# Patient Record
Sex: MALE | Race: WHITE | NOT HISPANIC OR LATINO | Employment: OTHER | URBAN - METROPOLITAN AREA
[De-identification: names, ages, dates, MRNs, and addresses within clinical notes are randomized per-mention and may not be internally consistent; named-entity substitution may affect disease eponyms.]

---

## 2020-07-10 DIAGNOSIS — R06.00 DYSPNEA, UNSPECIFIED TYPE: Primary | ICD-10-CM

## 2020-07-20 ENCOUNTER — HOSPITAL ENCOUNTER (OUTPATIENT)
Dept: CARDIOLOGY | Facility: HOSPITAL | Age: 75
Discharge: HOME | End: 2020-07-20
Attending: INTERNAL MEDICINE
Payer: OTHER GOVERNMENT

## 2020-07-20 ENCOUNTER — OFFICE VISIT (OUTPATIENT)
Dept: CARDIOLOGY | Facility: CLINIC | Age: 75
End: 2020-07-20
Payer: OTHER GOVERNMENT

## 2020-07-20 VITALS
SYSTOLIC BLOOD PRESSURE: 110 MMHG | DIASTOLIC BLOOD PRESSURE: 54 MMHG | HEART RATE: 97 BPM | OXYGEN SATURATION: 95 % | WEIGHT: 211 LBS

## 2020-07-20 DIAGNOSIS — J44.9 CHRONIC OBSTRUCTIVE PULMONARY DISEASE, UNSPECIFIED COPD TYPE: ICD-10-CM

## 2020-07-20 DIAGNOSIS — R06.09 OTHER FORM OF DYSPNEA: Primary | ICD-10-CM

## 2020-07-20 DIAGNOSIS — R07.9 CHEST PAIN, UNSPECIFIED TYPE: ICD-10-CM

## 2020-07-20 DIAGNOSIS — M06.9 RHEUMATOID ARTHRITIS, INVOLVING UNSPECIFIED SITE, UNSPECIFIED RHEUMATOID FACTOR PRESENCE: ICD-10-CM

## 2020-07-20 DIAGNOSIS — E11.69 TYPE 2 DIABETES MELLITUS WITH OTHER SPECIFIED COMPLICATION, UNSPECIFIED WHETHER LONG TERM INSULIN USE: ICD-10-CM

## 2020-07-20 DIAGNOSIS — I10 ESSENTIAL HYPERTENSION: ICD-10-CM

## 2020-07-20 DIAGNOSIS — E78.49 OTHER HYPERLIPIDEMIA: ICD-10-CM

## 2020-07-20 DIAGNOSIS — R06.00 DYSPNEA, UNSPECIFIED TYPE: ICD-10-CM

## 2020-07-20 PROBLEM — E11.9 TYPE 2 DIABETES MELLITUS: Status: ACTIVE | Noted: 2020-07-20

## 2020-07-20 PROCEDURE — 99999 PR PBB SHADOW E&M-EST. PATIENT-LVL IV: CPT | Mod: PBBFAC,,, | Performed by: INTERNAL MEDICINE

## 2020-07-20 PROCEDURE — 99205 PR OFFICE/OUTPT VISIT, NEW, LEVL V, 60-74 MIN: ICD-10-PCS | Mod: S$PBB,,, | Performed by: INTERNAL MEDICINE

## 2020-07-20 PROCEDURE — 93010 ELECTROCARDIOGRAM REPORT: CPT | Mod: ,,, | Performed by: INTERNAL MEDICINE

## 2020-07-20 PROCEDURE — 99214 OFFICE O/P EST MOD 30 MIN: CPT | Mod: PBBFAC,25 | Performed by: INTERNAL MEDICINE

## 2020-07-20 PROCEDURE — 99999 PR PBB SHADOW E&M-EST. PATIENT-LVL IV: ICD-10-PCS | Mod: PBBFAC,,, | Performed by: INTERNAL MEDICINE

## 2020-07-20 PROCEDURE — 99205 OFFICE O/P NEW HI 60 MIN: CPT | Mod: S$PBB,,, | Performed by: INTERNAL MEDICINE

## 2020-07-20 PROCEDURE — 93005 ELECTROCARDIOGRAM TRACING: CPT

## 2020-07-20 PROCEDURE — 93010 EKG 12-LEAD: ICD-10-PCS | Mod: ,,, | Performed by: INTERNAL MEDICINE

## 2020-07-20 RX ORDER — CETIRIZINE HYDROCHLORIDE 10 MG/1
10 TABLET ORAL DAILY
COMMUNITY

## 2020-07-20 RX ORDER — ALBUTEROL SULFATE 1.25 MG/3ML
1.25 SOLUTION RESPIRATORY (INHALATION) EVERY 6 HOURS PRN
COMMUNITY

## 2020-07-20 RX ORDER — AMLODIPINE BESYLATE 10 MG/1
10 TABLET ORAL DAILY
Status: ON HOLD | COMMUNITY
End: 2023-10-29 | Stop reason: HOSPADM

## 2020-07-20 RX ORDER — MELATONIN 10 MG
1 TABLET, SUBLINGUAL SUBLINGUAL DAILY
COMMUNITY
End: 2023-10-25

## 2020-07-20 RX ORDER — POLYVINYL ALCOHOL 14 MG/ML
1 SOLUTION/ DROPS OPHTHALMIC
COMMUNITY
End: 2023-10-25

## 2020-07-20 RX ORDER — HYDROXYCHLOROQUINE SULFATE 200 MG/1
TABLET, FILM COATED ORAL DAILY
COMMUNITY

## 2020-07-20 RX ORDER — GUAIFENESIN 600 MG/1
800 TABLET, EXTENDED RELEASE ORAL 2 TIMES DAILY
COMMUNITY
End: 2023-10-25

## 2020-07-20 RX ORDER — METHOTREXATE 2.5 MG/1
TABLET ORAL
COMMUNITY
End: 2023-10-25

## 2020-07-20 RX ORDER — CALCIUM CARBONATE 1250 MG/5ML
SUSPENSION ORAL ONCE
COMMUNITY
End: 2023-10-25 | Stop reason: ALTCHOICE

## 2020-07-20 RX ORDER — ALENDRONATE SODIUM 35 MG/1
35 TABLET ORAL
COMMUNITY
End: 2023-10-25

## 2020-07-20 RX ORDER — TAMSULOSIN HYDROCHLORIDE 0.4 MG/1
0.4 CAPSULE ORAL DAILY
COMMUNITY

## 2020-07-20 RX ORDER — LOSARTAN POTASSIUM 100 MG/1
100 TABLET ORAL DAILY
Status: ON HOLD | COMMUNITY
End: 2023-10-29 | Stop reason: HOSPADM

## 2020-07-20 RX ORDER — OMEPRAZOLE 20 MG/1
20 CAPSULE, DELAYED RELEASE ORAL DAILY
COMMUNITY

## 2020-07-20 RX ORDER — CARBOXYMETHYLCELLULOSE SODIUM 5 MG/ML
1 SOLUTION/ DROPS OPHTHALMIC 3 TIMES DAILY PRN
COMMUNITY
End: 2023-10-25

## 2020-07-20 RX ORDER — MONTELUKAST SODIUM 10 MG/1
10 TABLET ORAL NIGHTLY
COMMUNITY

## 2020-07-20 RX ORDER — BUDESONIDE AND FORMOTEROL FUMARATE DIHYDRATE 80; 4.5 UG/1; UG/1
2 AEROSOL RESPIRATORY (INHALATION)
COMMUNITY
End: 2023-10-25

## 2020-07-20 RX ORDER — ATORVASTATIN CALCIUM 80 MG/1
80 TABLET, FILM COATED ORAL DAILY
COMMUNITY
End: 2023-10-25

## 2020-07-20 RX ORDER — PREDNISONE 5 MG/1
5 TABLET ORAL DAILY
COMMUNITY

## 2020-07-20 RX ORDER — ALOGLIPTIN 25 MG/1
25 TABLET, FILM COATED ORAL DAILY
COMMUNITY
End: 2023-10-25

## 2020-07-20 RX ORDER — FOLIC ACID 1 MG/1
1 TABLET ORAL DAILY
COMMUNITY

## 2020-07-20 NOTE — LETTER
July 20, 2020      [facility]  [address] [facility] [place] [address]           [facility]  [address]  [place] [address]  Phone: [phone]  Fax: [phone]          Patient: Navdeep Sesay Jr.   MR Number: [id number]   YOB: 1945   Date of Visit: 7/20/2020       Dear [facility]:    Thank you for referring Navdeep Sesay to me for evaluation. Attached you will find relevant portions of my assessment and plan of care.    If you have questions, please do not hesitate to call me. I look forward to following Navdeep Sesay along with you.    Sincerely,    Dominic Carroll MD    Enclosure  CC:  No Recipients    If you would like to receive this communication electronically, please contact [email or web address] or [phone] to request more information on [employer] access.    For providers and/or their staff who would like to refer a patient to [facility], please contact us through our one-stop-shop provider referral line, [facility] , at [phone].    If you feel you have received this communication in error or would no longer like to receive these types of communications, please e-mail [email or web address]

## 2020-07-20 NOTE — PROGRESS NOTES
Subjective:   Patient ID:  Navdeep Sesay Jr. is a 74 y.o. male who presents for cardiac consult of Follow-up      HPI  The patient came in today for cardiac consult of Follow-up    20  Navdeep Sesay Jr. is a 74 y.o. male pt with HTN, HLD, COPD, anemia, GERD, PMR, RA, DM2 presents for CV evaluation of dyspnea.    [facility]m eval as well. He had recent labwork, A1c was normal. He has been chronically SOB but worse lately.   He had 6 min walk was neg. 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 He is weaning off prednisone.     ECG - NSR with PVCs, inc RBBB, inferior TWI    Patient feels no leg swelling, no PND,  no dizziness, no syncope, no CNS symptoms.    Patient has dec exercise tolerance.    Patient is compliant with medications.    FH - mother with CHF,  in 80s    History reviewed. No pertinent past medical history.    History reviewed. No pertinent surgical history.    Social History     Tobacco Use    Smoking status: Never Smoker    Smokeless tobacco: Never Used   Substance Use Topics    Alcohol use: Never     Frequency: Never    Drug use: Never       History reviewed. No pertinent family history.    Patient's Medications   New Prescriptions    No medications on file   Previous Medications    ADALIMUMAB (HUMIRA) 10 MG/0.2 ML SYKT    Inject 10 mg into the skin every 14 (fourteen) days.    ALBUTEROL (ACCUNEB) 1.25 MG/3 ML NEBU    Take 1.25 mg by nebulization every 6 (six) hours as needed. Rescue    ALENDRONATE (FOSAMAX) 35 MG TABLET    Take 35 mg by mouth every 7 days.    ALOGLIPTIN (NESINA) 25 MG TAB    Take by mouth.    AMLODIPINE (NORVASC) 10 MG TABLET    Take 10 mg by mouth once daily.    ATORVASTATIN (LIPITOR) 80 MG TABLET    Take 80 mg by mouth once daily.    BUDESONIDE-FORMOTEROL 80-4.5 MCG (SYMBICORT) 80-4.5 MCG/ACTUATION HFAA    Inhale 2 puffs into the lungs. Controller    CALCIUM CARBONATE 500 MG/5 ML (1,250 MG/5 ML)    Take  by mouth once.    CALCIUM CARBONATE-VITAMIN D3 500MG (1,250MG) -600 UNIT TAB    Take by mouth once.    CARBOXYMETHYLCELLULOSE (REFRESH PLUS) 0.5 % DPET    1 drop 3 (three) times daily as needed.    CETIRIZINE (ZYRTEC) 10 MG TABLET    Take 10 mg by mouth once daily.    FOLIC ACID (FOLVITE) 1 MG TABLET    Take 1 mg by mouth once daily.    GUAIFENESIN (MUCINEX) 600 MG 12 HR TABLET    Take 800 mg by mouth 2 (two) times daily.    HYDROXYCHLOROQUINE (PLAQUENIL) 200 MG TABLET    Take by mouth once daily.    LOSARTAN (COZAAR) 100 MG TABLET    Take 100 mg by mouth once daily.    METHOTREXATE 2.5 MG TAB    Take by mouth every 7 days.    MONTELUKAST (SINGULAIR) 10 MG TABLET    Take 10 mg by mouth every evening.    OMEPRAZOLE (PRILOSEC) 20 MG CAPSULE    Take 20 mg by mouth once daily.    POLYVINYL ALCOHOL, ARTIFICIAL TEARS, (LIQUIFILM TEARS) 1.4 % OPHTHALMIC SOLUTION    1 drop as needed.    PREDNISONE (DELTASONE) 5 MG TABLET    Take 7 mg by mouth once daily.    TAMSULOSIN (FLOMAX) 0.4 MG CAP    Take 0.4 mg by mouth once daily.   Modified Medications    No medications on file   Discontinued Medications    No medications on file       Review of Systems   Constitutional: Positive for malaise/fatigue.   HENT: Negative.    Eyes: Negative.    Respiratory: Positive for shortness of breath.    Cardiovascular: Positive for chest pain.   Gastrointestinal: Negative.    Genitourinary: Negative.    Musculoskeletal: Negative.    Skin: Negative.    Neurological: Negative.    Endo/Heme/Allergies: Negative.    Psychiatric/Behavioral: Negative.    All 12 systems otherwise negative.      Wt Readings from Last 3 Encounters:   07/20/20 95.7 kg (211 lb)     Temp Readings from Last 3 Encounters:   No data found for Temp     BP Readings from Last 3 Encounters:   07/20/20 (!) 110/54     Pulse Readings from Last 3 Encounters:   07/20/20 97       BP (!) 110/54 (BP Location: Left arm, Patient Position: Sitting, BP Method: Medium (Manual))   Pulse 97    Wt 95.7 kg (211 lb)   SpO2 95%     Objective:   Physical Exam   Constitutional: He is oriented to person, place, and time. He appears well-developed and well-nourished. No distress.   HENT:   Head: Normocephalic and atraumatic.   Nose: Nose normal.   Mouth/Throat: Oropharynx is clear and moist.   Eyes: Conjunctivae and EOM are normal. No scleral icterus.   Neck: Normal range of motion. Neck supple. No JVD present. No thyromegaly present.   Cardiovascular: Normal rate, regular rhythm, S1 normal and S2 normal. Exam reveals no gallop, no S3, no S4 and no friction rub.   Murmur heard.  Pulmonary/Chest: Effort normal and breath sounds normal. No stridor. No respiratory distress. He has no wheezes. He has no rales. He exhibits no tenderness.   Abdominal: Soft. Bowel sounds are normal. He exhibits no distension and no mass. There is no abdominal tenderness. There is no rebound.   Genitourinary:    Genitourinary Comments: Deferred     Musculoskeletal: Normal range of motion.         General: No tenderness, deformity or edema.   Lymphadenopathy:     He has no cervical adenopathy.   Neurological: He is alert and oriented to person, place, and time. He exhibits normal muscle tone. Coordination normal.   Skin: Skin is warm and dry. No rash noted. He is not diaphoretic. No erythema. No pallor.   Psychiatric: He has a normal mood and affect. His behavior is normal. Judgment and thought content normal.   Nursing note and vitals reviewed.      No results found for: NA, K, CL, CO2, BUN, CREATININE, GLU, HGBA1C, MG, AST, ALT, ALBUMIN, PROT, BILITOT, WBC, HGB, HCT, MCV, PLT, INR, TSH, CHOL, HDL, LDLCALC, TRIG, BNP  Assessment:      1. Other form of dyspnea    2. Essential hypertension    3. Other hyperlipidemia    4. Rheumatoid arthritis, involving unspecified site, unspecified rheumatoid factor presence    5. Type 2 diabetes mellitus with other specified complication, unspecified whether long term insulin use    6. Chronic  obstructive pulmonary disease, unspecified COPD type    7. Chest pain, unspecified type        Plan:   1. Dyspnea with CP  - order pharm nuclear stress test, pt cannot walk on treadmill due to dyspnea  - 2D ECHO ordered  - labs today  - f/u with pulm eval    2. HTN  - cont meds and titrate    3. HLD  - cont statin    4. RA/DM2  - cont meds per PCP    5. COPD  - cont tx per PCP/pulm    Thank you for allowing me to participate in this patient's care. Please do not hesitate to contact me with any questions or concerns. Consult note has been forwarded to the referral physician.

## 2020-07-21 ENCOUNTER — TELEPHONE (OUTPATIENT)
Dept: CARDIOLOGY | Facility: CLINIC | Age: 75
End: 2020-07-21

## 2020-07-21 NOTE — TELEPHONE ENCOUNTER
Spoke with patient gave normal results for BNP and scheduled patients nuclear stress test. Patient states understanding. ----- Message from Dominic Carroll MD sent at 7/21/2020  9:37 AM CDT -----  Please call the patient regarding his result. Normal BNP and labwork, no extra fluid noted, continue current medications and follow up with pulmonary and testing ordered by me.

## 2020-08-20 ENCOUNTER — HOSPITAL ENCOUNTER (OUTPATIENT)
Dept: CARDIOLOGY | Facility: HOSPITAL | Age: 75
Discharge: HOME | End: 2020-08-20
Attending: INTERNAL MEDICINE
Payer: OTHER GOVERNMENT

## 2020-08-20 ENCOUNTER — HOSPITAL ENCOUNTER (OUTPATIENT)
Dept: RADIOLOGY | Facility: HOSPITAL | Age: 75
Discharge: HOME | End: 2020-08-20
Attending: INTERNAL MEDICINE
Payer: OTHER GOVERNMENT

## 2020-08-20 VITALS — WEIGHT: 211 LBS | SYSTOLIC BLOOD PRESSURE: 110 MMHG | DIASTOLIC BLOOD PRESSURE: 54 MMHG

## 2020-08-20 DIAGNOSIS — R07.9 CHEST PAIN, UNSPECIFIED TYPE: ICD-10-CM

## 2020-08-20 DIAGNOSIS — I10 ESSENTIAL HYPERTENSION: ICD-10-CM

## 2020-08-20 DIAGNOSIS — J44.9 CHRONIC OBSTRUCTIVE PULMONARY DISEASE, UNSPECIFIED COPD TYPE: ICD-10-CM

## 2020-08-20 DIAGNOSIS — R06.09 OTHER FORM OF DYSPNEA: ICD-10-CM

## 2020-08-20 DIAGNOSIS — M06.9 RHEUMATOID ARTHRITIS, INVOLVING UNSPECIFIED SITE, UNSPECIFIED RHEUMATOID FACTOR PRESENCE: ICD-10-CM

## 2020-08-20 DIAGNOSIS — E78.49 OTHER HYPERLIPIDEMIA: ICD-10-CM

## 2020-08-20 DIAGNOSIS — E11.69 TYPE 2 DIABETES MELLITUS WITH OTHER SPECIFIED COMPLICATION, UNSPECIFIED WHETHER LONG TERM INSULIN USE: ICD-10-CM

## 2020-08-20 LAB
CV STRESS BASE HR: 82 BPM
DIASTOLIC BLOOD PRESSURE: 74 MMHG
NUC REST EJECTION FRACTION: 80
NUC STRESS EJECTION FRACTION: 73 %
OHS CV CPX 85 PERCENT MAX PREDICTED HEART RATE MALE: 122
OHS CV CPX ESTIMATED METS: 1
OHS CV CPX MAX PREDICTED HEART RATE: 144
OHS CV CPX PATIENT IS FEMALE: 0
OHS CV CPX PATIENT IS MALE: 1
OHS CV CPX PEAK DIASTOLIC BLOOD PRESSURE: 68 MMHG
OHS CV CPX PEAK HEAR RATE: 98 BPM
OHS CV CPX PEAK RATE PRESSURE PRODUCT: NORMAL
OHS CV CPX PEAK SYSTOLIC BLOOD PRESSURE: 190 MMHG
OHS CV CPX PERCENT MAX PREDICTED HEART RATE ACHIEVED: 68
OHS CV CPX RATE PRESSURE PRODUCT PRESENTING: NORMAL
STRESS ECHO POST EXERCISE DUR MIN: 1 MINUTES
STRESS ECHO POST EXERCISE DUR SEC: 17 SECONDS
SYSTOLIC BLOOD PRESSURE: 167 MMHG

## 2020-08-20 PROCEDURE — 93016 CV STRESS TEST SUPVJ ONLY: CPT | Mod: ,,, | Performed by: INTERNAL MEDICINE

## 2020-08-20 PROCEDURE — A9502 TC99M TETROFOSMIN: HCPCS

## 2020-08-20 PROCEDURE — 78452 STRESS TEST WITH MYOCARDIAL PERFUSION (CUPID ONLY): ICD-10-PCS | Mod: 26,,, | Performed by: INTERNAL MEDICINE

## 2020-08-20 PROCEDURE — 93018 STRESS TEST WITH MYOCARDIAL PERFUSION (CUPID ONLY): ICD-10-PCS | Mod: ,,, | Performed by: INTERNAL MEDICINE

## 2020-08-20 PROCEDURE — 78452 HT MUSCLE IMAGE SPECT MULT: CPT | Mod: 26,,, | Performed by: INTERNAL MEDICINE

## 2020-08-20 PROCEDURE — 93018 CV STRESS TEST I&R ONLY: CPT | Mod: ,,, | Performed by: INTERNAL MEDICINE

## 2020-08-20 PROCEDURE — 93306 TTE W/DOPPLER COMPLETE: CPT

## 2020-08-20 PROCEDURE — 93306 ECHO (CUPID ONLY): ICD-10-PCS | Mod: 26,,, | Performed by: INTERNAL MEDICINE

## 2020-08-20 PROCEDURE — 93306 TTE W/DOPPLER COMPLETE: CPT | Mod: 26,,, | Performed by: INTERNAL MEDICINE

## 2020-08-20 PROCEDURE — 93016 STRESS TEST WITH MYOCARDIAL PERFUSION (CUPID ONLY): ICD-10-PCS | Mod: ,,, | Performed by: INTERNAL MEDICINE

## 2020-08-20 PROCEDURE — 93017 CV STRESS TEST TRACING ONLY: CPT

## 2020-08-20 RX ORDER — REGADENOSON 0.08 MG/ML
0.4 INJECTION, SOLUTION INTRAVENOUS ONCE
Status: COMPLETED | OUTPATIENT
Start: 2020-08-20 | End: 2020-08-20

## 2020-08-20 RX ADMIN — REGADENOSON 0.4 MG: 0.08 INJECTION, SOLUTION INTRAVENOUS at 01:08

## 2020-08-21 LAB
AORTIC ROOT ANNULUS: 3.49 CM
AV INDEX (PROSTH): 0.6
AV MEAN GRADIENT: 12 MMHG
AV PEAK GRADIENT: 22 MMHG
AV VALVE AREA: 1.91 CM2
AV VELOCITY RATIO: 0.55
CV ECHO LV RWT: 0.65 CM
DOP CALC AO PEAK VEL: 2.34 M/S
DOP CALC AO VTI: 51.33 CM
DOP CALC LVOT AREA: 3.2 CM2
DOP CALC LVOT DIAMETER: 2.01 CM
DOP CALC LVOT PEAK VEL: 1.29 M/S
DOP CALC LVOT STROKE VOLUME: 98.06 CM3
DOP CALC RVOT PEAK VEL: 0.91 M/S
DOP CALC RVOT VTI: 16 CM
DOP CALCLVOT PEAK VEL VTI: 30.92 CM
E WAVE DECELERATION TIME: 366 MSEC
E/A RATIO: 0.95
E/E' RATIO: 16.57 M/S
ECHO LV POSTERIOR WALL: 1.14 CM (ref 0.6–1.1)
FRACTIONAL SHORTENING: 29 % (ref 28–44)
INTERVENTRICULAR SEPTUM: 1.54 CM (ref 0.6–1.1)
IVRT: 85.63 MSEC
LA MAJOR: 4.89 CM
LA WIDTH: 3.23 CM
LEFT ATRIUM SIZE: 3.33 CM
LEFT INTERNAL DIMENSION IN SYSTOLE: 2.5 CM (ref 2.1–4)
LEFT VENTRICLE DIASTOLIC VOLUME: 50.98 ML
LEFT VENTRICLE SYSTOLIC VOLUME: 22.38 ML
LEFT VENTRICULAR INTERNAL DIMENSION IN DIASTOLE: 3.5 CM (ref 3.5–6)
LEFT VENTRICULAR MASS: 161.3 G
LV LATERAL E/E' RATIO: 14.5 M/S
LV SEPTAL E/E' RATIO: 19.33 M/S
MV PEAK A VEL: 1.22 M/S
MV PEAK E VEL: 1.16 M/S
PISA TR MAX VEL: 3.14 M/S
PV MEAN GRADIENT: 1.83 MMHG
PV PEAK VELOCITY: 1.74 CM/S
RA MAJOR: 4.71 CM
RA PRESSURE: 3 MMHG
RA WIDTH: 3.54 CM
RIGHT VENTRICLE FREE WALL THICKNESS: 0.6
RIGHT VENTRICULAR END-DIASTOLIC DIMENSION: 2.42 CM
SINUS: 3.04 CM
STJ: 2.79 CM
TDI LATERAL: 0.08 M/S
TDI SEPTAL: 0.06 M/S
TDI: 0.07 M/S
TR MAX PG: 39 MMHG
TV REST PULMONARY ARTERY PRESSURE: 42 MMHG

## 2020-09-10 ENCOUNTER — TELEPHONE (OUTPATIENT)
Dept: PULMONOLOGY | Facility: CLINIC | Age: 75
End: 2020-09-10

## 2021-06-29 DIAGNOSIS — E78.49 OTHER HYPERLIPIDEMIA: ICD-10-CM

## 2021-06-29 DIAGNOSIS — I10 ESSENTIAL HYPERTENSION: ICD-10-CM

## 2021-06-29 DIAGNOSIS — J44.9 CHRONIC OBSTRUCTIVE PULMONARY DISEASE, UNSPECIFIED COPD TYPE: Primary | ICD-10-CM

## 2021-06-30 ENCOUNTER — TELEPHONE (OUTPATIENT)
Dept: CARDIOLOGY | Facility: CLINIC | Age: 76
End: 2021-06-30

## 2021-06-30 ENCOUNTER — OFFICE VISIT (OUTPATIENT)
Dept: CARDIOLOGY | Facility: CLINIC | Age: 76
End: 2021-06-30
Payer: OTHER GOVERNMENT

## 2021-06-30 ENCOUNTER — HOSPITAL ENCOUNTER (OUTPATIENT)
Dept: CARDIOLOGY | Facility: HOSPITAL | Age: 76
Discharge: HOME | End: 2021-06-30
Attending: INTERNAL MEDICINE
Payer: OTHER GOVERNMENT

## 2021-06-30 VITALS
DIASTOLIC BLOOD PRESSURE: 52 MMHG | WEIGHT: 204.13 LBS | HEIGHT: 71 IN | HEART RATE: 78 BPM | BODY MASS INDEX: 28.58 KG/M2 | SYSTOLIC BLOOD PRESSURE: 110 MMHG | RESPIRATION RATE: 16 BRPM | OXYGEN SATURATION: 93 %

## 2021-06-30 DIAGNOSIS — R06.09 OTHER FORM OF DYSPNEA: ICD-10-CM

## 2021-06-30 DIAGNOSIS — R94.39 ABNORMAL NUCLEAR STRESS TEST: Primary | ICD-10-CM

## 2021-06-30 DIAGNOSIS — I27.20 PULMONARY HYPERTENSION: ICD-10-CM

## 2021-06-30 DIAGNOSIS — I10 ESSENTIAL HYPERTENSION: ICD-10-CM

## 2021-06-30 DIAGNOSIS — E78.49 OTHER HYPERLIPIDEMIA: ICD-10-CM

## 2021-06-30 DIAGNOSIS — J44.9 CHRONIC OBSTRUCTIVE PULMONARY DISEASE, UNSPECIFIED COPD TYPE: ICD-10-CM

## 2021-06-30 DIAGNOSIS — I50.32 CHRONIC HEART FAILURE WITH PRESERVED EJECTION FRACTION: ICD-10-CM

## 2021-06-30 DIAGNOSIS — E11.69 TYPE 2 DIABETES MELLITUS WITH OTHER SPECIFIED COMPLICATION, UNSPECIFIED WHETHER LONG TERM INSULIN USE: ICD-10-CM

## 2021-06-30 DIAGNOSIS — R07.9 CHEST PAIN, UNSPECIFIED TYPE: ICD-10-CM

## 2021-06-30 PROCEDURE — 99215 OFFICE O/P EST HI 40 MIN: CPT | Mod: PBBFAC,25 | Performed by: INTERNAL MEDICINE

## 2021-06-30 PROCEDURE — 93010 EKG 12-LEAD: ICD-10-PCS | Mod: ,,, | Performed by: INTERNAL MEDICINE

## 2021-06-30 PROCEDURE — 99999 PR PBB SHADOW E&M-EST. PATIENT-LVL V: CPT | Mod: PBBFAC,,, | Performed by: INTERNAL MEDICINE

## 2021-06-30 PROCEDURE — 99215 OFFICE O/P EST HI 40 MIN: CPT | Mod: S$PBB,,, | Performed by: INTERNAL MEDICINE

## 2021-06-30 PROCEDURE — 93005 ELECTROCARDIOGRAM TRACING: CPT

## 2021-06-30 PROCEDURE — 99999 PR PBB SHADOW E&M-EST. PATIENT-LVL V: ICD-10-PCS | Mod: PBBFAC,,, | Performed by: INTERNAL MEDICINE

## 2021-06-30 PROCEDURE — 93010 ELECTROCARDIOGRAM REPORT: CPT | Mod: ,,, | Performed by: INTERNAL MEDICINE

## 2021-06-30 PROCEDURE — 99215 PR OFFICE/OUTPT VISIT, EST, LEVL V, 40-54 MIN: ICD-10-PCS | Mod: S$PBB,,, | Performed by: INTERNAL MEDICINE

## 2021-06-30 RX ORDER — ASPIRIN 81 MG/1
81 TABLET ORAL DAILY
Qty: 30 TABLET | Refills: 0
Start: 2021-06-30 | End: 2023-10-25

## 2021-07-07 ENCOUNTER — TELEPHONE (OUTPATIENT)
Dept: CARDIOLOGY | Facility: CLINIC | Age: 76
End: 2021-07-07

## 2021-07-12 ENCOUNTER — TELEPHONE (OUTPATIENT)
Dept: CARDIOLOGY | Facility: CLINIC | Age: 76
End: 2021-07-12

## 2021-07-16 ENCOUNTER — TELEPHONE (OUTPATIENT)
Dept: RADIOLOGY | Facility: HOSPITAL | Age: 76
End: 2021-07-16

## 2021-12-15 ENCOUNTER — TELEPHONE (OUTPATIENT)
Dept: GASTROENTEROLOGY | Facility: CLINIC | Age: 76
End: 2021-12-15

## 2021-12-15 ENCOUNTER — LAB VISIT (OUTPATIENT)
Dept: LAB | Facility: HOSPITAL | Age: 76
End: 2021-12-15
Attending: PHYSICIAN ASSISTANT
Payer: OTHER GOVERNMENT

## 2021-12-15 ENCOUNTER — DOCUMENTATION ONLY (OUTPATIENT)
Dept: GASTROENTEROLOGY | Facility: CLINIC | Age: 76
End: 2021-12-15

## 2021-12-15 ENCOUNTER — OFFICE VISIT (OUTPATIENT)
Dept: GASTROENTEROLOGY | Facility: CLINIC | Age: 76
End: 2021-12-15
Payer: OTHER GOVERNMENT

## 2021-12-15 VITALS
WEIGHT: 188.06 LBS | HEIGHT: 71 IN | SYSTOLIC BLOOD PRESSURE: 124 MMHG | DIASTOLIC BLOOD PRESSURE: 66 MMHG | BODY MASS INDEX: 26.33 KG/M2 | HEART RATE: 92 BPM

## 2021-12-15 DIAGNOSIS — D64.9 ANEMIA, UNSPECIFIED TYPE: Primary | ICD-10-CM

## 2021-12-15 DIAGNOSIS — D64.9 ANEMIA, UNSPECIFIED TYPE: ICD-10-CM

## 2021-12-15 LAB
BASOPHILS # BLD AUTO: 0 K/UL (ref 0–0.2)
BASOPHILS NFR BLD: 0 % (ref 0–1.9)
DIFFERENTIAL METHOD: ABNORMAL
EOSINOPHIL # BLD AUTO: 0 K/UL (ref 0–0.5)
EOSINOPHIL NFR BLD: 0 % (ref 0–8)
ERYTHROCYTE [DISTWIDTH] IN BLOOD BY AUTOMATED COUNT: 18.5 % (ref 11.5–14.5)
HCT VFR BLD AUTO: 27.3 % (ref 40–54)
HGB BLD-MCNC: 7.5 G/DL (ref 14–18)
IMM GRANULOCYTES # BLD AUTO: 0.04 K/UL (ref 0–0.04)
IMM GRANULOCYTES NFR BLD AUTO: 0.4 % (ref 0–0.5)
IRON SERPL-MCNC: 178 UG/DL (ref 45–160)
LYMPHOCYTES # BLD AUTO: 0.2 K/UL (ref 1–4.8)
LYMPHOCYTES NFR BLD: 2.4 % (ref 18–48)
MCH RBC QN AUTO: 27.7 PG (ref 27–31)
MCHC RBC AUTO-ENTMCNC: 27.5 G/DL (ref 32–36)
MCV RBC AUTO: 101 FL (ref 82–98)
MONOCYTES # BLD AUTO: 0.3 K/UL (ref 0.3–1)
MONOCYTES NFR BLD: 3.7 % (ref 4–15)
NEUTROPHILS # BLD AUTO: 8.5 K/UL (ref 1.8–7.7)
NEUTROPHILS NFR BLD: 93.5 % (ref 38–73)
NRBC BLD-RTO: 0 /100 WBC
PLATELET # BLD AUTO: 252 K/UL (ref 150–450)
PMV BLD AUTO: 9.5 FL (ref 9.2–12.9)
RBC # BLD AUTO: 2.71 M/UL (ref 4.6–6.2)
SATURATED IRON: 53 % (ref 20–50)
TOTAL IRON BINDING CAPACITY: 333 UG/DL (ref 250–450)
TRANSFERRIN SERPL-MCNC: 225 MG/DL (ref 200–375)
WBC # BLD AUTO: 9.14 K/UL (ref 3.9–12.7)

## 2021-12-15 PROCEDURE — 99999 PR PBB SHADOW E&M-EST. PATIENT-LVL IV: CPT | Mod: PBBFAC,,, | Performed by: PHYSICIAN ASSISTANT

## 2021-12-15 PROCEDURE — 99214 OFFICE O/P EST MOD 30 MIN: CPT | Mod: PBBFAC | Performed by: PHYSICIAN ASSISTANT

## 2021-12-15 PROCEDURE — 84466 ASSAY OF TRANSFERRIN: CPT | Performed by: PHYSICIAN ASSISTANT

## 2021-12-15 PROCEDURE — 85025 COMPLETE CBC W/AUTO DIFF WBC: CPT | Performed by: PHYSICIAN ASSISTANT

## 2021-12-15 PROCEDURE — 99999 PR PBB SHADOW E&M-EST. PATIENT-LVL IV: ICD-10-PCS | Mod: PBBFAC,,, | Performed by: PHYSICIAN ASSISTANT

## 2021-12-15 PROCEDURE — 99204 OFFICE O/P NEW MOD 45 MIN: CPT | Mod: S$PBB,,, | Performed by: PHYSICIAN ASSISTANT

## 2021-12-15 PROCEDURE — 36415 COLL VENOUS BLD VENIPUNCTURE: CPT | Performed by: PHYSICIAN ASSISTANT

## 2021-12-15 PROCEDURE — 99204 PR OFFICE/OUTPT VISIT, NEW, LEVL IV, 45-59 MIN: ICD-10-PCS | Mod: S$PBB,,, | Performed by: PHYSICIAN ASSISTANT

## 2021-12-21 ENCOUNTER — TELEPHONE (OUTPATIENT)
Dept: CARDIOLOGY | Facility: CLINIC | Age: 76
End: 2021-12-21
Payer: OTHER GOVERNMENT

## 2021-12-21 ENCOUNTER — TELEPHONE (OUTPATIENT)
Dept: GASTROENTEROLOGY | Facility: CLINIC | Age: 76
End: 2021-12-21
Payer: OTHER GOVERNMENT

## 2021-12-23 ENCOUNTER — TELEPHONE (OUTPATIENT)
Dept: GASTROENTEROLOGY | Facility: CLINIC | Age: 76
End: 2021-12-23
Payer: OTHER GOVERNMENT

## 2021-12-29 DIAGNOSIS — I50.32 CHRONIC HEART FAILURE WITH PRESERVED EJECTION FRACTION: Primary | ICD-10-CM

## 2021-12-29 DIAGNOSIS — I10 ESSENTIAL HYPERTENSION: ICD-10-CM

## 2021-12-29 DIAGNOSIS — I27.20 PULMONARY HYPERTENSION: ICD-10-CM

## 2022-01-04 ENCOUNTER — HOSPITAL ENCOUNTER (OUTPATIENT)
Dept: CARDIOLOGY | Facility: HOSPITAL | Age: 77
Discharge: HOME | End: 2022-01-04
Attending: INTERNAL MEDICINE
Payer: OTHER GOVERNMENT

## 2022-01-04 ENCOUNTER — OFFICE VISIT (OUTPATIENT)
Dept: CARDIOLOGY | Facility: CLINIC | Age: 77
End: 2022-01-04
Payer: OTHER GOVERNMENT

## 2022-01-04 VITALS
OXYGEN SATURATION: 97 % | BODY MASS INDEX: 27.32 KG/M2 | HEART RATE: 96 BPM | DIASTOLIC BLOOD PRESSURE: 74 MMHG | HEIGHT: 71 IN | SYSTOLIC BLOOD PRESSURE: 126 MMHG | RESPIRATION RATE: 16 BRPM | WEIGHT: 195.13 LBS

## 2022-01-04 DIAGNOSIS — E11.69 TYPE 2 DIABETES MELLITUS WITH OTHER SPECIFIED COMPLICATION, UNSPECIFIED WHETHER LONG TERM INSULIN USE: ICD-10-CM

## 2022-01-04 DIAGNOSIS — I10 ESSENTIAL HYPERTENSION: ICD-10-CM

## 2022-01-04 DIAGNOSIS — I27.20 PULMONARY HYPERTENSION: ICD-10-CM

## 2022-01-04 DIAGNOSIS — R94.39 ABNORMAL NUCLEAR STRESS TEST: ICD-10-CM

## 2022-01-04 DIAGNOSIS — R94.39 ABNORMAL STRESS TEST: ICD-10-CM

## 2022-01-04 DIAGNOSIS — J44.9 CHRONIC OBSTRUCTIVE PULMONARY DISEASE, UNSPECIFIED COPD TYPE: ICD-10-CM

## 2022-01-04 DIAGNOSIS — R07.9 CHEST PAIN, UNSPECIFIED TYPE: ICD-10-CM

## 2022-01-04 DIAGNOSIS — R06.00 DYSPNEA, UNSPECIFIED TYPE: ICD-10-CM

## 2022-01-04 DIAGNOSIS — I50.32 CHRONIC HEART FAILURE WITH PRESERVED EJECTION FRACTION: ICD-10-CM

## 2022-01-04 DIAGNOSIS — R06.09 OTHER FORM OF DYSPNEA: ICD-10-CM

## 2022-01-04 DIAGNOSIS — I50.32 CHRONIC HEART FAILURE WITH PRESERVED EJECTION FRACTION: Primary | ICD-10-CM

## 2022-01-04 DIAGNOSIS — E78.49 OTHER HYPERLIPIDEMIA: ICD-10-CM

## 2022-01-04 PROCEDURE — 93005 ELECTROCARDIOGRAM TRACING: CPT

## 2022-01-04 PROCEDURE — 99214 PR OFFICE/OUTPT VISIT, EST, LEVL IV, 30-39 MIN: ICD-10-PCS | Mod: S$PBB,,, | Performed by: INTERNAL MEDICINE

## 2022-01-04 PROCEDURE — 99214 OFFICE O/P EST MOD 30 MIN: CPT | Mod: S$PBB,,, | Performed by: INTERNAL MEDICINE

## 2022-01-04 PROCEDURE — 93010 EKG 12-LEAD: ICD-10-PCS | Mod: ,,, | Performed by: INTERNAL MEDICINE

## 2022-01-04 PROCEDURE — 99215 OFFICE O/P EST HI 40 MIN: CPT | Mod: PBBFAC | Performed by: INTERNAL MEDICINE

## 2022-01-04 PROCEDURE — 99999 PR PBB SHADOW E&M-EST. PATIENT-LVL V: CPT | Mod: PBBFAC,,, | Performed by: INTERNAL MEDICINE

## 2022-01-04 PROCEDURE — 93010 ELECTROCARDIOGRAM REPORT: CPT | Mod: ,,, | Performed by: INTERNAL MEDICINE

## 2022-01-04 PROCEDURE — 99999 PR PBB SHADOW E&M-EST. PATIENT-LVL V: ICD-10-PCS | Mod: PBBFAC,,, | Performed by: INTERNAL MEDICINE

## 2022-01-04 NOTE — PROGRESS NOTES
Subjective:   Patient ID:  Navdeep Sesay Jr. is a 76 y.o. male who presents for cardiac consult of No chief complaint on file.      HPI  The patient came in today for cardiac consult of No chief complaint on file.      Navdeep Sesay Jr. is a 76 y.o. male pt with HFpEF, pulm HTN, HTN, HLD, COPD, anemia, GERD, PMR, RA, DM2 presents for follow up CV eval.     20  [facility] will [facility]m eval as well. He had recent labwork, A1c was normal. He has been chronically SOB but worse lately.   He had 6 min walk was neg. 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 He is weaning off prednisone.   ECG - NSR with PVCs, inc RBBB, inferior TWI    21  He has not followed up since prior visit due to being told stress test normal. His nuclear stress with abnormal inferior ischemia. ECHO with grade 2 DD, pulm HTN.   ECG - NSR, inc RBBB, nonspec T wave ab    21  BP and HR stable today. Has not gotten R/LHC yet. 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 He is also anemic had received PRBCs, he is also getting B12 shots.   ECG - NSR, PACs, Inc RBB, interior TWI     Patient feels no leg swelling, no PND,  no dizziness, no syncope, no CNS symptoms.    Patient has dec exercise tolerance.    Patient is compliant with medications.    FH - mother with CHF,  in 80s    Results for orders placed during the hospital encounter of 20    Echo Color Flow Doppler? Yes    Interpretation Summary  · Concentric left ventricular hypertrophy.  · Normal left ventricular systolic function. The estimated ejection fraction is 55%.  · Grade II (moderate) left ventricular diastolic dysfunction consistent with pseudonormalization.  · Normal right ventricular systolic function.  · There is right ventricular hypertrophy.  · Normal central venous pressure (3 mmHg).  · The estimated PA systolic pressure is 42 mmHg.  · Pulmonary  hypertension present.    Results for orders placed during the hospital encounter of 08/20/20    Nuclear Stress - Cardiology Interpreted    Interpretation Summary    The study shows abnormal myocardial perfusion.    Abnormal myocardial perfusion study.    Perfusion Defect There is a small sized, reversible defect that is consistent with ischemia in the basal to mid inferior wall(s).    Gated perfusion images showed an ejection fraction of 80% at rest and 73% post stress.    The EKG portion of this study is negative for ischemia.    Arrhythmias during stress: occasional PVCs.      History reviewed. No pertinent past medical history.    History reviewed. No pertinent surgical history.    Social History     Tobacco Use    Smoking status: Never Smoker    Smokeless tobacco: Never Used   Substance Use Topics    Alcohol use: Never    Drug use: Never       History reviewed. No pertinent family history.    Patient's Medications   New Prescriptions    No medications on file   Previous Medications    ADALIMUMAB (HUMIRA) 10 MG/0.2 ML SYKT    Inject 10 mg into the skin every 14 (fourteen) days.    ALBUTEROL (ACCUNEB) 1.25 MG/3 ML NEBU    Take 1.25 mg by nebulization every 6 (six) hours as needed. Rescue    ALENDRONATE (FOSAMAX) 35 MG TABLET    Take 35 mg by mouth every 7 days.    ALOGLIPTIN (NESINA) 25 MG TAB    Take by mouth.    AMLODIPINE (NORVASC) 10 MG TABLET    Take 10 mg by mouth once daily.    ASPIRIN (ECOTRIN) 81 MG EC TABLET    Take 1 tablet (81 mg total) by mouth once daily.    ATORVASTATIN (LIPITOR) 80 MG TABLET    Take 80 mg by mouth once daily.    BUDESONIDE-FORMOTEROL 80-4.5 MCG (SYMBICORT) 80-4.5 MCG/ACTUATION HFAA    Inhale 2 puffs into the lungs. Controller    CALCIUM CARBONATE 500 MG/5 ML (1,250 MG/5 ML)    Take by mouth once.    CALCIUM CARBONATE-VITAMIN D3 500MG (1,250MG) -600 UNIT TAB    Take by mouth once.    CARBOXYMETHYLCELLULOSE (REFRESH PLUS) 0.5 % DPET    1 drop 3 (three) times daily as  "needed.    CETIRIZINE (ZYRTEC) 10 MG TABLET    Take 10 mg by mouth once daily.    FOLIC ACID (FOLVITE) 1 MG TABLET    Take 1 mg by mouth once daily.    GUAIFENESIN (MUCINEX) 600 MG 12 HR TABLET    Take 800 mg by mouth 2 (two) times daily.    HYDROXYCHLOROQUINE (PLAQUENIL) 200 MG TABLET    Take by mouth once daily.    LOSARTAN (COZAAR) 100 MG TABLET    Take 100 mg by mouth once daily.    METHOTREXATE 2.5 MG TAB    Take by mouth every 7 days.    MONTELUKAST (SINGULAIR) 10 MG TABLET    Take 10 mg by mouth every evening.    OMEPRAZOLE (PRILOSEC) 20 MG CAPSULE    Take 20 mg by mouth once daily.    POLYVINYL ALCOHOL, ARTIFICIAL TEARS, (LIQUIFILM TEARS) 1.4 % OPHTHALMIC SOLUTION    1 drop as needed.    PREDNISONE (DELTASONE) 5 MG TABLET    Take 10 mg by mouth once daily.     TAMSULOSIN (FLOMAX) 0.4 MG CAP    Take 0.4 mg by mouth once daily.   Modified Medications    No medications on file   Discontinued Medications    No medications on file       Review of Systems   Constitutional: Positive for malaise/fatigue.   HENT: Negative.    Eyes: Negative.    Respiratory: Positive for shortness of breath.    Cardiovascular: Positive for chest pain.   Gastrointestinal: Negative.    Genitourinary: Negative.    Musculoskeletal: Negative.    Skin: Negative.    Neurological: Negative.    Endo/Heme/Allergies: Negative.    Psychiatric/Behavioral: Negative.    All 12 systems otherwise negative.      Wt Readings from Last 3 Encounters:   01/04/22 88.5 kg (195 lb 1.7 oz)   12/15/21 85.3 kg (188 lb 0.8 oz)   06/30/21 92.6 kg (204 lb 2.3 oz)     Temp Readings from Last 3 Encounters:   No data found for Temp     BP Readings from Last 3 Encounters:   01/04/22 126/74   12/15/21 124/66   06/30/21 (!) 110/52     Pulse Readings from Last 3 Encounters:   01/04/22 96   12/15/21 92   06/30/21 78       /74 (BP Location: Left arm, Patient Position: Sitting, BP Method: Medium (Manual))   Pulse 96   Resp 16   Ht 5' 11" (1.803 m)   Wt 88.5 kg (195 " lb 1.7 oz)   SpO2 97%   BMI 27.21 kg/m²     Objective:   Physical Exam  Vitals and nursing note reviewed.   Constitutional:       General: He is not in acute distress.     Appearance: He is well-developed. He is not diaphoretic.   HENT:      Head: Normocephalic and atraumatic.      Nose: Nose normal.   Eyes:      General: No scleral icterus.     Conjunctiva/sclera: Conjunctivae normal.   Neck:      Thyroid: No thyromegaly.      Vascular: No JVD.   Cardiovascular:      Rate and Rhythm: Normal rate and regular rhythm.      Heart sounds: S1 normal and S2 normal. Murmur heard.   No friction rub. No gallop. No S3 or S4 sounds.    Pulmonary:      Effort: Pulmonary effort is normal. No respiratory distress.      Breath sounds: Normal breath sounds. No stridor. No wheezing or rales.   Chest:      Chest wall: No tenderness.   Abdominal:      General: Bowel sounds are normal. There is no distension.      Palpations: Abdomen is soft. There is no mass.      Tenderness: There is no abdominal tenderness. There is no rebound.   Genitourinary:     Comments: Deferred  Musculoskeletal:         General: No tenderness or deformity. Normal range of motion.      Cervical back: Normal range of motion and neck supple.   Lymphadenopathy:      Cervical: No cervical adenopathy.   Skin:     General: Skin is warm and dry.      Coloration: Skin is not pale.      Findings: No erythema or rash.   Neurological:      Mental Status: He is alert and oriented to person, place, and time.      Motor: No abnormal muscle tone.      Coordination: Coordination normal.   Psychiatric:         Behavior: Behavior normal.         Thought Content: Thought content normal.         Judgment: Judgment normal.         Lab Results   Component Value Date     07/20/2020    K 4.3 07/20/2020     07/20/2020    CO2 27 07/20/2020    BUN 11 07/20/2020    CREATININE 1.1 07/20/2020     (H) 07/20/2020    HGBA1C 7.5 (H) 09/02/2021    MG 1.8 07/20/2020    WBC  9.14 12/15/2021    HGB 7.5 (L) 12/15/2021    HCT 27.3 (L) 12/15/2021     (H) 12/15/2021     12/15/2021    BNP 91 07/20/2020     Assessment:      1. Chronic heart failure with preserved ejection fraction    2. Pulmonary hypertension    3. Essential hypertension    4. Abnormal stress test    5. Chronic obstructive pulmonary disease, unspecified COPD type    6. Other hyperlipidemia    7. Type 2 diabetes mellitus with other specified complication, unspecified whether long term insulin use    8. Chest pain, unspecified type    9. Other form of dyspnea    10. Abnormal nuclear stress test    11. Dyspnea, unspecified type        Plan:   1. Dyspnea with CP  - pharm nuclear stress test - abnormal - refer for R/LHC, deferred   - 2D ECHO with grade 2 DD, pulm HTN  - f/u with pulm eval    2. HTN with HFpEF  - cont meds and titrate    3. HLD  - cont statin    4. RA/DM2  - cont meds per PCP    5. COPD with pulm HTN  - cont tx per PCP/pulm  - order ECHO - eval PASP    6. Pre-OP CV evaluation prior to EGD/Cscope for anemia workup  Moderate periop risk of CV events for moderate risk procedure.  No chest pain, active arrhythmia and CHF symptoms.  Ok to proceed to the scheduled surgery without further cardiac study.  OK to hold Aspirin 7 days before the procedure and resume ASAP postop.  Continue Statin periop.    Thank you for allowing me to participate in this patient's care. Please do not hesitate to contact me with any questions or concerns. Consult note has been forwarded to the referral physician.

## 2022-01-11 ENCOUNTER — HOSPITAL ENCOUNTER (OUTPATIENT)
Dept: CARDIOLOGY | Facility: HOSPITAL | Age: 77
Discharge: HOME | End: 2022-01-11
Attending: INTERNAL MEDICINE
Payer: OTHER GOVERNMENT

## 2022-01-11 VITALS
DIASTOLIC BLOOD PRESSURE: 74 MMHG | BODY MASS INDEX: 27.3 KG/M2 | HEART RATE: 80 BPM | SYSTOLIC BLOOD PRESSURE: 126 MMHG | HEIGHT: 71 IN | WEIGHT: 195 LBS

## 2022-01-11 DIAGNOSIS — R94.39 ABNORMAL NUCLEAR STRESS TEST: ICD-10-CM

## 2022-01-11 DIAGNOSIS — R94.39 ABNORMAL STRESS TEST: ICD-10-CM

## 2022-01-11 DIAGNOSIS — I10 ESSENTIAL HYPERTENSION: ICD-10-CM

## 2022-01-11 DIAGNOSIS — E11.69 TYPE 2 DIABETES MELLITUS WITH OTHER SPECIFIED COMPLICATION, UNSPECIFIED WHETHER LONG TERM INSULIN USE: ICD-10-CM

## 2022-01-11 DIAGNOSIS — E78.49 OTHER HYPERLIPIDEMIA: ICD-10-CM

## 2022-01-11 DIAGNOSIS — R07.9 CHEST PAIN, UNSPECIFIED TYPE: ICD-10-CM

## 2022-01-11 DIAGNOSIS — I50.32 CHRONIC HEART FAILURE WITH PRESERVED EJECTION FRACTION: ICD-10-CM

## 2022-01-11 DIAGNOSIS — R06.00 DYSPNEA, UNSPECIFIED TYPE: ICD-10-CM

## 2022-01-11 DIAGNOSIS — J44.9 CHRONIC OBSTRUCTIVE PULMONARY DISEASE, UNSPECIFIED COPD TYPE: ICD-10-CM

## 2022-01-11 DIAGNOSIS — I27.20 PULMONARY HYPERTENSION: ICD-10-CM

## 2022-01-11 LAB
AORTIC ROOT ANNULUS: 2.83 CM
ASCENDING AORTA: 2.89 CM
AV INDEX (PROSTH): 0.32
AV MEAN GRADIENT: 25 MMHG
AV PEAK GRADIENT: 40 MMHG
AV VALVE AREA: 1.14 CM2
AV VELOCITY RATIO: 0.27
BSA FOR ECHO PROCEDURE: 2.1 M2
CV ECHO LV RWT: 0.51 CM
DOP CALC AO PEAK VEL: 3.18 M/S
DOP CALC AO VTI: 67.5 CM
DOP CALC LVOT AREA: 3.6 CM2
DOP CALC LVOT DIAMETER: 2.13 CM
DOP CALC LVOT PEAK VEL: 0.86 M/S
DOP CALC LVOT STROKE VOLUME: 77.28 CM3
DOP CALC RVOT PEAK VEL: 0.85 M/S
DOP CALC RVOT VTI: 14.3 CM
DOP CALCLVOT PEAK VEL VTI: 21.7 CM
E WAVE DECELERATION TIME: 204.35 MSEC
E/A RATIO: 0.77
E/E' RATIO: 15 M/S
ECHO EF ESTIMATED: 60 %
ECHO LV POSTERIOR WALL: 1.05 CM (ref 0.6–1.1)
EJECTION FRACTION: 55 %
FRACTIONAL SHORTENING: 32 % (ref 28–44)
INTERVENTRICULAR SEPTUM: 1.3 CM (ref 0.6–1.1)
IVRT: 82.78 MSEC
LA MAJOR: 4.95 CM
LA MINOR: 3.77 CM
LA WIDTH: 3.32 CM
LEFT ATRIUM SIZE: 3.41 CM
LEFT ATRIUM VOLUME INDEX MOD: 14.2 ML/M2
LEFT ATRIUM VOLUME INDEX: 19.7 ML/M2
LEFT ATRIUM VOLUME MOD: 29.67 CM3
LEFT ATRIUM VOLUME: 41.19 CM3
LEFT INTERNAL DIMENSION IN SYSTOLE: 2.79 CM (ref 2.1–4)
LEFT VENTRICLE DIASTOLIC VOLUME INDEX: 35.12 ML/M2
LEFT VENTRICLE DIASTOLIC VOLUME: 73.4 ML
LEFT VENTRICLE MASS INDEX: 79 G/M2
LEFT VENTRICLE SYSTOLIC VOLUME INDEX: 14.1 ML/M2
LEFT VENTRICLE SYSTOLIC VOLUME: 29.37 ML
LEFT VENTRICULAR INTERNAL DIMENSION IN DIASTOLE: 4.08 CM (ref 3.5–6)
LEFT VENTRICULAR MASS: 165.28 G
LV LATERAL E/E' RATIO: 13.13 M/S
LV SEPTAL E/E' RATIO: 17.5 M/S
LVOT MG: 1.88 MMHG
LVOT MV: 0.65 CM/S
MV PEAK A VEL: 1.37 M/S
MV PEAK E VEL: 1.05 M/S
MV STENOSIS PRESSURE HALF TIME: 59.26 MS
MV VALVE AREA P 1/2 METHOD: 3.71 CM2
PISA TR MAX VEL: 3.04 M/S
PULM VEIN S/D RATIO: 1.07
PV MEAN GRADIENT: 1.61 MMHG
PV PEAK D VEL: 0.48 M/S
PV PEAK S VEL: 0.51 M/S
PV PEAK VELOCITY: 1.2 CM/S
RA MAJOR: 4.11 CM
RA PRESSURE: 3 MMHG
RA WIDTH: 2.7 CM
RIGHT VENTRICULAR END-DIASTOLIC DIMENSION: 3.12 CM
SINUS: 3.36 CM
STJ: 2.81 CM
TDI LATERAL: 0.08 M/S
TDI SEPTAL: 0.06 M/S
TDI: 0.07 M/S
TR MAX PG: 37 MMHG
TV REST PULMONARY ARTERY PRESSURE: 40 MMHG

## 2022-01-11 PROCEDURE — 93306 TTE W/DOPPLER COMPLETE: CPT

## 2022-01-11 PROCEDURE — 93306 TTE W/DOPPLER COMPLETE: CPT | Mod: 26,,, | Performed by: INTERNAL MEDICINE

## 2022-01-11 PROCEDURE — 93306 ECHO (CUPID ONLY): ICD-10-PCS | Mod: 26,,, | Performed by: INTERNAL MEDICINE

## 2022-01-12 ENCOUNTER — TELEPHONE (OUTPATIENT)
Dept: CARDIOLOGY | Facility: CLINIC | Age: 77
End: 2022-01-12
Payer: OTHER GOVERNMENT

## 2022-01-12 NOTE — TELEPHONE ENCOUNTER
Pt was contacted about results:    Please contact the patient and let them know that their echo reveals Overall normal heart function with mild stiffness of heart (diastolic dysfunction). Continue low salt diet and good blood pressure control. Mild aortic valve narrowing and elevated pressure in right side of heart will continue to monitor it.     Pt verbalized understanding with no questions or concerns.        ----- Message from Dominic Carroll MD sent at 1/12/2022 10:33 AM CST -----  Please contact the patient and let them know that their echo reveals Overall normal heart function with mild stiffness of heart (diastolic dysfunction). Continue low salt diet and good blood pressure control. Mild aortic valve narrowing and elevated pressure in right side of heart will continue to monitor it.

## 2022-02-04 ENCOUNTER — CLINICAL SUPPORT (OUTPATIENT)
Dept: PULMONOLOGY | Facility: CLINIC | Age: 77
End: 2022-02-04
Payer: OTHER GOVERNMENT

## 2022-02-04 ENCOUNTER — OFFICE VISIT (OUTPATIENT)
Dept: PULMONOLOGY | Facility: CLINIC | Age: 77
End: 2022-02-04
Payer: OTHER GOVERNMENT

## 2022-02-04 VITALS
DIASTOLIC BLOOD PRESSURE: 86 MMHG | SYSTOLIC BLOOD PRESSURE: 126 MMHG | BODY MASS INDEX: 26.42 KG/M2 | OXYGEN SATURATION: 97 % | WEIGHT: 188.69 LBS | RESPIRATION RATE: 16 BRPM | HEART RATE: 92 BPM | HEIGHT: 71 IN

## 2022-02-04 VITALS — WEIGHT: 188.69 LBS | HEIGHT: 71 IN | BODY MASS INDEX: 26.42 KG/M2

## 2022-02-04 DIAGNOSIS — Z01.811 PRE-PROCEDURAL RESPIRATORY EXAMINATION: Primary | ICD-10-CM

## 2022-02-04 DIAGNOSIS — J42 CHRONIC BRONCHITIS, UNSPECIFIED CHRONIC BRONCHITIS TYPE: ICD-10-CM

## 2022-02-04 DIAGNOSIS — I10 ESSENTIAL HYPERTENSION: ICD-10-CM

## 2022-02-04 DIAGNOSIS — I27.20 PULMONARY HYPERTENSION: ICD-10-CM

## 2022-02-04 DIAGNOSIS — J42 CHRONIC BRONCHITIS, UNSPECIFIED CHRONIC BRONCHITIS TYPE: Primary | ICD-10-CM

## 2022-02-04 DIAGNOSIS — I50.32 CHRONIC HEART FAILURE WITH PRESERVED EJECTION FRACTION: ICD-10-CM

## 2022-02-04 LAB
ALLENS TEST: ABNORMAL
CTP QC/QA: YES
DELSYS: ABNORMAL
FIO2: 21
HCO3 UR-SCNC: 25.8 MMOL/L (ref 24–28)
MODE: ABNORMAL
PCO2 BLDA: 41.3 MMHG (ref 35–45)
PH SMN: 7.4 [PH] (ref 7.35–7.45)
PO2 BLDA: 76 MMHG (ref 80–100)
POC BE: 1 MMOL/L
POC SATURATED O2: 95 % (ref 95–100)
SAMPLE: ABNORMAL
SARS-COV-2 AG RESP QL IA.RAPID: NEGATIVE
SITE: ABNORMAL

## 2022-02-04 PROCEDURE — 94726 PLETHYSMOGRAPHY LUNG VOLUMES: CPT | Mod: PBBFAC

## 2022-02-04 PROCEDURE — 99211 OFF/OP EST MAY X REQ PHY/QHP: CPT | Mod: PBBFAC,25

## 2022-02-04 PROCEDURE — 99999 PR PBB SHADOW E&M-EST. PATIENT-LVL I: CPT | Mod: PBBFAC,,,

## 2022-02-04 PROCEDURE — 94010 BREATHING CAPACITY TEST: ICD-10-PCS | Mod: 26,59,S$PBB, | Performed by: INTERNAL MEDICINE

## 2022-02-04 PROCEDURE — 94762 N-INVAS EAR/PLS OXIMTRY CONT: CPT | Mod: PBBFAC

## 2022-02-04 PROCEDURE — 99215 OFFICE O/P EST HI 40 MIN: CPT | Mod: PBBFAC,27 | Performed by: PHYSICIAN ASSISTANT

## 2022-02-04 PROCEDURE — 99999 PR PBB SHADOW E&M-EST. PATIENT-LVL I: ICD-10-PCS | Mod: PBBFAC,,,

## 2022-02-04 PROCEDURE — 94729 PR C02/MEMBANE DIFFUSE CAPACITY: ICD-10-PCS | Mod: 26,S$PBB,, | Performed by: INTERNAL MEDICINE

## 2022-02-04 PROCEDURE — 94726 PLETHYSMOGRAPHY LUNG VOLUMES: CPT | Mod: 26,S$PBB,, | Performed by: INTERNAL MEDICINE

## 2022-02-04 PROCEDURE — 94729 DIFFUSING CAPACITY: CPT | Mod: 26,S$PBB,, | Performed by: INTERNAL MEDICINE

## 2022-02-04 PROCEDURE — 36600 WITHDRAWAL OF ARTERIAL BLOOD: CPT | Mod: 59,S$PBB,, | Performed by: INTERNAL MEDICINE

## 2022-02-04 PROCEDURE — 36600 WITHDRAWAL OF ARTERIAL BLOOD: CPT | Mod: PBBFAC

## 2022-02-04 PROCEDURE — 94618 PULMONARY STRESS TESTING: CPT | Mod: 26,S$PBB,, | Performed by: INTERNAL MEDICINE

## 2022-02-04 PROCEDURE — 94010 BREATHING CAPACITY TEST: CPT | Mod: PBBFAC

## 2022-02-04 PROCEDURE — 99211 OFF/OP EST MAY X REQ PHY/QHP: CPT | Mod: PBBFAC,27

## 2022-02-04 PROCEDURE — 36600 PR WITHDRAWAL OF ARTERIAL BLOOD: ICD-10-PCS | Mod: 59,S$PBB,, | Performed by: INTERNAL MEDICINE

## 2022-02-04 PROCEDURE — 99999 PR PBB SHADOW E&M-EST. PATIENT-LVL V: ICD-10-PCS | Mod: PBBFAC,,, | Performed by: PHYSICIAN ASSISTANT

## 2022-02-04 PROCEDURE — 99204 PR OFFICE/OUTPT VISIT, NEW, LEVL IV, 45-59 MIN: ICD-10-PCS | Mod: S$PBB,,, | Performed by: PHYSICIAN ASSISTANT

## 2022-02-04 PROCEDURE — 99999 PR PBB SHADOW E&M-EST. PATIENT-LVL V: CPT | Mod: PBBFAC,,, | Performed by: PHYSICIAN ASSISTANT

## 2022-02-04 PROCEDURE — 94010 BREATHING CAPACITY TEST: CPT | Mod: 26,59,S$PBB, | Performed by: INTERNAL MEDICINE

## 2022-02-04 PROCEDURE — 94618 PULMONARY STRESS TESTING: ICD-10-PCS | Mod: 26,S$PBB,, | Performed by: INTERNAL MEDICINE

## 2022-02-04 PROCEDURE — 82803 BLOOD GASES ANY COMBINATION: CPT | Mod: PBBFAC

## 2022-02-04 PROCEDURE — 94618 PULMONARY STRESS TESTING: CPT | Mod: PBBFAC

## 2022-02-04 PROCEDURE — 94726 PULM FUNCT TST PLETHYSMOGRAP: ICD-10-PCS | Mod: 26,S$PBB,, | Performed by: INTERNAL MEDICINE

## 2022-02-04 PROCEDURE — 99204 OFFICE O/P NEW MOD 45 MIN: CPT | Mod: S$PBB,,, | Performed by: PHYSICIAN ASSISTANT

## 2022-02-04 PROCEDURE — 94729 DIFFUSING CAPACITY: CPT | Mod: PBBFAC

## 2022-02-04 NOTE — PROGRESS NOTES
Subjective:       Patient ID: Navdeep Sesay Jr. is a 76 y.o. male.    Chief Complaint: COPD      75yo male here for COPD, pulm HTN  Hx of HFpEF, pulm HTN, HTN, HLD, COPD, anemia, GERD, PMR, RA, DM2   [facility], has not been seen here in pulmonary clinic prior  Requesting clearance for colonoscopy  His current COPD regimen includes Trelegy daily, and albuterol prn; he feels he benefits from this regimen and would like to continue  He is currently on 2LPM NC, he says he does not use oxygen everyday, only when he feels like he needs it  He reports he is able to complete ADLs without issues but mentions he sometimes gets out of breath when doing too much around the house, when he gets SOB he is able to stop and take deep breaths for relief  Of note 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   Tobacco Use: Low Risk     Smoking Tobacco Use: Never Smoker    Smokeless Tobacco Use: Never Used      History reviewed. No pertinent past medical history.   Current Outpatient Medications on File Prior to Visit   Medication Sig Dispense Refill    adalimumab (HUMIRA) 10 mg/0.2 mL SyKt Inject 10 mg into the skin every 14 (fourteen) days.      albuterol (ACCUNEB) 1.25 mg/3 mL Nebu Take 1.25 mg by nebulization every 6 (six) hours as needed. Rescue      alendronate (FOSAMAX) 35 MG tablet Take 35 mg by mouth every 7 days.      alogliptin (NESINA) 25 mg Tab Take by mouth.      amLODIPine (NORVASC) 10 MG tablet Take 10 mg by mouth once daily.      aspirin (ECOTRIN) 81 MG EC tablet Take 1 tablet (81 mg total) by mouth once daily. 30 tablet 0    atorvastatin (LIPITOR) 80 MG tablet Take 80 mg by mouth once daily.      budesonide-formoterol 80-4.5 mcg (SYMBICORT) 80-4.5 mcg/actuation HFAA  Inhale 2 puffs into the lungs. Controller      calcium carbonate 500 mg/5 mL (1,250 mg/5 mL) Take by mouth once.      calcium carbonate-vitamin D3 500mg (1,250mg) -600 unit Tab Take by mouth once.      carboxymethylcellulose (REFRESH PLUS) 0.5 % Dpet 1 drop 3 (three) times daily as needed.      cetirizine (ZYRTEC) 10 MG tablet Take 10 mg by mouth once daily.      folic acid (FOLVITE) 1 MG tablet Take 1 mg by mouth once daily.      guaiFENesin (MUCINEX) 600 mg 12 hr tablet Take 800 mg by mouth 2 (two) times daily.      hydroxychloroquine (PLAQUENIL) 200 mg tablet Take by mouth once daily.      losartan (COZAAR) 100 MG tablet Take 100 mg by mouth once daily.      methotrexate 2.5 MG Tab Take by mouth every 7 days.      montelukast (SINGULAIR) 10 mg tablet Take 10 mg by mouth every evening.      omeprazole (PRILOSEC) 20 MG capsule Take 20 mg by mouth once daily.      polyvinyl alcohol, artificial tears, (LIQUIFILM TEARS) 1.4 % ophthalmic solution 1 drop as needed.      predniSONE (DELTASONE) 5 MG tablet Take 10 mg by mouth once daily.       tamsulosin (FLOMAX) 0.4 mg Cap Take 0.4 mg by mouth once daily.       No current facility-administered medications on file prior to visit.        Review of Systems   Constitutional: Negative for fever, weight loss, appetite change, fatigue and weakness.   HENT: Negative for postnasal drip, rhinorrhea, sinus pressure, trouble swallowing and congestion.    Respiratory: Positive for cough and dyspnea on extertion. Negative for sputum production, choking, chest tightness, shortness of breath and wheezing.    Cardiovascular: Negative for chest pain and leg swelling.   Musculoskeletal: Positive for arthralgias and gait problem. Negative for joint swelling.   Gastrointestinal: Negative for nausea, vomiting and abdominal pain.   Neurological: Negative for dizziness, weakness and headaches.   All other systems reviewed and are negative.      Objective:       Vitals:    02/04/22  "0836   BP: 126/86   Pulse: 92   Resp: 16   SpO2: 97%   Weight: 85.6 kg (188 lb 11.4 oz)   Height: 5' 11" (1.803 m)       Physical Exam   Constitutional: He is oriented to person, place, and time. He appears well-developed and well-nourished. No distress.   HENT:   Head: Normocephalic.   Mouth/Throat: Oropharynx is clear and moist.   Cardiovascular: Normal rate and regular rhythm.   Murmur heard.  Pulmonary/Chest: Effort normal and breath sounds normal. No respiratory distress. He has no wheezes. He has no rhonchi. He has no rales.   Musculoskeletal:         General: No edema.      Cervical back: Normal range of motion and neck supple.   Lymphadenopathy: No supraclavicular adenopathy is present.     He has no cervical adenopathy.   Neurological: He is alert and oriented to person, place, and time. Gait normal.   Skin: Skin is warm and dry.   Psychiatric: He has a normal mood and affect.   Vitals reviewed.    Personal Diagnostic Review    1/11/22 Reading physician: Dominic Carroll MD  Echo  · The left ventricle is normal in size with concentric remodeling and   normal systolic function.  · The estimated ejection fraction is 55%.  · Grade I left ventricular diastolic dysfunction.  · Normal right ventricular size with normal right ventricular systolic   function.  · There is moderate aortic valve stenosis.  · Aortic valve area is 1.14 cm2; peak velocity is 3.18 m/s; mean gradient   is 25 mmHg.  · Mild tricuspid regurgitation.  · Mild mitral regurgitation.  · Normal central venous pressure (3 mmHg).  · The estimated PA systolic pressure is 40 mmHg.       PRE OPERATIVE PULMONARY RISK ASSESSMENT:      SURGEON: Dr. Gerardo MD (GI)  PROCEDURE: colonoscopy  DURATION: <1hour      Patient is a Moderate risk for perioperative pulmonary complications due to:               [x]            COPD (FEV1 49.5% of predicted)                [x]            Chronic respiratory failure with hypoxia requiring supplemental oxygen with " activity  [x]            Restrictive lung disease   []            Pleural disease   []            Pulmonary vasculitis  []            Hypoventilation ( chest wall deformity, neuromuscular disease, obesity etc)  [x]            Anemia  []            Thyroid disease.  []            Cardiac illess  []            General Anesthsia   []            long-acting neuromuscular blockade.     Risks and possible pulmonary complications of surgery include risk of respiratory failure requiring mechanical ventilation, post operative pneumonia, post operative atelectasis, deep venous thrombosis, pulmonary embolism and death.    Based on my assessment , it is okay to proceed with procedure PROVIDED RISK IS ACCEPTABLE TO BOTH THE PATIENT AND THE SURGEON PERFORMING THE SURGERY.     ARISCAT Score 14        0 to 25 points: Low risk: 1.6% pulmonary complication rate   26 to 44 points: Intermediate risk: 13.3% pulmonary complication rate   45 to 123 points: High risk: 42.1% pulmonary complication rate            RECOMMENDATIONS FOR RISK MITIGATION:    1. Preoperative pulmonary workup:  Chest X Ray, ABG, PFT, 6mwd (completed 2/10/2022)  2. Change in medication regimen before surgery: none, continue medication regimen including morning of surgery, with sip of water.  3. Prophylaxis for cardiac events with perioperative beta-blockers: should be considered, specific regimen per anesthesia.  4. Invasive hemodynamic monitoring perioperatively: should be considered.  5. Deep vein thrombosis prophylaxis postoperatively:regimen to be chosen by surgical team.  6. Surveillance for postoperative MI with ECG immediately postoperatively and on postoperative days 1 and 2 AND troponin levels 24 hours postoperatively and on day 4 or hospital discharge (whichever comes first): should be considered.  7. Begin patient education regarding lung-expansion maneuvers like deep breathing and incentive spirometry.   8. Limit duration of surgery to less than 3 hr if  possible.  9. Use spinal or epidural anesthesia if possible.  10. Avoid use of pancuronium or long acting neuromuscular blockers.  11. Use deep-breathing exercises or incentive spirometry.  12. Assure perioperative thromboprophylaxis and adequate post operative analgesia.        Assessment/Plan:       Problem List Items Addressed This Visit        Pulmonary    COPD (chronic obstructive pulmonary disease) - Primary     Stable, no distress  Trelegy daily, albuterol prn  cxr and labs today, PFT, ABG, 6mwd, overnight O2         Relevant Orders    Complete PFT without bronchodilator - Clinic    Stress test, pulmonary    TRACIE Screen w/Reflex    ALPHA 1 ANTITRYPSIN PHENOTYPE    Alpha-1-Antitrypsin    Arterial Blood Gas    PULSE OXIMETRY OVERNIGHT    X-Ray Chest PA And Lateral    Pulmonary hypertension     Estimated PA pressure 40mmHg on ECHO 1/2022  cxr and labs today, PFT, ABG, 6mwd, overnight O2         Relevant Orders    Complete PFT without bronchodilator - Clinic    Stress test, pulmonary    TRACIE Screen w/Reflex    ALPHA 1 ANTITRYPSIN PHENOTYPE    Alpha-1-Antitrypsin    Arterial Blood Gas    PULSE OXIMETRY OVERNIGHT    X-Ray Chest PA And Lateral       Cardiac/Vascular    Essential hypertension     Stable, F/u regularly with PCP              Other    Chronic heart failure with preserved ejection fraction     F/u regularly with cardiology  No sob or leg swelling today               Follow up after pulmonary testing complete next available.     Discussed diagnosis, its evaluation, treatment and usual course. All questions answered.    Patient verbalized understanding of plan and left in no acute distress    Thank you for the courtesy of participating in the care of this patient    Kate Madrigal PA-C

## 2022-02-04 NOTE — ASSESSMENT & PLAN NOTE
Stable, no distress  Trelegy daily, albuterol prn  cxr and labs today, PFT, ABG, 6mwd, overnight O2

## 2022-02-04 NOTE — PROCEDURES
See ABG Results  Results for JAIME CANTU JR. (MRN [id number]) as of 2/6/2022 12:51   Ref. Range 2/4/2022 14:20   POC PH Latest Ref Range: 7.35 - 7.45  7.404   POC PCO2 Latest Ref Range: 35 - 45 mmHg 41.3   POC PO2 Latest Ref Range: 80 - 100 mmHg 76 (L)   POC BE Latest Ref Range: -2 to 2 mmol/L 1   POC HCO3 Latest Ref Range: 24 - 28 mmol/L 25.8   POC SATURATED O2 Latest Ref Range: 95 - 100 % 95   FiO2 Unknown 21   Sample Unknown ARTERIAL   DelSys Unknown Room Air   Allens Test Unknown Pass   Site Unknown RR   Mode Unknown SPONT       Interpretation:  Arterial blood gases on room air are abnormal demonstrating hypoxemia.  Som Bassett MD  Pulmonary / Critical Care Medicine      The table below summarizes the main interpretations of the relationship between the arterial blood gases, pH, pCO2 and HCO-3

## 2022-02-04 NOTE — PROCEDURES
"[facility] - Pulmonary Function  Six Minute Walk     SUMMARY     Ordering Provider: Rohan TIRADO   Interpreting Provider: Dr Bassett  Performing nurse/tech/RT: RADHA RRT  Diagnosis:  (chronic bronchitis, pulmonary hypertension)  Height: 5' 11" (180.3 cm)  Weight: 85.6 kg (188 lb 11.4 oz)  BMI (Calculated): 26.3   Patient Race:             Phase Oxygen Assessment Supplemental O2 Heart   Rate Blood Pressure Janeen Dyspnea Scale Rating   Resting 95 % Room Air 94 bpm 123/59 0   Exercise        Minute        1 95 % Room Air 104 bpm     2 90 % Room Air 105 bpm     3 88 % (pt. placed on NC 2lpm) Room Air 107 bpm     4 93 % 2 L/M 101 bpm     5 97 % 2 L/M 98 bpm     6  97 % 2 L/M 104 bpm 129/60 3   Recovery        Minute        1 96 % 2 L/M 97 bpm     2 99 % 2 L/M 94 bpm     3 100 % 2 L/M 93 bpm     4 100 % 2 L/M 89 bpm 125/58 2     Six Minute Walk Summary  6MWT Status: completed with stops  Patient Reported: Dyspnea,Lightheadedness,Dizziness     Interpretation:  Did the patient stop or pause?: Yes  How many times did the patient stop or pause?: 2  Stop Time 1: 105  Restart Time 1: 144  Pause Time 1: 39 seconds  Stop Time 2: 200  Restart Time 2: 300  Pause Time 2: 100 seconds                    Total Time Walked (Calculated): 221 seconds  Final Partial Lap Distance (feet): 0 feet  Total Distance Meters (Calculated): 121.92 meters  Predicted Distance Meters (Calculated): 513.66 meters  Percentage of Predicted (Calculated): 23.74  Peak VO2 (Calculated): 7.64  Mets: 2.18  Has The Patient Had a Previous Six Minute Walk Test?: No       Previous 6MWT Results  Has The Patient Had a Previous Six Minute Walk Test?: No    Interpretation:  Total distance walked in six minutes is severely reduced indicating an severe reduction in functional capacity.  There was significant severe oxygen desaturation to 88 % with exercise on room air (oxygen saturation less than 89%). Patient was exercised with supplemental oxygen as noted above. Clinical " correlation suggested.    Patient met criteria for oxygen prescription.  [] Mild exercise-induced hypoxemia described as an arterial oxygen saturation of 93-95% (or 3-4% less than at rest),  [] Moderate exercise-induced hypoxemia as 89-93%  [x] Severe exercise induced hypoxemia as < 89% O2 saturation.  Medicare Criteria for oxygen prescription comments:   When arterial oxygen saturation is at or below 88% during exercise on room air (severe exercise induced hypoxemia) then the patient falls under Medicare Group 1 criteria for supplemental oxygen prescription.  Details about Medicare Group Criteria coverage can be found at http://www.cms.[facility].gov/manuals/downloads/     Som Bassett MD

## 2022-02-07 ENCOUNTER — TELEPHONE (OUTPATIENT)
Dept: GASTROENTEROLOGY | Facility: CLINIC | Age: 77
End: 2022-02-07
Payer: OTHER GOVERNMENT

## 2022-02-07 DIAGNOSIS — R06.02 SOB (SHORTNESS OF BREATH): Primary | ICD-10-CM

## 2022-02-07 LAB
BRPFT: NORMAL
BRPFT: NORMAL
DLCO ADJ PRE: 11.68 ML/(MIN*MMHG)
DLCO SINGLE BREATH LLN: 19.41
DLCO SINGLE BREATH PRE REF: 31.9 %
DLCO SINGLE BREATH REF: 26.34
DLCOC SBVA LLN: 2.5
DLCOC SBVA PRE REF: 91.1 %
DLCOC SBVA REF: 3.61
DLCOC SINGLE BREATH LLN: 19.41
DLCOC SINGLE BREATH PRE REF: 44.4 %
DLCOC SINGLE BREATH REF: 26.34
DLCOVA LLN: 2.5
DLCOVA PRE REF: 65.6 %
DLCOVA PRE: 2.36 ML/(MIN*MMHG*L)
DLCOVA REF: 3.61
DLVAADJ PRE: 3.29 ML/(MIN*MMHG*L)
ERV LLN: -16449.02
ERV PRE REF: 44.9 %
ERV REF: 0.98
FEF 25 75 LLN: 0.83
FEF 25 75 PRE REF: 19.1 %
FEF 25 75 REF: 2.13
FEV1 FVC LLN: 60
FEV1 FVC PRE REF: 64 %
FEV1 FVC REF: 75
FEV1 LLN: 2.1
FEV1 PRE REF: 49.5 %
FEV1 REF: 3.01
FRCPLETH LLN: 2.84
FRCPLETH PREREF: 57.9 %
FRCPLETH REF: 3.82
FVC LLN: 2.94
FVC PRE REF: 76.7 %
FVC REF: 4.06
IVC PRE: 2.24 L
IVC SINGLE BREATH LLN: 2.94
IVC SINGLE BREATH PRE REF: 55.2 %
IVC SINGLE BREATH REF: 4.06
MVV LLN: 102
MVV PRE REF: 44.3 %
MVV REF: 120
PEF LLN: 5.3
PEF PRE REF: 56.5 %
PEF REF: 7.68
PRE DLCO: 8.41 ML/(MIN*MMHG)
PRE ERV: 0.44 L
PRE FEF 25 75: 0.41 L/S
PRE FET 100: 17.58 SEC
PRE FEV1 FVC: 47.8 %
PRE FEV1: 1.49 L
PRE FRC PL: 2.22 L
PRE FVC: 3.12 L
PRE MVV: 53 L/MIN
PRE PEF: 4.34 L/S
PRE RV: 1.28 L
PRE TLC: 4.4 L
RAW LLN: 3.06
RAW PRE REF: 209.8 %
RAW PRE: 6.42 CMH2O*S/L
RAW REF: 3.06
RV LLN: 2.17
RV PRE REF: 44.9 %
RV REF: 2.84
RVTLC LLN: 35
RVTLC PRE REF: 65.4 %
RVTLC PRE: 29.03 %
RVTLC REF: 44
TLC LLN: 6.15
TLC PRE REF: 60.2 %
TLC REF: 7.3
VA PRE: 3.57 L
VA SINGLE BREATH LLN: 7.15
VA SINGLE BREATH PRE REF: 49.9 %
VA SINGLE BREATH REF: 7.15
VC LLN: 2.94
VC PRE REF: 76.8 %
VC PRE: 3.12 L
VC REF: 4.06
VTGRAWPRE: 2.79 L

## 2022-02-07 NOTE — PROCEDURES
[address] * [place] [address]  Telephone: [phone]  Test date: 22 Start: 22 21:46:13 Navdeep Sesay  Doctor: MYCHAL Madrigal End: 22 07:06:21 [id number]  Oximetry: Summary Report  Comments: Room Air  Recording time: 09:20:08 Highest pulse: 101 Highest SpO2: 99%  Excluded samplin:05:32 Lowest pulse: 70 Lowest SpO2: 85%  Total valid samplin:14:36 Mean pulse: 79 Mean SpO2: 97.5%  1 S.D.: 3.4 1 S.D.: 1.1  Time with SpO2<90: 0:02:08, 0.4%  Time with SpO2<80: 0:00:00, 0.0%  Time with SpO2<70: 0:00:00, 0.0%  Time with SpO2<60: 0:00:00, 0.0%  Time with SpO2<89: 0:01:36, 0.3%  Time with SpO2 =>90: 9:12:28, 99.6%  Time with SpO2=>80 & <90: 0:02:08, 0.4%  Time with SpO2=>70 & <80: 0:00:00, 0.0%  Time with SpO2=>60 & <70: 0:00:00, 0.0%  The longest continuous time with saturation <=88 was 00:00:48, which started at  22 02:14:01.  A desaturation event was defined as a decrease of saturation by 4 or more.  No events were excluded due to artifact.  There were no desaturation events over 3 minutes duration.  There were 9 desaturation events of less than 3 minutes duration during which:  The mean high was 98.2%. The mean low was 93.8%.  The number of these events that were:  > 0 & <10 seconds: 1 > 0 seconds: 9  =>10 & <20 seconds: 3 =>10 seconds: 8  =>20 & <30 seconds: 2 =>20 seconds: 5  =>30 & <40 seconds: 1 =>30 seconds: 3  =>40 & <50 seconds: 0 =>40 seconds: 2  =>50 & <60 seconds: 0 =>50 seconds: 2  =>60 seconds: 2 =>60 seconds: 2  The mean length of desaturation events that were >=10 sec & <=3 mins was: 41.5 sec.  Desaturation event index (events >=10 sec per sampled hour): 0.9  Desaturation event index (events >= 0 sec per sampled hour): 1.0  ©     Overnight Oxygen Saturation Study:    INTERPRETATION:  Overnight O2 saturation study reviewed.  Conditions of testing: Noted above in report.    Normal overnight oxygenation Time with SpO2<89: 0:01:36, 0.3% of the study period.  Lowest oxygen saturation recorded was 85%. Clinical correlation suggested.    Som Bassett MD  Pulmonary / Critical Care Medicine    Medicare Criteria Comments:   Oximetry test results suggest the patient does not fall under Medicare Group 1 Criteria for oxygen prescription.   (Arterial oxygen saturation at or below 88% for at least 5 minutes taken during sleep)    Details about Medicare Group Criteria coverage can be found at http://www.cms.[facility].gov/manuals/downloads/

## 2022-02-07 NOTE — TELEPHONE ENCOUNTER
Requesting clearance due to patient pulmonology history for patient to move forward with scope due to anemia. Is patient safe to undergo procedure.

## 2022-02-08 ENCOUNTER — HOSPITAL ENCOUNTER (OUTPATIENT)
Dept: RADIOLOGY | Facility: HOSPITAL | Age: 77
Discharge: HOME | End: 2022-02-08
Attending: PHYSICIAN ASSISTANT
Payer: OTHER GOVERNMENT

## 2022-02-08 DIAGNOSIS — R06.02 SOB (SHORTNESS OF BREATH): ICD-10-CM

## 2022-02-08 PROCEDURE — 71046 X-RAY EXAM CHEST 2 VIEWS: CPT | Mod: 26,,, | Performed by: RADIOLOGY

## 2022-02-08 PROCEDURE — 71046 XR CHEST PA AND LATERAL: ICD-10-PCS | Mod: 26,,, | Performed by: RADIOLOGY

## 2022-02-08 PROCEDURE — 71046 X-RAY EXAM CHEST 2 VIEWS: CPT | Mod: TC

## 2022-02-15 NOTE — TELEPHONE ENCOUNTER
Per Kate Madrigal NP:    Patient is a Moderate risk for perioperative pulmonary complications due to:   COPD (FEV 1 49.5% of predicted)  Chronic respiratory failure with hypoxia requiring supplemental oxygen with activity  Restrictive lung disease  Anemia

## 2022-02-28 ENCOUNTER — OFFICE VISIT (OUTPATIENT)
Dept: GASTROENTEROLOGY | Facility: CLINIC | Age: 77
End: 2022-02-28
Payer: OTHER GOVERNMENT

## 2022-02-28 VITALS
OXYGEN SATURATION: 91 % | WEIGHT: 189.06 LBS | HEIGHT: 71 IN | BODY MASS INDEX: 26.47 KG/M2 | DIASTOLIC BLOOD PRESSURE: 76 MMHG | SYSTOLIC BLOOD PRESSURE: 154 MMHG | HEART RATE: 102 BPM

## 2022-02-28 DIAGNOSIS — D64.9 ANEMIA, UNSPECIFIED TYPE: Primary | ICD-10-CM

## 2022-02-28 PROCEDURE — 99999 PR PBB SHADOW E&M-EST. PATIENT-LVL V: CPT | Mod: PBBFAC,,, | Performed by: PHYSICIAN ASSISTANT

## 2022-02-28 PROCEDURE — 99214 OFFICE O/P EST MOD 30 MIN: CPT | Mod: S$PBB,,, | Performed by: PHYSICIAN ASSISTANT

## 2022-02-28 PROCEDURE — 99215 OFFICE O/P EST HI 40 MIN: CPT | Mod: PBBFAC | Performed by: PHYSICIAN ASSISTANT

## 2022-02-28 PROCEDURE — 99999 PR PBB SHADOW E&M-EST. PATIENT-LVL V: ICD-10-PCS | Mod: PBBFAC,,, | Performed by: PHYSICIAN ASSISTANT

## 2022-02-28 PROCEDURE — 99214 PR OFFICE/OUTPT VISIT, EST, LEVL IV, 30-39 MIN: ICD-10-PCS | Mod: S$PBB,,, | Performed by: PHYSICIAN ASSISTANT

## 2022-02-28 NOTE — PROGRESS NOTES
Subjective:      Patient ID: Navdeep Sesay Jr. is a 76 y.o. male.    Chief Complaint: Colonoscopy (Discuss risk factors)    HPI:  Patient with history of COPD, PMR, CHF, previous Listeria infection reports to clinic today for follow up of the above. [facility] Iron studies were ordered with showed elevated iron. Patient had history of hemoglobin 11.4 in May 2021 but dropped to 8 a few months later. He has been anemic with iron deficiency near the middle/end of 2021, but most recent iron elevated. He denies taking any iron supplementation. Notes he was transfused 2 units before Christmas which brought him up to 8.9 hgb. Repeat hgb down to 8.3 per patient's daughter. She reports patient follows with Dr. uHng (heme/onc) and gets labs every 2 weeks for monitoring. Continues to deny any overt bleeding. Mentions he was told he has B12 deficiency and is getting replacement - states he feels much improved with this treatment and is hesitant for scopes unless absolutely necessary. Patient has been cleared for EGD/colonoscopy with moderate risk by pulmonology and cardiology.    Iron 12/15/21 - 178  Sat iron 12/15/21 - 53    Iron 9/2021 - 51    Iron 7/3/21 - 13  Sat iron 7/3/21 - 5      Review of Systems   Constitutional: Negative for activity change, appetite change, chills, diaphoresis and fatigue.   HENT: Negative for trouble swallowing.    Respiratory: Positive for shortness of breath (chronic - uses O2 as needed).    Cardiovascular: Negative for chest pain.   Gastrointestinal: Negative for abdominal distention, abdominal pain, anal bleeding, blood in stool, constipation, diarrhea, nausea and vomiting.   Skin: Negative for color change.   Neurological: Negative for dizziness, syncope and weakness.   Psychiatric/Behavioral: Negative for dysphoric mood. The patient is not nervous/anxious.        Medical History: Reviewed    Social History: Reviewed    Allergies: Reviewed    Objective:      Physical Exam  Constitutional:       General: He is not in acute distress.     Appearance: Normal appearance. He is obese. He is not ill-appearing, toxic-appearing or diaphoretic.   HENT:      Head: Normocephalic and atraumatic.   Cardiovascular:      Rate and Rhythm: Normal rate and regular rhythm.      Heart sounds: Murmur heard.   Pulmonary:      Effort: Pulmonary effort is normal. No respiratory distress.      Comments: Not currently using O2  Abdominal:      General: Bowel sounds are normal. There is no distension.      Palpations: Abdomen is soft.      Tenderness: There is no abdominal tenderness.   Musculoskeletal:      Cervical back: Normal range of motion.      Right lower leg: No edema.      Left lower leg: No edema.   Skin:     General: Skin is warm and dry.      Coloration: Skin is pale (mild). Skin is not jaundiced.   Neurological:      Mental Status: He is alert and oriented to person, place, and time.         Assessment:     1. Anemia, unspecified type        Plan:     -Discussed with Dr. Bravo in clinic.  -Call placed to Dr. Hung to discuss next steps.  -Due to patient COPD, pulm HTN, obesity, CHF, he is at higher risk of scopes. Patient is hesitant for scopes. Will discuss further with heme/onc to formulate plan for patient moving forward.  -No overt bleeding. Feeling much improved with B12 therapy.  -Previous FRANNY, however now has elevated iron levels.    Navdeep was seen today for colonoscopy.    Diagnoses and all orders for this visit:    Anemia, unspecified type        No follow-ups on file.    Thank you for the opportunity to participate in the care of this patient.   Naty Mora PA-C.

## 2022-03-01 ENCOUNTER — TELEPHONE (OUTPATIENT)
Dept: GASTROENTEROLOGY | Facility: CLINIC | Age: 77
End: 2022-03-01
Payer: OTHER GOVERNMENT

## 2022-03-01 NOTE — TELEPHONE ENCOUNTER
Spoke with patient via telephone. Explained that I had just spoken with heme/onc physician, Dr. Hung who believes his anemia is due to severe B12 deficiency. Patient has appointment in the next few weeks for follow up with heme/onc - Dr. Hung states he will follow up with patient's labs and continue to monitor. If symptoms worsen and he believes scopes are necessary, he will refer back to us at [facility]. Patient verbalized understanding and agrees to plan.

## 2022-03-15 ENCOUNTER — OFFICE VISIT (OUTPATIENT)
Dept: CARDIOLOGY | Facility: CLINIC | Age: 77
End: 2022-03-15
Payer: OTHER GOVERNMENT

## 2022-03-15 VITALS
DIASTOLIC BLOOD PRESSURE: 62 MMHG | WEIGHT: 181.88 LBS | OXYGEN SATURATION: 92 % | HEART RATE: 93 BPM | SYSTOLIC BLOOD PRESSURE: 110 MMHG | BODY MASS INDEX: 25.46 KG/M2 | HEIGHT: 71 IN

## 2022-03-15 DIAGNOSIS — I10 ESSENTIAL HYPERTENSION: ICD-10-CM

## 2022-03-15 DIAGNOSIS — I27.20 PULMONARY HYPERTENSION: ICD-10-CM

## 2022-03-15 DIAGNOSIS — E11.69 TYPE 2 DIABETES MELLITUS WITH OTHER SPECIFIED COMPLICATION, UNSPECIFIED WHETHER LONG TERM INSULIN USE: ICD-10-CM

## 2022-03-15 DIAGNOSIS — E78.49 OTHER HYPERLIPIDEMIA: ICD-10-CM

## 2022-03-15 DIAGNOSIS — J44.9 CHRONIC OBSTRUCTIVE PULMONARY DISEASE, UNSPECIFIED COPD TYPE: ICD-10-CM

## 2022-03-15 DIAGNOSIS — R07.9 CHEST PAIN, UNSPECIFIED TYPE: ICD-10-CM

## 2022-03-15 DIAGNOSIS — R06.09 OTHER FORM OF DYSPNEA: ICD-10-CM

## 2022-03-15 DIAGNOSIS — I35.0 NONRHEUMATIC AORTIC VALVE STENOSIS: ICD-10-CM

## 2022-03-15 DIAGNOSIS — I50.32 CHRONIC HEART FAILURE WITH PRESERVED EJECTION FRACTION: Primary | ICD-10-CM

## 2022-03-15 PROCEDURE — 99214 OFFICE O/P EST MOD 30 MIN: CPT | Mod: S$PBB,,, | Performed by: INTERNAL MEDICINE

## 2022-03-15 PROCEDURE — 99999 PR PBB SHADOW E&M-EST. PATIENT-LVL V: ICD-10-PCS | Mod: PBBFAC,,, | Performed by: INTERNAL MEDICINE

## 2022-03-15 PROCEDURE — 99999 PR PBB SHADOW E&M-EST. PATIENT-LVL V: CPT | Mod: PBBFAC,,, | Performed by: INTERNAL MEDICINE

## 2022-03-15 PROCEDURE — 99214 PR OFFICE/OUTPT VISIT, EST, LEVL IV, 30-39 MIN: ICD-10-PCS | Mod: S$PBB,,, | Performed by: INTERNAL MEDICINE

## 2022-03-15 PROCEDURE — 99215 OFFICE O/P EST HI 40 MIN: CPT | Mod: PBBFAC | Performed by: INTERNAL MEDICINE

## 2022-03-15 NOTE — PROGRESS NOTES
Subjective:   Patient ID:  Navdeep Sesay Jr. is a 76 y.o. male who presents for cardiac consult of No chief complaint on file.      HPI  The patient came in today for cardiac consult of No chief complaint on file.      Navdeep Sesay Jr. is a 76 y.o. male pt with HFpEF, moderate AS, pulm HTN, HTN, HLD, COPD, anemia, GERD, PMR, RA, DM2 presents for follow up CV eval.     20  [facility] will have pulm eval as well. He had recent labwork, A1c was normal. He has been chronically SOB but worse lately.   He had 6 min walk was neg. 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 He is weaning off prednisone.   ECG - NSR with PVCs, inc RBBB, inferior TWI    21  He has not followed up since prior visit due to being told stress test normal. His nuclear stress with abnormal inferior ischemia. ECHO with grade 2 DD, pulm HTN.   ECG - NSR, inc RBBB, nonspec T wave ab    22  BP and HR stable today. Has not gotten R/LHC yet. 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 He is also anemic had received PRBCs, he is also getting B12 shots.   ECG - NSR, PACs, Inc RBB, interior TWI     3/15/22  He had low blood counts again and has gotten more blood and B12 shots. Still waiting on EGD/Cscope. He is taking iron tabs.     Patient feels no leg swelling, no PND,  no dizziness, no syncope, no CNS symptoms.    Patient has dec exercise tolerance.    Patient is compliant with medications.    FH - mother with CHF,  in 80s     Results for orders placed during the hospital encounter of 22    Echo    Interpretation Summary  · The left ventricle is normal in size with concentric remodeling and normal systolic function.  · The estimated ejection fraction is 55%.  · Grade I left ventricular diastolic dysfunction.  · Normal right ventricular size with normal right ventricular systolic function.  · There is moderate aortic  valve stenosis.  · Aortic valve area is 1.14 cm2; peak velocity is 3.18 m/s; mean gradient is 25 mmHg.  · Mild tricuspid regurgitation.  · Mild mitral regurgitation.  · Normal central venous pressure (3 mmHg).  · The estimated PA systolic pressure is 40 mmHg.      Results for orders placed during the hospital encounter of 08/20/20    Nuclear Stress - Cardiology Interpreted    Interpretation Summary    The study shows abnormal myocardial perfusion.    Abnormal myocardial perfusion study.    Perfusion Defect There is a small sized, reversible defect that is consistent with ischemia in the basal to mid inferior wall(s).    Gated perfusion images showed an ejection fraction of 80% at rest and 73% post stress.    The EKG portion of this study is negative for ischemia.    Arrhythmias during stress: occasional PVCs.      History reviewed. No pertinent past medical history.    History reviewed. No pertinent surgical history.    Social History     Tobacco Use    Smoking status: Never Smoker    Smokeless tobacco: Never Used   Substance Use Topics    Alcohol use: Never    Drug use: Never       History reviewed. No pertinent family history.    Patient's Medications   New Prescriptions    No medications on file   Previous Medications    ADALIMUMAB (HUMIRA) 10 MG/0.2 ML SYKT    Inject 10 mg into the skin every 14 (fourteen) days.    ALBUTEROL (ACCUNEB) 1.25 MG/3 ML NEBU    Take 1.25 mg by nebulization every 6 (six) hours as needed. Rescue    ALENDRONATE (FOSAMAX) 35 MG TABLET    Take 35 mg by mouth every 7 days.    ALOGLIPTIN (NESINA) 25 MG TAB    Take by mouth.    AMLODIPINE (NORVASC) 10 MG TABLET    Take 10 mg by mouth once daily.    ASPIRIN (ECOTRIN) 81 MG EC TABLET    Take 1 tablet (81 mg total) by mouth once daily.    ATORVASTATIN (LIPITOR) 80 MG TABLET    Take 80 mg by mouth once daily.    BUDESONIDE-FORMOTEROL 80-4.5 MCG (SYMBICORT) 80-4.5 MCG/ACTUATION HFAA    Inhale 2 puffs into the lungs. Controller    CALCIUM  CARBONATE 500 MG/5 ML (1,250 MG/5 ML)    Take by mouth once.    CALCIUM CARBONATE-VITAMIN D3 500MG (1,250MG) -600 UNIT TAB    Take by mouth once.    CARBOXYMETHYLCELLULOSE (REFRESH PLUS) 0.5 % DPET    1 drop 3 (three) times daily as needed.    CETIRIZINE (ZYRTEC) 10 MG TABLET    Take 10 mg by mouth once daily.    FOLIC ACID (FOLVITE) 1 MG TABLET    Take 1 mg by mouth once daily.    GUAIFENESIN (MUCINEX) 600 MG 12 HR TABLET    Take 800 mg by mouth 2 (two) times daily.    HYDROXYCHLOROQUINE (PLAQUENIL) 200 MG TABLET    Take by mouth once daily.    LOSARTAN (COZAAR) 100 MG TABLET    Take 100 mg by mouth once daily.    METHOTREXATE 2.5 MG TAB    Take by mouth every 7 days.    MONTELUKAST (SINGULAIR) 10 MG TABLET    Take 10 mg by mouth every evening.    OMEPRAZOLE (PRILOSEC) 20 MG CAPSULE    Take 20 mg by mouth once daily.    POLYVINYL ALCOHOL, ARTIFICIAL TEARS, (LIQUIFILM TEARS) 1.4 % OPHTHALMIC SOLUTION    1 drop as needed.    PREDNISONE (DELTASONE) 5 MG TABLET    Take 10 mg by mouth once daily.     TAMSULOSIN (FLOMAX) 0.4 MG CAP    Take 0.4 mg by mouth once daily.   Modified Medications    No medications on file   Discontinued Medications    No medications on file       Review of Systems   Constitutional: Positive for malaise/fatigue.   HENT: Negative.    Eyes: Negative.    Respiratory: Positive for shortness of breath.    Cardiovascular: Positive for chest pain.   Gastrointestinal: Negative.    Genitourinary: Negative.    Musculoskeletal: Negative.    Skin: Negative.    Neurological: Negative.    Endo/Heme/Allergies: Negative.    Psychiatric/Behavioral: Negative.    All 12 systems otherwise negative.      Wt Readings from Last 3 Encounters:   03/15/22 82.5 kg (181 lb 14.1 oz)   02/28/22 85.7 kg (189 lb 0.7 oz)   02/04/22 85.6 kg (188 lb 11.4 oz)     Temp Readings from Last 3 Encounters:   No data found for Temp     BP Readings from Last 3 Encounters:   03/15/22 110/62   02/28/22 (!) 154/76   02/04/22 126/86     Pulse  "Readings from Last 3 Encounters:   03/15/22 93   02/28/22 102   02/04/22 92       /62   Pulse 93   Ht 5' 11" (1.803 m)   Wt 82.5 kg (181 lb 14.1 oz)   SpO2 (!) 92%   BMI 25.37 kg/m²     Objective:   Physical Exam  Vitals and nursing note reviewed.   Constitutional:       General: He is not in acute distress.     Appearance: He is well-developed. He is not diaphoretic.   HENT:      Head: Normocephalic and atraumatic.      Nose: Nose normal.   Eyes:      General: No scleral icterus.     Conjunctiva/sclera: Conjunctivae normal.   Neck:      Thyroid: No thyromegaly.      Vascular: No JVD.   Cardiovascular:      Rate and Rhythm: Normal rate and regular rhythm.      Heart sounds: S1 normal and S2 normal. Murmur heard.     No friction rub. No gallop. No S3 or S4 sounds.   Pulmonary:      Effort: Pulmonary effort is normal. No respiratory distress.      Breath sounds: Normal breath sounds. No stridor. No wheezing or rales.   Chest:      Chest wall: No tenderness.   Abdominal:      General: Bowel sounds are normal. There is no distension.      Palpations: Abdomen is soft. There is no mass.      Tenderness: There is no abdominal tenderness. There is no rebound.   Genitourinary:     Comments: Deferred  Musculoskeletal:         General: No tenderness or deformity. Normal range of motion.      Cervical back: Normal range of motion and neck supple.   Lymphadenopathy:      Cervical: No cervical adenopathy.   Skin:     General: Skin is warm and dry.      Coloration: Skin is not pale.      Findings: No erythema or rash.   Neurological:      Mental Status: He is alert and oriented to person, place, and time.      Motor: No abnormal muscle tone.      Coordination: Coordination normal.   Psychiatric:         Behavior: Behavior normal.         Thought Content: Thought content normal.         Judgment: Judgment normal.         Lab Results   Component Value Date     07/20/2020    K 4.3 07/20/2020     07/20/2020    " CO2 27 07/20/2020    BUN 11 07/20/2020    CREATININE 1.1 07/20/2020     (H) 07/20/2020    HGBA1C 7.5 (H) 09/02/2021    MG 1.8 07/20/2020    WBC 9.14 12/15/2021    HGB 7.5 (L) 12/15/2021    HCT 27.3 (L) 12/15/2021     (H) 12/15/2021     12/15/2021    BNP 91 07/20/2020     Assessment:      1. Chronic heart failure with preserved ejection fraction    2. Pulmonary hypertension    3. Essential hypertension    4. Chronic obstructive pulmonary disease, unspecified COPD type    5. Other hyperlipidemia    6. Type 2 diabetes mellitus with other specified complication, unspecified whether long term insulin use    7. Chest pain, unspecified type    8. Other form of dyspnea    9. Nonrheumatic aortic valve stenosis        Plan:   1. Dyspnea with CP  - pharm nuclear stress test - abnormal - refer for R/LHC, deferred   - 2D ECHO with grade 2 DD, pulm HTN  - f/u with pulm eval    2. HTN with HFpEF, occ edema; moderate AS  - repeat ECHO in 2023  - elevate legs PRN   - cont meds and titrate    3. HLD  - cont statin    4. RA/DM2  - cont meds per PCP    5. COPD with pulm HTN  - cont tx per PCP/pulm  - PASP 40 mmHg    6. Pre-OP CV evaluation prior to EGD/Cscope for anemia workup  Moderate periop risk of CV events for moderate risk procedure.  No chest pain, active arrhythmia and CHF symptoms.  Ok to proceed to the scheduled surgery without further cardiac study.  OK to hold Aspirin 7 days before the procedure and resume ASAP postop.  Continue Statin periop.    Thank you for allowing me to participate in this patient's care. Please do not hesitate to contact me with any questions or concerns. Consult note has been forwarded to the referral physician.

## 2023-07-20 ENCOUNTER — OFFICE VISIT (OUTPATIENT)
Dept: CARDIOLOGY | Facility: CLINIC | Age: 78
End: 2023-07-20
Payer: OTHER GOVERNMENT

## 2023-07-20 ENCOUNTER — LAB VISIT (OUTPATIENT)
Dept: LAB | Facility: HOSPITAL | Age: 78
End: 2023-07-20
Attending: INTERNAL MEDICINE
Payer: OTHER GOVERNMENT

## 2023-07-20 VITALS
SYSTOLIC BLOOD PRESSURE: 106 MMHG | HEIGHT: 71 IN | OXYGEN SATURATION: 97 % | BODY MASS INDEX: 22.1 KG/M2 | HEART RATE: 112 BPM | WEIGHT: 157.88 LBS | DIASTOLIC BLOOD PRESSURE: 50 MMHG

## 2023-07-20 DIAGNOSIS — R53.82 CHRONIC FATIGUE: Primary | ICD-10-CM

## 2023-07-20 DIAGNOSIS — R53.82 CHRONIC FATIGUE: ICD-10-CM

## 2023-07-20 DIAGNOSIS — E78.49 OTHER HYPERLIPIDEMIA: ICD-10-CM

## 2023-07-20 DIAGNOSIS — I50.32 CHRONIC HEART FAILURE WITH PRESERVED EJECTION FRACTION: ICD-10-CM

## 2023-07-20 DIAGNOSIS — I10 ESSENTIAL HYPERTENSION: ICD-10-CM

## 2023-07-20 DIAGNOSIS — M06.9 RHEUMATOID ARTHRITIS, INVOLVING UNSPECIFIED SITE, UNSPECIFIED WHETHER RHEUMATOID FACTOR PRESENT: ICD-10-CM

## 2023-07-20 DIAGNOSIS — R94.39 ABNORMAL NUCLEAR STRESS TEST: ICD-10-CM

## 2023-07-20 DIAGNOSIS — I27.20 PULMONARY HYPERTENSION: ICD-10-CM

## 2023-07-20 DIAGNOSIS — E11.69 TYPE 2 DIABETES MELLITUS WITH OTHER SPECIFIED COMPLICATION, UNSPECIFIED WHETHER LONG TERM INSULIN USE: ICD-10-CM

## 2023-07-20 DIAGNOSIS — J42 CHRONIC BRONCHITIS, UNSPECIFIED CHRONIC BRONCHITIS TYPE: ICD-10-CM

## 2023-07-20 PROCEDURE — 99999 PR PBB SHADOW E&M-EST. PATIENT-LVL V: ICD-10-PCS | Mod: PBBFAC,,, | Performed by: INTERNAL MEDICINE

## 2023-07-20 PROCEDURE — 99214 PR OFFICE/OUTPT VISIT, EST, LEVL IV, 30-39 MIN: ICD-10-PCS | Mod: S$PBB,,, | Performed by: INTERNAL MEDICINE

## 2023-07-20 PROCEDURE — 99215 OFFICE O/P EST HI 40 MIN: CPT | Mod: PBBFAC | Performed by: INTERNAL MEDICINE

## 2023-07-20 PROCEDURE — 36415 COLL VENOUS BLD VENIPUNCTURE: CPT | Performed by: INTERNAL MEDICINE

## 2023-07-20 PROCEDURE — 99214 OFFICE O/P EST MOD 30 MIN: CPT | Mod: S$PBB,,, | Performed by: INTERNAL MEDICINE

## 2023-07-20 PROCEDURE — 83880 ASSAY OF NATRIURETIC PEPTIDE: CPT | Performed by: INTERNAL MEDICINE

## 2023-07-20 PROCEDURE — 99999 PR PBB SHADOW E&M-EST. PATIENT-LVL V: CPT | Mod: PBBFAC,,, | Performed by: INTERNAL MEDICINE

## 2023-07-20 RX ORDER — GUAIFENESIN, PSEUDOEPHEDRINE HYDROCHLORIDE 600; 60 MG/1; MG/1
1 TABLET, EXTENDED RELEASE ORAL
COMMUNITY
End: 2023-10-25

## 2023-07-20 RX ORDER — FLUTICASONE PROPIONATE AND SALMETEROL 250; 50 UG/1; UG/1
1 POWDER RESPIRATORY (INHALATION) 2 TIMES DAILY
COMMUNITY

## 2023-07-20 RX ORDER — TRAMADOL HYDROCHLORIDE 50 MG/1
10 TABLET ORAL EVERY 6 HOURS PRN
COMMUNITY
End: 2023-10-25

## 2023-07-20 NOTE — PROGRESS NOTES
Subjective:   Patient ID:  Navdeep Sesay Jr. is a 77 y.o. male who presents for evaluation of No chief complaint on file.      HPI  Patient of Dr. Carroll  77-year-old male, with extensive past medical history.  Of COPD.  Rheumatoid arthritis,, pneumonia, moderate AS.  Mild abnormal stress test.  And declined catheterization before  Anemia gets transfusions.    He states that for the last few weeks he has been having dyspnea on exertion and fatigue.  He was told by his hematologist this is not his anemia.  Though His hemoglobin ranges between 7 and 9.  Denies however any chest pain.      He is on 4 L oxygen a day he is in a wheelchair    No past medical history on file.    No past surgical history on file.    Social History     Tobacco Use    Smoking status: Never    Smokeless tobacco: Never   Substance Use Topics    Alcohol use: Never    Drug use: Never       No family history on file.    Review of Systems   Constitutional: Positive for malaise/fatigue.   Cardiovascular:  Positive for dyspnea on exertion. Negative for chest pain, palpitations and syncope.   Genitourinary: Negative.    Neurological: Negative.      Current Outpatient Medications on File Prior to Visit   Medication Sig    adalimumab (HUMIRA) 10 mg/0.2 mL SyKt Inject 10 mg into the skin every 14 (fourteen) days.    albuterol (ACCUNEB) 1.25 mg/3 mL Nebu Take 1.25 mg by nebulization every 6 (six) hours as needed. Rescue    alendronate (FOSAMAX) 35 MG tablet Take 35 mg by mouth every 7 days.    alogliptin (NESINA) 25 mg Tab Take by mouth.    aspirin (ECOTRIN) 81 MG EC tablet Take 1 tablet (81 mg total) by mouth once daily.    atorvastatin (LIPITOR) 80 MG tablet Take 80 mg by mouth once daily.    calcium carbonate 500 mg/5 mL (1,250 mg/5 mL) Take by mouth once.    calcium carbonate-vitamin D3 500mg (1,250mg) -600 unit Tab Take by mouth once.    fluticasone-salmeterol diskus inhaler 250-50 mcg Inhale 1 puff into the lungs 2 (two) times daily. Controller     folic acid (FOLVITE) 1 MG tablet Take 1 mg by mouth once daily.    hydroxychloroquine (PLAQUENIL) 200 mg tablet Take by mouth once daily.    methotrexate 2.5 MG Tab Take by mouth every 7 days.    montelukast (SINGULAIR) 10 mg tablet Take 10 mg by mouth every evening.    omeprazole (PRILOSEC) 20 MG capsule Take 20 mg by mouth once daily.    polyvinyl alcohol, artificial tears, (LIQUIFILM TEARS) 1.4 % ophthalmic solution 1 drop as needed.    predniSONE (DELTASONE) 5 MG tablet Take 10 mg by mouth once daily.     pseudoephedrine-guaiFENesin  mg (MUCINEX D)  mg per tablet Take 1 tablet by mouth every 12 (twelve) hours.    tamsulosin (FLOMAX) 0.4 mg Cap Take 0.4 mg by mouth once daily.    tiotropium-olodateroL (STIOLTO RESPIMAT) 2.5-2.5 mcg/actuation Mist Inhale into the lungs. Controller    traMADoL (ULTRAM) 50 mg tablet Take 10 mg by mouth every 6 (six) hours as needed for Pain.    amLODIPine (NORVASC) 10 MG tablet Take 10 mg by mouth once daily.    budesonide-formoterol 80-4.5 mcg (SYMBICORT) 80-4.5 mcg/actuation HFAA Inhale 2 puffs into the lungs. Controller    carboxymethylcellulose (REFRESH PLUS) 0.5 % Dpet 1 drop 3 (three) times daily as needed.    cetirizine (ZYRTEC) 10 MG tablet Take 10 mg by mouth once daily.    guaiFENesin (MUCINEX) 600 mg 12 hr tablet Take 800 mg by mouth 2 (two) times daily.    losartan (COZAAR) 100 MG tablet Take 100 mg by mouth once daily.     No current facility-administered medications on file prior to visit.       Objective:   Objective:  Wt Readings from Last 3 Encounters:   07/20/23 71.6 kg (157 lb 13.6 oz)   03/15/22 82.5 kg (181 lb 14.1 oz)   02/28/22 85.7 kg (189 lb 0.7 oz)     Temp Readings from Last 3 Encounters:   No data found for Temp     BP Readings from Last 3 Encounters:   07/20/23 (!) 106/50   03/15/22 110/62   02/28/22 (!) 154/76     Pulse Readings from Last 3 Encounters:   07/20/23 (!) 112   03/15/22 93   02/28/22 102       Physical Exam  Vitals reviewed.    Constitutional:       Appearance: He is well-developed.   Neck:      Vascular: No carotid bruit.   Cardiovascular:      Rate and Rhythm: Normal rate and regular rhythm.      Pulses: Intact distal pulses.      Heart sounds: Normal heart sounds. No murmur heard.  Pulmonary:      Breath sounds: Rhonchi present.   Neurological:      Mental Status: He is oriented to person, place, and time.       No results found for: CHOL  No results found for: HDL  No results found for: LDLCALC  No results found for: TRIG  No results found for: CHOLHDL    Chemistry        Component Value Date/Time     07/20/2020 1522    K 4.3 07/20/2020 1522     07/20/2020 1522    CO2 27 07/20/2020 1522    BUN 11 07/20/2020 1522    CREATININE 1.1 07/20/2020 1522     (H) 07/20/2020 1522        Component Value Date/Time    CALCIUM 9.2 07/20/2020 1522    ESTGFRAFRICA >60.0 07/20/2020 1522    EGFRNONAA >60.0 07/20/2020 1522          Lab Results   Component Value Date    TSH 0.840 09/02/2021     No results found for: INR, PROTIME  Lab Results   Component Value Date    WBC 9.14 12/15/2021    HGB 7.5 (L) 12/15/2021    HCT 27.3 (L) 12/15/2021     (H) 12/15/2021     12/15/2021     BNP  @LABRCNTIP(BNP,BNPTRIAGEBLO)@  CrCl cannot be calculated (Patient's most recent lab result is older than the maximum 7 days allowed.).     Imaging:  ======    No results found for this or any previous visit.    No results found for this or any previous visit.    Results for orders placed during the hospital encounter of 02/08/22    X-Ray Chest PA And Lateral    Narrative  EXAMINATION:  XR CHEST PA AND LATERAL    CLINICAL HISTORY:  Shortness of breath    TECHNIQUE:  PA and lateral views of the chest were performed.    COMPARISON:  None    FINDINGS:  Cardiac silhouette appears borderline enlarged.    Pulmonary vasculature appears mildly congested.  Probable scarring noted in the bilateral lung apices.  No definite parenchymal consolidation or  pleural effusion demonstrated.  No acute osseous findings demonstrated.    Impression  As above.      Electronically signed by: Raymundo Carver MD  Date:    02/08/2022  Time:    14:12    No results found for this or any previous visit.    No valid procedures specified.    No results found for this or any previous visit.      Results for orders placed during the hospital encounter of 08/20/20    Nuclear Stress - Cardiology Interpreted    Interpretation Summary    The study shows abnormal myocardial perfusion.    Abnormal myocardial perfusion study.    Perfusion Defect There is a small sized, reversible defect that is consistent with ischemia in the basal to mid inferior wall(s).    Gated perfusion images showed an ejection fraction of 80% at rest and 73% post stress.    The EKG portion of this study is negative for ischemia.    Arrhythmias during stress: occasional PVCs.      Results for orders placed during the hospital encounter of 01/11/22    Echo    Interpretation Summary  · The left ventricle is normal in size with concentric remodeling and normal systolic function.  · The estimated ejection fraction is 55%.  · Grade I left ventricular diastolic dysfunction.  · Normal right ventricular size with normal right ventricular systolic function.  · There is moderate aortic valve stenosis.  · Aortic valve area is 1.14 cm2; peak velocity is 3.18 m/s; mean gradient is 25 mmHg.  · Mild tricuspid regurgitation.  · Mild mitral regurgitation.  · Normal central venous pressure (3 mmHg).  · The estimated PA systolic pressure is 40 mmHg.      Diagnostic Results:  ECG: Reviewed    The ASCVD Risk score (Kaia DK, et al., 2019) failed to calculate for the following reasons:    Cannot find a previous HDL lab    Cannot find a previous total cholesterol lab        Assessment and Plan:   Chronic fatigue  -     B-TYPE NATRIURETIC PEPTIDE; Future; Expected date: 07/20/2023  -     Echo; Future    Essential hypertension    Chronic heart  failure with preserved ejection fraction    Other hyperlipidemia    Rheumatoid arthritis, involving unspecified site, unspecified whether rheumatoid factor present    Pulmonary hypertension    Type 2 diabetes mellitus with other specified complication, unspecified whether long term insulin use    Abnormal nuclear stress test    Chronic bronchitis, unspecified chronic bronchitis type      We will recheck his echo and get a BNP if elevated will prescribe him Lasix.    His fatigue is multifactorial secondary to his rheumatoid arthritis, COPD, chronic oxygen use, chronic respiratory failure anemia.    Agree with continuing medical treatment.   Reviewed all tests and above medical conditions with patient in detail and formulated treatment plan.  Risk factor modification discussed.   Cardiac low salt diet discussed.  Maintaining healthy weight and weight loss goals were discussed in clinic.    Follow up in  6 months with Dr Carroll

## 2023-07-21 LAB — BNP SERPL-MCNC: 693 PG/ML (ref 0–99)

## 2023-07-24 DIAGNOSIS — R79.89 ELEVATED BRAIN NATRIURETIC PEPTIDE (BNP) LEVEL: Primary | ICD-10-CM

## 2023-07-25 DIAGNOSIS — R79.89 ELEVATED BRAIN NATRIURETIC PEPTIDE (BNP) LEVEL: Primary | ICD-10-CM

## 2023-07-25 RX ORDER — FUROSEMIDE 40 MG/1
40 TABLET ORAL DAILY
Qty: 30 TABLET | Refills: 11 | Status: SHIPPED | OUTPATIENT
Start: 2023-07-25 | End: 2024-07-24

## 2023-08-10 ENCOUNTER — HOSPITAL ENCOUNTER (OUTPATIENT)
Dept: CARDIOLOGY | Facility: HOSPITAL | Age: 78
Discharge: HOME | End: 2023-08-10
Attending: INTERNAL MEDICINE
Payer: OTHER GOVERNMENT

## 2023-08-10 VITALS
WEIGHT: 157 LBS | HEIGHT: 71 IN | BODY MASS INDEX: 21.98 KG/M2 | HEART RATE: 110 BPM | DIASTOLIC BLOOD PRESSURE: 50 MMHG | SYSTOLIC BLOOD PRESSURE: 106 MMHG

## 2023-08-10 DIAGNOSIS — R53.82 CHRONIC FATIGUE: ICD-10-CM

## 2023-08-10 LAB
AORTIC ROOT ANNULUS: 2.4 CM
ASCENDING AORTA: 2.96 CM
AV INDEX (PROSTH): 0.27
AV MEAN GRADIENT: 36 MMHG
AV PEAK GRADIENT: 52 MMHG
AV VALVE AREA BY VELOCITY RATIO: 0.71 CM²
AV VALVE AREA: 0.83 CM²
AV VELOCITY RATIO: 0.23
BSA FOR ECHO PROCEDURE: 1.89 M2
CV ECHO LV RWT: 0.65 CM
DOP CALC AO PEAK VEL: 3.62 M/S
DOP CALC AO VTI: 68.1 CM
DOP CALC LVOT AREA: 3.1 CM2
DOP CALC LVOT DIAMETER: 1.98 CM
DOP CALC LVOT PEAK VEL: 0.84 M/S
DOP CALC LVOT STROKE VOLUME: 56.32 CM3
DOP CALC MV VTI: 44.1 CM
DOP CALC RVOT PEAK VEL: 0.89 M/S
DOP CALC RVOT VTI: 10.9 CM
DOP CALCLVOT PEAK VEL VTI: 18.3 CM
ECHO LV POSTERIOR WALL: 1.17 CM (ref 0.6–1.1)
FRACTIONAL SHORTENING: 25 % (ref 28–44)
INTERVENTRICULAR SEPTUM: 1.56 CM (ref 0.6–1.1)
IVRT: 101.33 MSEC
LA MAJOR: 4.3 CM
LA MINOR: 4.72 CM
LA WIDTH: 3.5 CM
LEFT ATRIUM SIZE: 3.38 CM
LEFT ATRIUM VOLUME INDEX MOD: 24.9 ML/M2
LEFT ATRIUM VOLUME INDEX: 23.8 ML/M2
LEFT ATRIUM VOLUME MOD: 47.37 CM3
LEFT ATRIUM VOLUME: 45.25 CM3
LEFT INTERNAL DIMENSION IN SYSTOLE: 2.69 CM (ref 2.1–4)
LEFT VENTRICLE DIASTOLIC VOLUME INDEX: 28.49 ML/M2
LEFT VENTRICLE DIASTOLIC VOLUME: 54.14 ML
LEFT VENTRICLE MASS INDEX: 91 G/M2
LEFT VENTRICLE SYSTOLIC VOLUME INDEX: 14.1 ML/M2
LEFT VENTRICLE SYSTOLIC VOLUME: 26.73 ML
LEFT VENTRICULAR INTERNAL DIMENSION IN DIASTOLE: 3.59 CM (ref 3.5–6)
LEFT VENTRICULAR MASS: 172.13 G
LVOT MG: 1.75 MMHG
LVOT MV: 0.63 CM/S
MV MEAN GRADIENT: 10 MMHG
MV PEAK GRADIENT: 20 MMHG
MV VALVE AREA BY CONTINUITY EQUATION: 1.28 CM2
PISA TR MAX VEL: 2.84 M/S
PV MEAN GRADIENT: 2 MMHG
PV PEAK GRADIENT: 6 MMHG
PV PEAK VELOCITY: 1.21 M/S
RA MAJOR: 4.43 CM
RA WIDTH: 3.2 CM
RIGHT VENTRICULAR END-DIASTOLIC DIMENSION: 3.02 CM
SINUS: 2.96 CM
STJ: 2.63 CM
TDI SEPTAL: 0.05 M/S
TR MAX PG: 32 MMHG
Z-SCORE OF LEFT VENTRICULAR DIMENSION IN END DIASTOLE: -3.96
Z-SCORE OF LEFT VENTRICULAR DIMENSION IN END SYSTOLE: -1.58

## 2023-08-10 PROCEDURE — 93306 ECHO (CUPID ONLY): ICD-10-PCS | Mod: 26,,, | Performed by: INTERNAL MEDICINE

## 2023-08-10 PROCEDURE — 93306 TTE W/DOPPLER COMPLETE: CPT

## 2023-08-10 PROCEDURE — 93306 TTE W/DOPPLER COMPLETE: CPT | Mod: 26,,, | Performed by: INTERNAL MEDICINE

## 2023-08-15 ENCOUNTER — TELEPHONE (OUTPATIENT)
Dept: CARDIOLOGY | Facility: CLINIC | Age: 78
End: 2023-08-15
Payer: OTHER GOVERNMENT

## 2023-08-15 NOTE — TELEPHONE ENCOUNTER
Contacted patient; Patient received and understood results with no questions or concerns.     ----- Message from Irma Abreu sent at 8/15/2023 11:05 AM CDT -----  Pt request a call back in regards to results [phone],Thanks

## 2023-10-25 ENCOUNTER — HOSPITAL ENCOUNTER (INPATIENT)
Facility: HOSPITAL | Age: 78
LOS: 4 days | Discharge: HOSPICE | DRG: 871 | End: 2023-10-29
Attending: EMERGENCY MEDICINE | Admitting: HOSPITALIST
Payer: MEDICARE

## 2023-10-25 DIAGNOSIS — J18.9 PNEUMONIA OF BOTH LOWER LOBES DUE TO INFECTIOUS ORGANISM: Primary | ICD-10-CM

## 2023-10-25 DIAGNOSIS — K59.00 CONSTIPATION: ICD-10-CM

## 2023-10-25 DIAGNOSIS — A41.9 SEPSIS: ICD-10-CM

## 2023-10-25 DIAGNOSIS — R07.9 CHEST PAIN: ICD-10-CM

## 2023-10-25 DIAGNOSIS — K56.41 FECAL IMPACTION: ICD-10-CM

## 2023-10-25 DIAGNOSIS — R91.8 MASS OF LOWER LOBE OF LEFT LUNG: ICD-10-CM

## 2023-10-25 DIAGNOSIS — R91.8 RIGHT LOWER LOBE LUNG MASS: ICD-10-CM

## 2023-10-25 DIAGNOSIS — R53.1 WEAKNESS: ICD-10-CM

## 2023-10-25 DIAGNOSIS — R06.02 SHORTNESS OF BREATH: ICD-10-CM

## 2023-10-25 DIAGNOSIS — K92.1 MELENA: ICD-10-CM

## 2023-10-25 LAB
ALBUMIN SERPL BCP-MCNC: 2.8 G/DL (ref 3.5–5.2)
ALP SERPL-CCNC: 58 U/L (ref 55–135)
ALT SERPL W/O P-5'-P-CCNC: 73 U/L (ref 10–44)
ANION GAP SERPL CALC-SCNC: 16 MMOL/L (ref 8–16)
AST SERPL-CCNC: 38 U/L (ref 10–40)
BASOPHILS # BLD AUTO: 0.03 K/UL (ref 0–0.2)
BASOPHILS NFR BLD: 0.2 % (ref 0–1.9)
BILIRUB SERPL-MCNC: 0.6 MG/DL (ref 0.1–1)
BILIRUB UR QL STRIP: NEGATIVE
BNP SERPL-MCNC: 423 PG/ML (ref 0–99)
BUN SERPL-MCNC: 33 MG/DL (ref 8–23)
CALCIUM SERPL-MCNC: 9.2 MG/DL (ref 8.7–10.5)
CHLORIDE SERPL-SCNC: 100 MMOL/L (ref 95–110)
CK SERPL-CCNC: 57 U/L (ref 20–200)
CLARITY UR: CLEAR
CO2 SERPL-SCNC: 23 MMOL/L (ref 23–29)
COLOR UR: YELLOW
CREAT SERPL-MCNC: 1.4 MG/DL (ref 0.5–1.4)
DIFFERENTIAL METHOD: ABNORMAL
EOSINOPHIL # BLD AUTO: 0 K/UL (ref 0–0.5)
EOSINOPHIL NFR BLD: 0 % (ref 0–8)
ERYTHROCYTE [DISTWIDTH] IN BLOOD BY AUTOMATED COUNT: 17.1 % (ref 11.5–14.5)
EST. GFR  (NO RACE VARIABLE): 51 ML/MIN/1.73 M^2
GLUCOSE SERPL-MCNC: 131 MG/DL (ref 70–110)
GLUCOSE UR QL STRIP: NEGATIVE
HCT VFR BLD AUTO: 27.3 % (ref 40–54)
HCV AB SERPL QL IA: NEGATIVE
HEP C VIRUS HOLD SPECIMEN: NORMAL
HGB BLD-MCNC: 8.6 G/DL (ref 14–18)
HGB UR QL STRIP: NEGATIVE
HIV 1+2 AB+HIV1 P24 AG SERPL QL IA: NEGATIVE
IMM GRANULOCYTES # BLD AUTO: 0.52 K/UL (ref 0–0.04)
IMM GRANULOCYTES NFR BLD AUTO: 3.3 % (ref 0–0.5)
KETONES UR QL STRIP: NEGATIVE
LACTATE SERPL-SCNC: 2.5 MMOL/L (ref 0.5–2.2)
LACTATE SERPL-SCNC: 6.6 MMOL/L (ref 0.5–2.2)
LACTATE SERPL-SCNC: 7.7 MMOL/L (ref 0.5–2.2)
LEUKOCYTE ESTERASE UR QL STRIP: NEGATIVE
LYMPHOCYTES # BLD AUTO: 0.9 K/UL (ref 1–4.8)
LYMPHOCYTES NFR BLD: 5.4 % (ref 18–48)
MCH RBC QN AUTO: 29.3 PG (ref 27–31)
MCHC RBC AUTO-ENTMCNC: 31.5 G/DL (ref 32–36)
MCV RBC AUTO: 93 FL (ref 82–98)
MONOCYTES # BLD AUTO: 0.8 K/UL (ref 0.3–1)
MONOCYTES NFR BLD: 5.1 % (ref 4–15)
NEUTROPHILS # BLD AUTO: 13.7 K/UL (ref 1.8–7.7)
NEUTROPHILS NFR BLD: 86 % (ref 38–73)
NITRITE UR QL STRIP: NEGATIVE
NRBC BLD-RTO: 0 /100 WBC
PH UR STRIP: 7 [PH] (ref 5–8)
PLATELET # BLD AUTO: 278 K/UL (ref 150–450)
PMV BLD AUTO: 9.1 FL (ref 9.2–12.9)
POTASSIUM SERPL-SCNC: 3.8 MMOL/L (ref 3.5–5.1)
PROT SERPL-MCNC: 6.6 G/DL (ref 6–8.4)
PROT UR QL STRIP: ABNORMAL
RBC # BLD AUTO: 2.94 M/UL (ref 4.6–6.2)
SODIUM SERPL-SCNC: 139 MMOL/L (ref 136–145)
SP GR UR STRIP: >1.03 (ref 1–1.03)
TROPONIN I SERPL DL<=0.01 NG/ML-MCNC: 0.08 NG/ML (ref 0–0.03)
TROPONIN I SERPL DL<=0.01 NG/ML-MCNC: 0.1 NG/ML (ref 0–0.03)
URN SPEC COLLECT METH UR: ABNORMAL
UROBILINOGEN UR STRIP-ACNC: NEGATIVE EU/DL
WBC # BLD AUTO: 15.94 K/UL (ref 3.9–12.7)

## 2023-10-25 PROCEDURE — 85025 COMPLETE CBC W/AUTO DIFF WBC: CPT | Performed by: EMERGENCY MEDICINE

## 2023-10-25 PROCEDURE — 99285 EMERGENCY DEPT VISIT HI MDM: CPT | Mod: 25

## 2023-10-25 PROCEDURE — 63600175 PHARM REV CODE 636 W HCPCS: Performed by: FAMILY MEDICINE

## 2023-10-25 PROCEDURE — 25000003 PHARM REV CODE 250: Performed by: EMERGENCY MEDICINE

## 2023-10-25 PROCEDURE — 21400001 HC TELEMETRY ROOM

## 2023-10-25 PROCEDURE — 25500020 PHARM REV CODE 255: Performed by: FAMILY MEDICINE

## 2023-10-25 PROCEDURE — 80053 COMPREHEN METABOLIC PANEL: CPT | Performed by: EMERGENCY MEDICINE

## 2023-10-25 PROCEDURE — 87040 BLOOD CULTURE FOR BACTERIA: CPT | Performed by: EMERGENCY MEDICINE

## 2023-10-25 PROCEDURE — 36415 COLL VENOUS BLD VENIPUNCTURE: CPT | Performed by: NURSE PRACTITIONER

## 2023-10-25 PROCEDURE — 93010 ELECTROCARDIOGRAM REPORT: CPT | Mod: ,,, | Performed by: INTERNAL MEDICINE

## 2023-10-25 PROCEDURE — 25000003 PHARM REV CODE 250: Performed by: NURSE PRACTITIONER

## 2023-10-25 PROCEDURE — 86803 HEPATITIS C AB TEST: CPT | Performed by: EMERGENCY MEDICINE

## 2023-10-25 PROCEDURE — 25000003 PHARM REV CODE 250: Performed by: FAMILY MEDICINE

## 2023-10-25 PROCEDURE — 83605 ASSAY OF LACTIC ACID: CPT | Performed by: EMERGENCY MEDICINE

## 2023-10-25 PROCEDURE — 93010 EKG 12-LEAD: ICD-10-PCS | Mod: ,,, | Performed by: INTERNAL MEDICINE

## 2023-10-25 PROCEDURE — 84484 ASSAY OF TROPONIN QUANT: CPT | Performed by: EMERGENCY MEDICINE

## 2023-10-25 PROCEDURE — 82550 ASSAY OF CK (CPK): CPT | Performed by: EMERGENCY MEDICINE

## 2023-10-25 PROCEDURE — 84484 ASSAY OF TROPONIN QUANT: CPT | Mod: 91 | Performed by: NURSE PRACTITIONER

## 2023-10-25 PROCEDURE — 93005 ELECTROCARDIOGRAM TRACING: CPT

## 2023-10-25 PROCEDURE — 81003 URINALYSIS AUTO W/O SCOPE: CPT | Performed by: EMERGENCY MEDICINE

## 2023-10-25 PROCEDURE — 87389 HIV-1 AG W/HIV-1&-2 AB AG IA: CPT | Performed by: EMERGENCY MEDICINE

## 2023-10-25 PROCEDURE — 83880 ASSAY OF NATRIURETIC PEPTIDE: CPT | Performed by: EMERGENCY MEDICINE

## 2023-10-25 PROCEDURE — 96360 HYDRATION IV INFUSION INIT: CPT

## 2023-10-25 PROCEDURE — 83605 ASSAY OF LACTIC ACID: CPT | Mod: 91 | Performed by: NURSE PRACTITIONER

## 2023-10-25 RX ORDER — POLYETHYLENE GLYCOL 3350 17 G/17G
17 POWDER, FOR SOLUTION ORAL DAILY PRN
Status: DISCONTINUED | OUTPATIENT
Start: 2023-10-25 | End: 2023-10-29 | Stop reason: HOSPADM

## 2023-10-25 RX ORDER — METHOTREXATE 2.5 MG/1
25 TABLET ORAL
COMMUNITY
Start: 2023-04-28

## 2023-10-25 RX ORDER — TALC
6 POWDER (GRAM) TOPICAL NIGHTLY PRN
Status: DISCONTINUED | OUTPATIENT
Start: 2023-10-25 | End: 2023-10-29 | Stop reason: HOSPADM

## 2023-10-25 RX ORDER — NALOXONE HCL 0.4 MG/ML
0.02 VIAL (ML) INJECTION
Status: DISCONTINUED | OUTPATIENT
Start: 2023-10-25 | End: 2023-10-29 | Stop reason: HOSPADM

## 2023-10-25 RX ORDER — SODIUM CHLORIDE 0.9 % (FLUSH) 0.9 %
3 SYRINGE (ML) INJECTION EVERY 12 HOURS PRN
Status: DISCONTINUED | OUTPATIENT
Start: 2023-10-25 | End: 2023-10-29 | Stop reason: HOSPADM

## 2023-10-25 RX ORDER — SYRING-NEEDL,DISP,INSUL,0.3 ML 29 G X1/2"
296 SYRINGE, EMPTY DISPOSABLE MISCELLANEOUS
Status: COMPLETED | OUTPATIENT
Start: 2023-10-25 | End: 2023-10-25

## 2023-10-25 RX ORDER — ONDANSETRON 2 MG/ML
4 INJECTION INTRAMUSCULAR; INTRAVENOUS EVERY 8 HOURS PRN
Status: DISCONTINUED | OUTPATIENT
Start: 2023-10-25 | End: 2023-10-29 | Stop reason: HOSPADM

## 2023-10-25 RX ORDER — MEGESTROL ACETATE 40 MG/ML
400 SUSPENSION ORAL DAILY
COMMUNITY

## 2023-10-25 RX ORDER — SIMETHICONE 80 MG
1 TABLET,CHEWABLE ORAL 4 TIMES DAILY PRN
Status: DISCONTINUED | OUTPATIENT
Start: 2023-10-25 | End: 2023-10-29 | Stop reason: HOSPADM

## 2023-10-25 RX ORDER — IPRATROPIUM BROMIDE AND ALBUTEROL SULFATE 2.5; .5 MG/3ML; MG/3ML
3 SOLUTION RESPIRATORY (INHALATION) EVERY 4 HOURS PRN
Status: DISCONTINUED | OUTPATIENT
Start: 2023-10-25 | End: 2023-10-29 | Stop reason: HOSPADM

## 2023-10-25 RX ORDER — MAG HYDROX/ALUMINUM HYD/SIMETH 200-200-20
30 SUSPENSION, ORAL (FINAL DOSE FORM) ORAL 4 TIMES DAILY PRN
Status: DISCONTINUED | OUTPATIENT
Start: 2023-10-25 | End: 2023-10-29 | Stop reason: HOSPADM

## 2023-10-25 RX ORDER — ASPIRIN 81 MG/1
81 TABLET ORAL DAILY
Status: ON HOLD | COMMUNITY
Start: 2023-03-25 | End: 2023-10-27

## 2023-10-25 RX ORDER — HYDROCODONE BITARTRATE AND ACETAMINOPHEN 5; 325 MG/1; MG/1
1 TABLET ORAL EVERY 6 HOURS PRN
Status: DISCONTINUED | OUTPATIENT
Start: 2023-10-25 | End: 2023-10-29 | Stop reason: HOSPADM

## 2023-10-25 RX ORDER — ACETAMINOPHEN 325 MG/1
650 TABLET ORAL EVERY 6 HOURS PRN
Status: DISCONTINUED | OUTPATIENT
Start: 2023-10-25 | End: 2023-10-29 | Stop reason: HOSPADM

## 2023-10-25 RX ORDER — GUAIFENESIN 600 MG/1
1200 TABLET, EXTENDED RELEASE ORAL EVERY 12 HOURS
COMMUNITY
Start: 2023-08-14

## 2023-10-25 RX ORDER — ACETAMINOPHEN 650 MG/1
650 SUPPOSITORY RECTAL EVERY 4 HOURS PRN
Status: DISCONTINUED | OUTPATIENT
Start: 2023-10-25 | End: 2023-10-29 | Stop reason: HOSPADM

## 2023-10-25 RX ORDER — ALOGLIPTIN 25 MG/1
1 TABLET, FILM COATED ORAL DAILY
COMMUNITY
Start: 2022-12-19

## 2023-10-25 RX ORDER — ATORVASTATIN CALCIUM 80 MG/1
40 TABLET, FILM COATED ORAL NIGHTLY
COMMUNITY
Start: 2023-01-11

## 2023-10-25 RX ORDER — SODIUM CHLORIDE 9 MG/ML
INJECTION, SOLUTION INTRAVENOUS CONTINUOUS
Status: DISCONTINUED | OUTPATIENT
Start: 2023-10-26 | End: 2023-10-29

## 2023-10-25 RX ORDER — PSEUDOEPHEDRINE/ACETAMINOPHEN 30MG-500MG
100 TABLET ORAL
Status: COMPLETED | OUTPATIENT
Start: 2023-10-25 | End: 2023-10-25

## 2023-10-25 RX ORDER — PROMETHAZINE HYDROCHLORIDE 25 MG/1
25 TABLET ORAL EVERY 6 HOURS PRN
Status: DISCONTINUED | OUTPATIENT
Start: 2023-10-25 | End: 2023-10-29 | Stop reason: HOSPADM

## 2023-10-25 RX ORDER — GLUCAGON 1 MG
1 KIT INJECTION
Status: DISCONTINUED | OUTPATIENT
Start: 2023-10-25 | End: 2023-10-29 | Stop reason: HOSPADM

## 2023-10-25 RX ORDER — MORPHINE SULFATE 4 MG/ML
2 INJECTION, SOLUTION INTRAMUSCULAR; INTRAVENOUS EVERY 4 HOURS PRN
Status: DISCONTINUED | OUTPATIENT
Start: 2023-10-25 | End: 2023-10-29 | Stop reason: HOSPADM

## 2023-10-25 RX ORDER — CALCIUM CARBONATE 500(1250)
1250 TABLET ORAL DAILY
COMMUNITY
Start: 2022-12-05

## 2023-10-25 RX ORDER — IBUPROFEN 200 MG
24 TABLET ORAL
Status: DISCONTINUED | OUTPATIENT
Start: 2023-10-25 | End: 2023-10-29 | Stop reason: HOSPADM

## 2023-10-25 RX ORDER — IBUPROFEN 200 MG
16 TABLET ORAL
Status: DISCONTINUED | OUTPATIENT
Start: 2023-10-25 | End: 2023-10-29 | Stop reason: HOSPADM

## 2023-10-25 RX ORDER — TRAMADOL HYDROCHLORIDE 50 MG/1
100 TABLET ORAL 2 TIMES DAILY PRN
Status: ON HOLD | COMMUNITY
Start: 2023-08-23 | End: 2023-10-29 | Stop reason: HOSPADM

## 2023-10-25 RX ADMIN — Medication 100 ML: at 07:10

## 2023-10-25 RX ADMIN — BE HEALTH MAGNESIUM CITRATE ORAL SOLUTION - LEMON 296 ML: 1.75 LIQUID ORAL at 07:10

## 2023-10-25 RX ADMIN — SODIUM CHLORIDE 1000 ML: 9 INJECTION, SOLUTION INTRAVENOUS at 04:10

## 2023-10-25 RX ADMIN — SODIUM CHLORIDE 500 ML: 0.9 INJECTION, SOLUTION INTRAVENOUS at 06:10

## 2023-10-25 RX ADMIN — SODIUM CHLORIDE: 9 INJECTION, SOLUTION INTRAVENOUS at 11:10

## 2023-10-25 RX ADMIN — SODIUM CHLORIDE 1000 ML: 9 INJECTION, SOLUTION INTRAVENOUS at 10:10

## 2023-10-25 RX ADMIN — IOHEXOL 100 ML: 350 INJECTION, SOLUTION INTRAVENOUS at 05:10

## 2023-10-25 RX ADMIN — PIPERACILLIN AND TAZOBACTAM 4.5 G: 4; .5 INJECTION, POWDER, LYOPHILIZED, FOR SOLUTION INTRAVENOUS; PARENTERAL at 07:10

## 2023-10-25 NOTE — PHARMACY MED REC
"Admission Medication History     The home medication history was taken by Romeo Arzate.    You may go to "Admission" then "Reconcile Home Medications" tabs to review and/or act upon these items.     The home medication list has been updated by the Pharmacy department.   Please read ALL comments highlighted in yellow.   Please address this information as you see fit.    Feel free to contact us if you have any questions or require assistance.      The medications listed below were removed from the home medication list. Please reorder if appropriate:  Patient reports no longer taking the following medication(s):  SYMBICORT 80-4.5MCG  STIOLTO 2.5MCG    Medications listed below were obtained from: Patient/family and Analytic software- [facility]: [facility]  (Not in a hospital admission)      Romeo Arzate  [id number]    Current Outpatient Medications on File Prior to Encounter   Medication Sig Dispense Refill Last Dose    alogliptin (NESINA) 25 mg Tab Take 1 tablet by mouth once daily.   10/24/2023    aspirin (ECOTRIN) 81 MG EC tablet Take 81 mg by mouth once daily.   10/24/2023    atorvastatin (LIPITOR) 80 MG tablet Take 40 mg by mouth every evening.   10/24/2023    calcium carbonate (OS-DANIA) 500 mg calcium (1,250 mg) tablet Take 1,250 mg by mouth once daily.   10/24/2023    cetirizine (ZYRTEC) 10 MG tablet Take 10 mg by mouth once daily.   10/24/2023    fluticasone-salmeterol diskus inhaler 250-50 mcg Inhale 1 puff into the lungs 2 (two) times daily. Controller   10/24/2023    folic acid (FOLVITE) 1 MG tablet Take 1 mg by mouth once daily.   10/24/2023    furosemide (LASIX) 40 MG tablet Take 1 tablet (40 mg total) by mouth once daily. 30 tablet 11 10/24/2023    guaiFENesin (MUCINEX) 600 mg 12 hr tablet Take 1,200 mg by mouth every 12 (twelve) hours.   10/24/2023    hydroxychloroquine (PLAQUENIL) 200 mg tablet Take by mouth once daily.   10/24/2023    megestroL (MEGACE) 400 mg/10 mL (40 mg/mL) Susp Take 400 mg by " mouth once daily.   10/24/2023    methotrexate 2.5 MG Tab Take 25 mg by mouth every 7 days. (Mondays)   10/23/2023    montelukast (SINGULAIR) 10 mg tablet Take 10 mg by mouth every evening.   10/24/2023    omeprazole (PRILOSEC) 20 MG capsule Take 20 mg by mouth once daily.   10/24/2023    predniSONE (DELTASONE) 5 MG tablet Take 5 mg by mouth once daily.   10/24/2023    tamsulosin (FLOMAX) 0.4 mg Cap Take 0.4 mg by mouth once daily.   10/24/2023    traMADoL (ULTRAM) 50 mg tablet Take 100 mg by mouth 2 (two) times daily as needed for Pain.   10/24/2023    adalimumab (HUMIRA) 10 mg/0.2 mL SyKt Inject 10 mg into the skin every 14 (fourteen) days.       albuterol (ACCUNEB) 1.25 mg/3 mL Nebu Take 1.25 mg by nebulization every 6 (six) hours as needed. Rescue       amLODIPine (NORVASC) 10 MG tablet Take 10 mg by mouth once daily.   NOT TAKING    losartan (COZAAR) 100 MG tablet Take 100 mg by mouth once daily.   NOT TAKING       .

## 2023-10-25 NOTE — ED PROVIDER NOTES
SCRIBE #1 NOTE: I, Chanda Ridley, am scribing for, and in the presence of, Kit Moser MD. I have scribed the HPI, ROS, and PEx.     SCRIBE #2 NOTE: I, Amy Hazel, am scribing for, and in the presence of,  Kaycee Rincon MD. I have scribed the remaining portions of the note not scribed by Scribe #1.     History      Chief Complaint   Patient presents with    Constipation     Pt states he has been having constipation x 3 days and also SOB. Pt is normally on 3 LPM at home and is currently o2 stat is 100 on room air       Review of patient's allergies indicates:  No Known Allergies     HPI   HPI    10/25/2023, 2:56 PM   History obtained from the patient and the pt's daughter      History of Present Illness: Navdeep Sesay Jr. is a 78 y.o. male patient with a PMHx of COPD, CHF, and anemia who presents to the Emergency Department for constipation which onset 3 days ago. Pt reports that he has been using Murelax and other medications for constipation, but his last bowel movement was 3 days ago. He states that he has been having to remove stool from his rectum digitally for the past 3 days. Symptoms are constant and moderate in severity. No exacerbating factors reported. No associated sxs reported.     Pt's daughter also reports that the pt is on home O2, but his O2 saturations have been dropping into the 70s when he exerts himself while wearing O2. The pt's O2 saturations return to normal when he rests, per the daughter. She also reports that the pt receives blood transfusions regularly and last had a blood transfusion 2 weeks ago. Patient denies any fever, chills, CP, diaphoresis, numbness, and all other sxs at this time. No prior Tx reported. No further complaints or concerns at this time.         Arrival mode: EMS    PCP: [facility]       Past Medical History:  Past Medical History:   Diagnosis Date    Aortic stenosis     Chronic anemia     COPD (chronic obstructive pulmonary disease)      Hypertension     Rheumatoid arthritis        Past Surgical History:  History reviewed. No pertinent surgical history.      Family History:  Family History   Problem Relation Age of Onset    No Known Problems Mother     No Known Problems Father        Social History:  Social History     Tobacco Use    Smoking status: Never    Smokeless tobacco: Never   Substance and Sexual Activity    Alcohol use: Never    Drug use: Never    Sexual activity: Not Currently       ROS   Review of Systems   Constitutional:  Negative for chills, diaphoresis and fever.   HENT:  Negative for sore throat.    Respiratory:  Positive for shortness of breath.    Cardiovascular:  Negative for chest pain.   Gastrointestinal:  Positive for constipation. Negative for nausea.   Genitourinary:  Negative for dysuria.   Musculoskeletal:  Negative for back pain.   Skin:  Negative for rash.   Neurological:  Negative for numbness.   Hematological:  Does not bruise/bleed easily.   All other systems reviewed and are negative.      Physical Exam      Initial Vitals [10/25/23 1421]   BP Pulse Resp Temp SpO2   113/64 (!) 116 16 98.3 °F (36.8 °C) 100 %      MAP       --          Physical Exam  Nursing Notes and Vital Signs Reviewed.  Constitutional: Patient is in no acute distress. Elderly. Frail.  Head: Atraumatic. Normocephalic.  Eyes: PERRL. EOM intact. Conjunctivae are not pale. No scleral icterus.  ENT: Mucous membranes are moist. Oropharynx is clear and symmetric.    Neck: Supple. Full ROM. No lymphadenopathy.  Cardiovascular: Regular rate. Regular rhythm. No murmurs, rubs, or gallops. Distal pulses are 2+ and symmetric.  Pulmonary/Chest: No respiratory distress. Clear to auscultation bilaterally. No wheezing or rales.  Abdominal: Soft and non-distended.  There is no tenderness.  No rebound, guarding, or rigidity.   Musculoskeletal: Moves all extremities. No obvious deformities. No edema.  Skin: Pale.  Neurological:  Alert, awake, and appropriate.   Normal speech.  No acute focal neurological deficits are appreciated.  Psychiatric: Normal affect. Good eye contact. Appropriate in content.    ED Course    Critical Care    Date/Time: 10/25/2023 6:26 PM    Performed by: Kaycee Rincon MD  Authorized by: Kaycee Rincon MD  Direct patient critical care time: 16 minutes  Additional history critical care time: 2 minutes  Ordering / reviewing critical care time: 5 minutes  Documentation critical care time: 7 minutes  Consulting other physicians critical care time: 5 minutes  Other critical care time: 10 minutes  Total critical care time (exclusive of procedural time) : 45 minutes  Critical care time was exclusive of separately billable procedures and treating other patients and teaching time.  Critical care was necessary to treat or prevent imminent or life-threatening deterioration of the following conditions: sepsis and pneumonia.  Critical care was time spent personally by me on the following activities: blood draw for specimens, discussions with consultants, interpretation of cardiac output measurements, evaluation of patient's response to treatment, examination of patient, obtaining history from patient or surrogate, ordering and performing treatments and interventions, ordering and review of laboratory studies, ordering and review of radiographic studies, pulse oximetry, re-evaluation of patient's condition, review of old charts and development of treatment plan with patient or surrogate.        ED Vital Signs:  Vitals:    10/28/23 0601 10/28/23 0701 10/28/23 0703 10/28/23 0716   BP: 123/62 (!) 118/57 (!) 118/57 (!) 121/58   Pulse: 93 92  90   Resp: (!) 21 17  17   Temp:  97.8 °F (36.6 °C)     TempSrc:  Temporal     SpO2: 100% 100%  100%   Weight:       Height:        10/28/23 0731 10/28/23 0739 10/28/23 0746 10/28/23 0801   BP: (!) 118/59  (!) 107/53 (!) 107/59   Pulse: 89 89 96 93   Resp: 19 16 18 19   Temp:       TempSrc:       SpO2: 100% 100% 100%  100%   Weight:       Height:        10/28/23 0816 10/28/23 0831 10/28/23 0846 10/28/23 0901   BP: 126/60 118/62 (!) 121/58 (!) 119/58   Pulse: 90 89 95 92   Resp: 20 19 (!) 24 (!) 26   Temp:       TempSrc:       SpO2: 100% 100% 100% 100%   Weight:       Height:        10/28/23 0916 10/28/23 0931 10/28/23 0946   BP: 117/61 (!) 115/59 (!) 111/57   Pulse: 95 92 94   Resp: (!) 23 19 (!) 25   Temp:      TempSrc:      SpO2: 100% 100% 100%   Weight:      Height:          Abnormal Lab Results:  Labs Reviewed   CBC W/ AUTO DIFFERENTIAL - Abnormal; Notable for the following components:       Result Value    WBC 15.94 (*)     RBC 2.94 (*)     Hemoglobin 8.6 (*)     Hematocrit 27.3 (*)     MCHC 31.5 (*)     RDW 17.1 (*)     MPV 9.1 (*)     Immature Granulocytes 3.3 (*)     Gran # (ANC) 13.7 (*)     Immature Grans (Abs) 0.52 (*)     Lymph # 0.9 (*)     Gran % 86.0 (*)     Lymph % 5.4 (*)     All other components within normal limits   COMPREHENSIVE METABOLIC PANEL - Abnormal; Notable for the following components:    Glucose 131 (*)     BUN 33 (*)     Albumin 2.8 (*)     ALT 73 (*)     eGFR 51 (*)     All other components within normal limits   URINALYSIS, REFLEX TO URINE CULTURE - Abnormal; Notable for the following components:    Specific Gravity, UA >1.030 (*)     Protein, UA Trace (*)     All other components within normal limits    Narrative:     Specimen Source->Urine   B-TYPE NATRIURETIC PEPTIDE - Abnormal; Notable for the following components:     (*)     All other components within normal limits   TROPONIN I - Abnormal; Notable for the following components:    Troponin I 0.080 (*)     All other components within normal limits   LACTIC ACID, PLASMA - Abnormal; Notable for the following components:    Lactate (Lactic Acid) 2.5 (*)     All other components within normal limits   HIV 1 / 2 ANTIBODY    Narrative:     Release to patient->Immediate   HEPATITIS C ANTIBODY    Narrative:     Release to patient->Immediate    HEP C VIRUS HOLD SPECIMEN    Narrative:     Release to patient->Immediate   CK        All Lab Results:  Results for orders placed or performed during the hospital encounter of 10/25/23   Blood culture    Specimen: Peripheral, Antecubital, Left; Blood   Result Value Ref Range    Blood Culture, Routine No Growth to date     Blood Culture, Routine No Growth to date     Blood Culture, Routine No Growth to date    Blood culture    Specimen: Peripheral, Hand, Left; Blood   Result Value Ref Range    Blood Culture, Routine No Growth to date     Blood Culture, Routine No Growth to date     Blood Culture, Routine No Growth to date    HIV 1/2 Ag/Ab (4th Gen)   Result Value Ref Range    HIV 1/2 Ag/Ab Negative Negative   Hepatitis C Antibody   Result Value Ref Range    Hepatitis C Ab Negative Negative   HCV Virus Hold Specimen   Result Value Ref Range    HEP C Virus Hold Specimen Hold for HCV sendout    CBC Auto Differential   Result Value Ref Range    WBC 15.94 (H) 3.90 - 12.70 K/uL    RBC 2.94 (L) 4.60 - 6.20 M/uL    Hemoglobin 8.6 (L) 14.0 - 18.0 g/dL    Hematocrit 27.3 (L) 40.0 - 54.0 %    MCV 93 82 - 98 fL    MCH 29.3 27.0 - 31.0 pg    MCHC 31.5 (L) 32.0 - 36.0 g/dL    RDW 17.1 (H) 11.5 - 14.5 %    Platelets 278 150 - 450 K/uL    MPV 9.1 (L) 9.2 - 12.9 fL    Immature Granulocytes 3.3 (H) 0.0 - 0.5 %    Gran # (ANC) 13.7 (H) 1.8 - 7.7 K/uL    Immature Grans (Abs) 0.52 (H) 0.00 - 0.04 K/uL    Lymph # 0.9 (L) 1.0 - 4.8 K/uL    Mono # 0.8 0.3 - 1.0 K/uL    Eos # 0.0 0.0 - 0.5 K/uL    Baso # 0.03 0.00 - 0.20 K/uL    nRBC 0 0 /100 WBC    Gran % 86.0 (H) 38.0 - 73.0 %    Lymph % 5.4 (L) 18.0 - 48.0 %    Mono % 5.1 4.0 - 15.0 %    Eosinophil % 0.0 0.0 - 8.0 %    Basophil % 0.2 0.0 - 1.9 %    Differential Method Automated    Comprehensive Metabolic Panel   Result Value Ref Range    Sodium 139 136 - 145 mmol/L    Potassium 3.8 3.5 - 5.1 mmol/L    Chloride 100 95 - 110 mmol/L    CO2 23 23 - 29 mmol/L    Glucose 131 (H) 70 - 110  mg/dL    BUN 33 (H) 8 - 23 mg/dL    Creatinine 1.4 0.5 - 1.4 mg/dL    Calcium 9.2 8.7 - 10.5 mg/dL    Total Protein 6.6 6.0 - 8.4 g/dL    Albumin 2.8 (L) 3.5 - 5.2 g/dL    Total Bilirubin 0.6 0.1 - 1.0 mg/dL    Alkaline Phosphatase 58 55 - 135 U/L    AST 38 10 - 40 U/L    ALT 73 (H) 10 - 44 U/L    eGFR 51 (A) >60 mL/min/1.73 m^2    Anion Gap 16 8 - 16 mmol/L   Urinalysis, Reflex to Urine Culture Urine, Clean Catch    Specimen: Urine   Result Value Ref Range    Specimen UA Urine, Clean Catch     Color, UA Yellow Yellow, Straw, Mary    Appearance, UA Clear Clear    pH, UA 7.0 5.0 - 8.0    Specific Gravity, UA >1.030 (A) 1.005 - 1.030    Protein, UA Trace (A) Negative    Glucose, UA Negative Negative    Ketones, UA Negative Negative    Bilirubin (UA) Negative Negative    Occult Blood UA Negative Negative    Nitrite, UA Negative Negative    Urobilinogen, UA Negative <2.0 EU/dL    Leukocytes, UA Negative Negative   BNP   Result Value Ref Range     (H) 0 - 99 pg/mL   CK   Result Value Ref Range    CPK 57 20 - 200 U/L   Troponin I   Result Value Ref Range    Troponin I 0.080 (H) 0.000 - 0.026 ng/mL   Lactic acid, plasma   Result Value Ref Range    Lactate (Lactic Acid) 2.5 (H) 0.5 - 2.2 mmol/L   Lactic acid, plasma   Result Value Ref Range    Lactate (Lactic Acid) 7.7 (HH) 0.5 - 2.2 mmol/L   Troponin I   Result Value Ref Range    Troponin I 0.099 (H) 0.000 - 0.026 ng/mL   Lactic acid, plasma   Result Value Ref Range    Lactate (Lactic Acid) 6.6 (HH) 0.5 - 2.2 mmol/L   CBC auto differential   Result Value Ref Range    WBC 15.82 (H) 3.90 - 12.70 K/uL    RBC 1.68 (L) 4.60 - 6.20 M/uL    Hemoglobin 5.0 (LL) 14.0 - 18.0 g/dL    Hematocrit 16.0 (LL) 40.0 - 54.0 %    MCV 95 82 - 98 fL    MCH 29.8 27.0 - 31.0 pg    MCHC 31.3 (L) 32.0 - 36.0 g/dL    RDW 17.4 (H) 11.5 - 14.5 %    Platelets 283 150 - 450 K/uL    MPV 9.7 9.2 - 12.9 fL    Immature Granulocytes 4.7 (H) 0.0 - 0.5 %    Gran # (ANC) 12.3 (H) 1.8 - 7.7 K/uL     Immature Grans (Abs) 0.74 (H) 0.00 - 0.04 K/uL    Lymph # 1.6 1.0 - 4.8 K/uL    Mono # 1.2 (H) 0.3 - 1.0 K/uL    Eos # 0.0 0.0 - 0.5 K/uL    Baso # 0.03 0.00 - 0.20 K/uL    nRBC 0 0 /100 WBC    Gran % 77.9 (H) 38.0 - 73.0 %    Lymph % 9.9 (L) 18.0 - 48.0 %    Mono % 7.3 4.0 - 15.0 %    Eosinophil % 0.0 0.0 - 8.0 %    Basophil % 0.2 0.0 - 1.9 %    Poik Slight     Ovalocytes Occasional     Tear Drop Cells Occasional     Differential Method Automated    Lactic acid, plasma   Result Value Ref Range    Lactate (Lactic Acid) 5.9 (HH) 0.5 - 2.2 mmol/L   Troponin I   Result Value Ref Range    Troponin I 0.129 (H) 0.000 - 0.026 ng/mL   Protime-INR   Result Value Ref Range    Prothrombin Time 14.6 (H) 9.0 - 12.5 sec    INR 1.4 (H) 0.8 - 1.2   Procalcitonin   Result Value Ref Range    Procalcitonin 0.29 (H) <0.25 ng/mL   Comprehensive metabolic panel   Result Value Ref Range    Sodium 141 136 - 145 mmol/L    Potassium 4.3 3.5 - 5.1 mmol/L    Chloride 107 95 - 110 mmol/L    CO2 18 (L) 23 - 29 mmol/L    Glucose 132 (H) 70 - 110 mg/dL    BUN 35 (H) 8 - 23 mg/dL    Creatinine 1.1 0.5 - 1.4 mg/dL    Calcium 7.6 (L) 8.7 - 10.5 mg/dL    Total Protein 4.7 (L) 6.0 - 8.4 g/dL    Albumin 2.1 (L) 3.5 - 5.2 g/dL    Total Bilirubin 0.5 0.1 - 1.0 mg/dL    Alkaline Phosphatase 46 (L) 55 - 135 U/L    AST 58 (H) 10 - 40 U/L    ALT 78 (H) 10 - 44 U/L    eGFR >60 >60 mL/min/1.73 m^2    Anion Gap 16 8 - 16 mmol/L   Hemoglobin   Result Value Ref Range    Hemoglobin 8.3 (L) 14.0 - 18.0 g/dL   Hematocrit   Result Value Ref Range    Hematocrit 24.8 (L) 40.0 - 54.0 %   Hemoglobin   Result Value Ref Range    Hemoglobin 6.9 (L) 14.0 - 18.0 g/dL   Hematocrit   Result Value Ref Range    Hematocrit 21.3 (L) 40.0 - 54.0 %   Lactic acid, plasma   Result Value Ref Range    Lactate (Lactic Acid) 2.5 (H) 0.5 - 2.2 mmol/L   Hemoglobin   Result Value Ref Range    Hemoglobin 8.9 (L) 14.0 - 18.0 g/dL   Hematocrit   Result Value Ref Range    Hematocrit 26.7 (L) 40.0  - 54.0 %   CBC Auto Differential   Result Value Ref Range    WBC 18.27 (H) 3.90 - 12.70 K/uL    RBC 2.94 (L) 4.60 - 6.20 M/uL    Hemoglobin 8.6 (L) 14.0 - 18.0 g/dL    Hematocrit 25.5 (L) 40.0 - 54.0 %    MCV 87 82 - 98 fL    MCH 29.3 27.0 - 31.0 pg    MCHC 33.7 32.0 - 36.0 g/dL    RDW 15.9 (H) 11.5 - 14.5 %    Platelets 150 150 - 450 K/uL    MPV 10.0 9.2 - 12.9 fL    Immature Granulocytes CANCELED 0.0 - 0.5 %    Immature Grans (Abs) CANCELED 0.00 - 0.04 K/uL    nRBC 3 (A) 0 /100 WBC    Gran % 91.0 (H) 38.0 - 73.0 %    Lymph % 7.0 (L) 18.0 - 48.0 %    Mono % 1.0 (L) 4.0 - 15.0 %    Eosinophil % 0.0 0.0 - 8.0 %    Basophil % 0.0 0.0 - 1.9 %    Metamyelocytes 1.0 %    Ovalocytes Occasional     Differential Method Manual    Comprehensive metabolic panel   Result Value Ref Range    Sodium 140 136 - 145 mmol/L    Potassium 3.4 (L) 3.5 - 5.1 mmol/L    Chloride 111 (H) 95 - 110 mmol/L    CO2 18 (L) 23 - 29 mmol/L    Glucose 151 (H) 70 - 110 mg/dL    BUN 37 (H) 8 - 23 mg/dL    Creatinine 1.2 0.5 - 1.4 mg/dL    Calcium 7.4 (L) 8.7 - 10.5 mg/dL    Total Protein 4.5 (L) 6.0 - 8.4 g/dL    Albumin 2.2 (L) 3.5 - 5.2 g/dL    Total Bilirubin 0.4 0.1 - 1.0 mg/dL    Alkaline Phosphatase 43 (L) 55 - 135 U/L    AST 99 (H) 10 - 40 U/L     (H) 10 - 44 U/L    eGFR >60 >60 mL/min/1.73 m^2    Anion Gap 11 8 - 16 mmol/L   Type & Screen   Result Value Ref Range    Group & Rh O POS     Indirect Alyssa NEG     Specimen Outdate 10/29/2023 23:59    ISTAT PROCEDURE   Result Value Ref Range    POC PH 7.550 (H) 7.35 - 7.45    POC PCO2 20.4 (LL) 35 - 45 mmHg    POC PO2 120 (H) 80 - 100 mmHg    POC HCO3 17.8 (L) 24 - 28 mmol/L    POC BE -5 (L) -2 to 2 mmol/L    POC SATURATED O2 99 95 - 100 %    Sample ARTERIAL     Site RR     Allens Test Pass     DelSys Nasal Can     Mode SPONT     Flow 4    Prepare RBC 2 Units; gib   Result Value Ref Range    UNIT NUMBER [id number]     Product Code Q0722G09     DISPENSE STATUS TRANSFUSED     CODING SYSTEM  [id number]     Unit Blood Type Code 5100     Unit Blood Type O POS     Unit Expiration [id number]     CROSSMATCH INTERPRETATION Compatible     UNIT NUMBER [id number]     Product Code X5735R85     DISPENSE STATUS TRANSFUSED     CODING SYSTEM [id number]     Unit Blood Type Code 5100     Unit Blood Type O POS     Unit Expiration [id number]     CROSSMATCH INTERPRETATION Compatible    Prepare RBC 2 Units; hb<7   Result Value Ref Range    UNIT NUMBER [id number]     Product Code L5972J32     DISPENSE STATUS TRANSFUSED     CODING SYSTEM [id number]     Unit Blood Type Code 5100     Unit Blood Type O POS     Unit Expiration [id number]     CROSSMATCH INTERPRETATION Compatible     UNIT NUMBER [id number]     Product Code Y8528G80     DISPENSE STATUS TRANSFUSED     CODING SYSTEM [id number]     Unit Blood Type Code 5100     Unit Blood Type O POS     Unit Expiration [id number]     CROSSMATCH INTERPRETATION Compatible          Imaging Results:  Imaging Results              CTA Chest Non-Coronary (PE Studies) (Final result)  Result time 10/25/23 18:04:49      Final result by Bentley Pierce MD (10/25/23 18:04:49)                   Impression:      No pulmonary embolism.  No dissection.    Spiculated nodules in the right lower lobe measures 7.2 mm.  Necrotic irregular mass in the left lower lobe measuring 4.2 x 5.1 cm.  Findings are worrisome for malignancy.  Consider biopsy or PET-CT for further evaluation.    Extensive emphysema.  Recommend annual follow-up with low radiation chest CT    Coronary artery calcification    All CT scans   are performed using dose optimization techniques including the following: automated exposure control; adjustment of the mA and/or kV; use of iterative reconstruction technique.  Dose modulation was employed for ALARA by means of: Automated exposure control; adjustment of the mA and/or kV according to patient size (this includes techniques or standardized protocols for targeted exams where dose is  matched to indication/reason for exam; i.e. extremities or head); and/or use of iterative reconstructive technique.      Electronically signed by: Bentley Stan  Date:    10/25/2023  Time:    18:04               Narrative:    EXAMINATION:  CTA CHEST NON CORONARY (PE STUDIES)    CLINICAL HISTORY:  PE suspected, intermediate prob, neg D-dimer;    TECHNIQUE:  Low dose axial images, sagittal and coronal reformations were obtained from the thoracic inlet to the lung bases following the IV administration of 100 mL of Omnipaque 350.  Contrast timing was optimized to evaluate the pulmonary arteries.  MIP images were performed.    COMPARISON:  None    FINDINGS:  No evidence for pulmonary embolism.  No evidence for aortic dissection or aneurysm.  Cardiovascular structures have a normal non gated appearance.    Spiculated nodules in the right lower lobe measures 7.2 mm. Necrotic irregular mass in the left lower lobe measuring 4.2 x 5.1 cm    Extensive emphysema.  Recommend annual follow-up with low radiation chest CT.  Mild basilar atelectasis or scarring.    Coronary artery calcification.  Atherosclerotic changes of the aorta.    No acute osseous injury                                       X-Ray Abdomen Flat And Erect (Final result)  Result time 10/25/23 15:42:51      Final result by Ephraim Quinonez MD (10/25/23 15:42:51)                   Impression:      Large volume stool throughout the colon.    6.2 cm transverse diameter developing rectal fecal impaction.      Electronically signed by: Ephraim Quinonez  Date:    10/25/2023  Time:    15:42               Narrative:    EXAMINATION:  XR ABDOMEN FLAT AND ERECT    CLINICAL HISTORY:  Constipation, unspecified    TECHNIQUE:  Flat and erect AP views of the abdomen were performed.    COMPARISON:  None    FINDINGS:  Large volume stool throughout the colon.  6.2 cm transverse diameter developing rectal fecal impaction.  No free air.  No evidence of obstruction or ileus.  No radiopaque  foreign body, or abnormal calcification.  Osseous structures unremarkable.  Advanced atherosclerotic calcification.                                       X-Ray Chest AP Portable (Final result)  Result time 10/25/23 15:51:27      Final result by Ephraim Quinonez MD (10/25/23 15:51:27)                   Impression:      No acute abnormality.      Electronically signed by: Ephraim Quinonez  Date:    10/25/2023  Time:    15:51               Narrative:    EXAMINATION:  XR CHEST AP PORTABLE    CLINICAL HISTORY:  weakness;.    TECHNIQUE:  Single frontal portable view of the chest was performed.    COMPARISON:  02/08/2022    FINDINGS:  Support devices: None    The lungs are clear, with normal appearance of pulmonary vasculature and no pleural effusion or pneumothorax.    The cardiac silhouette is normal in size. The hilar and mediastinal contours are unremarkable.    Bones are intact.                                     The EKG was ordered, reviewed, and independently interpreted by the ED provider.  Interpretation time: 15:31  Rate: 115 BPM  Rhythm: sinus tachycardia with premature atrial complexes  Interpretation: RBBB. No STEMI.           The Emergency Provider reviewed the vital signs and test results, which are outlined above.    ED Discussion     4:00 PM: Dr. Moser transfers care of patient to Dr. Rincon pending lab and radiology results.    6:20 PM: Discussed case with Dr. Anguiano (Hospital Medicine). Dr. Maldonado agrees with current care and management of pt and accepts admission.   Admitting Service: Hospital Medicine  Admitting Physician: Dr. Maldonado  Admit to: inpatient tele     6:21 PM: Re-evaluated pt. I have discussed test results, shared treatment plan, and the need for admission with patient and family at bedside. Pt and family express understanding at this time and agree with all information. All questions answered. Pt and family have no further questions or concerns at this time. Pt is ready for admit.              ED Medication(s):  Medications   sodium chloride 0.9% flush 3 mL (has no administration in time range)   albuterol-ipratropium 2.5 mg-0.5 mg/3 mL nebulizer solution 3 mL (0 mLs Nebulization Return to Cabinet 10/27/23 1618)   melatonin tablet 6 mg (6 mg Oral Given 10/27/23 2225)   ondansetron injection 4 mg (has no administration in time range)   promethazine tablet 25 mg (has no administration in time range)   polyethylene glycol packet 17 g (has no administration in time range)   acetaminophen tablet 650 mg (650 mg Oral Given 10/27/23 1242)   simethicone chewable tablet 80 mg (has no administration in time range)   aluminum-magnesium hydroxide-simethicone 200-200-20 mg/5 mL suspension 30 mL (has no administration in time range)   acetaminophen suppository 650 mg (has no administration in time range)   HYDROcodone-acetaminophen 5-325 mg per tablet 1 tablet (1 tablet Oral Given 10/27/23 2107)   morphine injection 2 mg (has no administration in time range)   naloxone 0.4 mg/mL injection 0.02 mg (has no administration in time range)   glucose chewable tablet 16 g (has no administration in time range)   glucose chewable tablet 24 g (has no administration in time range)   glucagon (human recombinant) injection 1 mg (has no administration in time range)   0.9%  NaCl infusion ( Intravenous Rate/Dose Change 10/28/23 1025)   dextrose 10% bolus 125 mL 125 mL (has no administration in time range)   dextrose 10% bolus 250 mL 250 mL (has no administration in time range)   atorvastatin tablet 40 mg (40 mg Oral Given 10/27/23 2108)   hydroxychloroquine tablet 200 mg (200 mg Oral Given 10/28/23 1053)   megestroL 400 mg/10 mL (10 mL) suspension 400 mg (400 mg Oral Given 10/28/23 1104)   montelukast tablet 10 mg (10 mg Oral Given 10/27/23 2108)   tamsulosin 24 hr capsule 0.4 mg (0.4 mg Oral Given 10/28/23 1054)   0.9%  NaCl infusion (for blood administration) (has no administration in time range)   pantoprazole injection 40 mg  (40 mg Intravenous Given 10/28/23 0927)   cefTRIAXone (ROCEPHIN) 2 g in dextrose 5 % in water (D5W) 100 mL IVPB (MB+) (2 g Intravenous New Bag 10/28/23 1058)   metronidazole IVPB 500 mg (0 mg Intravenous Stopped 10/28/23 1010)   albuterol-ipratropium 2.5 mg-0.5 mg/3 mL nebulizer solution 3 mL (0 mLs Nebulization Hold 10/28/23 0800)   0.9%  NaCl infusion (for blood administration) (has no administration in time range)   sucralfate 100 mg/mL suspension 1 g (1 g Oral Given 10/28/23 1215)   methylPREDNISolone sodium succinate injection 40 mg (40 mg Intravenous Given 10/28/23 0927)   0.9%  NaCl infusion ( Intravenous Not Given 10/28/23 0800)   mupirocin 2 % ointment ( Nasal Given 10/28/23 1053)   sodium chloride 0.9% bolus 1,000 mL 1,000 mL (0 mLs Intravenous Stopped 10/25/23 1750)   iohexoL (OMNIPAQUE 350) injection 100 mL (100 mLs Intravenous Given 10/25/23 1730)   iohexoL (OMNIPAQUE 350) injection 100 mL (100 mLs Intravenous Given 10/27/23 1610)   glycerin 99.5% topical solution 100 mL (100 mLs Rectal Given 10/25/23 1928)     And   magnesium citrate solution 296 mL (296 mLs Rectal Given 10/25/23 1928)     And   sodium chloride 0.9% bolus 500 mL 500 mL (0 mLs Rectal Stopped 10/25/23 1946)   piperacillin-tazobactam (ZOSYN) 4.5 g in dextrose 5 % in water (D5W) 100 mL IVPB (MB+) (0 g Intravenous Stopped 10/25/23 2027)   sodium chloride 0.9% bolus 1,000 mL 1,000 mL (0 mLs Intravenous Stopped 10/25/23 2332)   sodium chloride 0.9% bolus 500 mL 500 mL (0 mLs Intravenous Stopped 10/26/23 0842)   glycerin 99.5% topical solution 100 mL (100 mLs Rectal Given 10/26/23 2157)     And   magnesium citrate solution 296 mL (296 mLs Rectal Given 10/26/23 2157)     And   sodium chloride 0.9% bolus 500 mL 500 mL (0 mLs Rectal Stopped 10/26/23 2302)   phytonadione vitamin k (AQUA-MEPHYTON) 10 mg in dextrose 5 % (D5W) 50 mL IVPB (0 mg Intravenous Stopped 10/27/23 1957)   simethicone 40 mg/0.6 mL drops (40 mg  Given 10/28/23 3126)            Current Discharge Medication List            Medical Decision Making    Medical Decision Making  Amount and/or Complexity of Data Reviewed  Labs: ordered. Decision-making details documented in ED Course.  Radiology: ordered. Decision-making details documented in ED Course.  ECG/medicine tests: ordered and independent interpretation performed. Decision-making details documented in ED Course.    Risk  OTC drugs.  Prescription drug management.  Decision regarding hospitalization.                Scribe Attestation:   Scribe #1: I performed the above scribed service and the documentation accurately describes the services I performed. I attest to the accuracy of the note.    Attending:   Physician Attestation Statement for Scribe #1: I, Kit Moser MD, personally performed the services described in this documentation, as scribed by Chanda Ridley, in my presence, and it is both accurate and complete.       Scribe Attestation:   Scribe #2: I performed the above scribed service and the documentation accurately describes the services I performed. I attest to the accuracy of the note.    Attending Attestation:           Physician Attestation for Scribe:    Physician Attestation Statement for Scribe #2: I, Kaycee Rincon MD, reviewed documentation, as scribed by Amy Hazel in my presence, and it is both accurate and complete. I also acknowledge and confirm the content of the note done by Scribe #1.          Clinical Impression       ICD-10-CM ICD-9-CM   1. Pneumonia of both lower lobes due to infectious organism  J18.9 486   2. Weakness  R53.1 780.79   3. Constipation  K59.00 564.00   4. Fecal impaction  K56.41 560.32   5. Right lower lobe lung mass  R91.8 786.6   6. Mass of lower lobe of left lung  R91.8 786.6   7. Shortness of breath  R06.02 786.05   8. Chest pain  R07.9 786.50   9. Melena  K92.1 578.1   10. Sepsis  A41.9 038.9     995.91       Disposition:   Disposition: Admitted  Condition: Fair          Kaycee Rincon MD  10/28/23 5100

## 2023-10-26 DIAGNOSIS — J44.9 COPD (CHRONIC OBSTRUCTIVE PULMONARY DISEASE): Primary | ICD-10-CM

## 2023-10-26 PROBLEM — K92.1 HEMATOCHEZIA: Status: ACTIVE | Noted: 2023-10-26

## 2023-10-26 PROBLEM — D64.9 CHRONIC ANEMIA: Status: ACTIVE | Noted: 2021-07-02

## 2023-10-26 PROBLEM — K21.9 GASTROESOPHAGEAL REFLUX DISEASE: Status: ACTIVE | Noted: 2023-10-26

## 2023-10-26 PROBLEM — A41.9 SEPSIS: Status: ACTIVE | Noted: 2023-10-26

## 2023-10-26 PROBLEM — K59.00 CONSTIPATION: Status: ACTIVE | Noted: 2023-10-26

## 2023-10-26 LAB
ABO + RH BLD: NORMAL
ALBUMIN SERPL BCP-MCNC: 2.1 G/DL (ref 3.5–5.2)
ALLENS TEST: ABNORMAL
ALP SERPL-CCNC: 46 U/L (ref 55–135)
ALT SERPL W/O P-5'-P-CCNC: 78 U/L (ref 10–44)
ANION GAP SERPL CALC-SCNC: 16 MMOL/L (ref 8–16)
AST SERPL-CCNC: 58 U/L (ref 10–40)
BASOPHILS # BLD AUTO: 0.03 K/UL (ref 0–0.2)
BASOPHILS NFR BLD: 0.2 % (ref 0–1.9)
BILIRUB SERPL-MCNC: 0.5 MG/DL (ref 0.1–1)
BLD GP AB SCN CELLS X3 SERPL QL: NORMAL
BLD PROD TYP BPU: NORMAL
BLD PROD TYP BPU: NORMAL
BLOOD UNIT EXPIRATION DATE: NORMAL
BLOOD UNIT EXPIRATION DATE: NORMAL
BLOOD UNIT TYPE CODE: 5100
BLOOD UNIT TYPE CODE: 5100
BLOOD UNIT TYPE: NORMAL
BLOOD UNIT TYPE: NORMAL
BUN SERPL-MCNC: 35 MG/DL (ref 8–23)
CALCIUM SERPL-MCNC: 7.6 MG/DL (ref 8.7–10.5)
CHLORIDE SERPL-SCNC: 107 MMOL/L (ref 95–110)
CO2 SERPL-SCNC: 18 MMOL/L (ref 23–29)
CODING SYSTEM: NORMAL
CODING SYSTEM: NORMAL
CREAT SERPL-MCNC: 1.1 MG/DL (ref 0.5–1.4)
CROSSMATCH INTERPRETATION: NORMAL
CROSSMATCH INTERPRETATION: NORMAL
DACRYOCYTES BLD QL SMEAR: ABNORMAL
DELSYS: ABNORMAL
DIFFERENTIAL METHOD: ABNORMAL
DISPENSE STATUS: NORMAL
DISPENSE STATUS: NORMAL
EOSINOPHIL # BLD AUTO: 0 K/UL (ref 0–0.5)
EOSINOPHIL NFR BLD: 0 % (ref 0–8)
ERYTHROCYTE [DISTWIDTH] IN BLOOD BY AUTOMATED COUNT: 17.4 % (ref 11.5–14.5)
EST. GFR  (NO RACE VARIABLE): >60 ML/MIN/1.73 M^2
FLOW: 4
GLUCOSE SERPL-MCNC: 132 MG/DL (ref 70–110)
HCO3 UR-SCNC: 17.8 MMOL/L (ref 24–28)
HCT VFR BLD AUTO: 16 % (ref 40–54)
HCT VFR BLD AUTO: 24.8 % (ref 40–54)
HGB BLD-MCNC: 5 G/DL (ref 14–18)
HGB BLD-MCNC: 8.3 G/DL (ref 14–18)
IMM GRANULOCYTES # BLD AUTO: 0.74 K/UL (ref 0–0.04)
IMM GRANULOCYTES NFR BLD AUTO: 4.7 % (ref 0–0.5)
INR PPP: 1.4 (ref 0.8–1.2)
LACTATE SERPL-SCNC: 5.9 MMOL/L (ref 0.5–2.2)
LYMPHOCYTES # BLD AUTO: 1.6 K/UL (ref 1–4.8)
LYMPHOCYTES NFR BLD: 9.9 % (ref 18–48)
MCH RBC QN AUTO: 29.8 PG (ref 27–31)
MCHC RBC AUTO-ENTMCNC: 31.3 G/DL (ref 32–36)
MCV RBC AUTO: 95 FL (ref 82–98)
MODE: ABNORMAL
MONOCYTES # BLD AUTO: 1.2 K/UL (ref 0.3–1)
MONOCYTES NFR BLD: 7.3 % (ref 4–15)
NEUTROPHILS # BLD AUTO: 12.3 K/UL (ref 1.8–7.7)
NEUTROPHILS NFR BLD: 77.9 % (ref 38–73)
NRBC BLD-RTO: 0 /100 WBC
NUM UNITS TRANS PACKED RBC: NORMAL
NUM UNITS TRANS PACKED RBC: NORMAL
OVALOCYTES BLD QL SMEAR: ABNORMAL
PCO2 BLDA: 20.4 MMHG (ref 35–45)
PH SMN: 7.55 [PH] (ref 7.35–7.45)
PLATELET # BLD AUTO: 283 K/UL (ref 150–450)
PMV BLD AUTO: 9.7 FL (ref 9.2–12.9)
PO2 BLDA: 120 MMHG (ref 80–100)
POC BE: -5 MMOL/L
POC SATURATED O2: 99 % (ref 95–100)
POIKILOCYTOSIS BLD QL SMEAR: SLIGHT
POTASSIUM SERPL-SCNC: 4.3 MMOL/L (ref 3.5–5.1)
PROCALCITONIN SERPL IA-MCNC: 0.29 NG/ML
PROT SERPL-MCNC: 4.7 G/DL (ref 6–8.4)
PROTHROMBIN TIME: 14.6 SEC (ref 9–12.5)
RBC # BLD AUTO: 1.68 M/UL (ref 4.6–6.2)
SAMPLE: ABNORMAL
SITE: ABNORMAL
SODIUM SERPL-SCNC: 141 MMOL/L (ref 136–145)
SPECIMEN OUTDATE: NORMAL
TROPONIN I SERPL DL<=0.01 NG/ML-MCNC: 0.13 NG/ML (ref 0–0.03)
WBC # BLD AUTO: 15.82 K/UL (ref 3.9–12.7)

## 2023-10-26 PROCEDURE — 86850 RBC ANTIBODY SCREEN: CPT | Performed by: FAMILY MEDICINE

## 2023-10-26 PROCEDURE — S0179 MEGESTROL 20 MG: HCPCS | Performed by: NURSE PRACTITIONER

## 2023-10-26 PROCEDURE — 94640 AIRWAY INHALATION TREATMENT: CPT

## 2023-10-26 PROCEDURE — 99900035 HC TECH TIME PER 15 MIN (STAT)

## 2023-10-26 PROCEDURE — 85610 PROTHROMBIN TIME: CPT | Performed by: FAMILY MEDICINE

## 2023-10-26 PROCEDURE — 80053 COMPREHEN METABOLIC PANEL: CPT | Performed by: FAMILY MEDICINE

## 2023-10-26 PROCEDURE — 25000242 PHARM REV CODE 250 ALT 637 W/ HCPCS: Performed by: FAMILY MEDICINE

## 2023-10-26 PROCEDURE — 27000221 HC OXYGEN, UP TO 24 HOURS

## 2023-10-26 PROCEDURE — 36430 TRANSFUSION BLD/BLD COMPNT: CPT

## 2023-10-26 PROCEDURE — 86920 COMPATIBILITY TEST SPIN: CPT | Performed by: FAMILY MEDICINE

## 2023-10-26 PROCEDURE — 99223 PR INITIAL HOSPITAL CARE,LEVL III: ICD-10-PCS | Mod: ,,, | Performed by: INTERNAL MEDICINE

## 2023-10-26 PROCEDURE — 21400001 HC TELEMETRY ROOM

## 2023-10-26 PROCEDURE — C9113 INJ PANTOPRAZOLE SODIUM, VIA: HCPCS | Performed by: FAMILY MEDICINE

## 2023-10-26 PROCEDURE — 63600175 PHARM REV CODE 636 W HCPCS: Performed by: FAMILY MEDICINE

## 2023-10-26 PROCEDURE — 82803 BLOOD GASES ANY COMBINATION: CPT

## 2023-10-26 PROCEDURE — 84145 PROCALCITONIN (PCT): CPT | Performed by: FAMILY MEDICINE

## 2023-10-26 PROCEDURE — 84484 ASSAY OF TROPONIN QUANT: CPT | Performed by: NURSE PRACTITIONER

## 2023-10-26 PROCEDURE — 85014 HEMATOCRIT: CPT | Performed by: FAMILY MEDICINE

## 2023-10-26 PROCEDURE — 94761 N-INVAS EAR/PLS OXIMETRY MLT: CPT

## 2023-10-26 PROCEDURE — 25000003 PHARM REV CODE 250: Performed by: NURSE PRACTITIONER

## 2023-10-26 PROCEDURE — 94799 UNLISTED PULMONARY SVC/PX: CPT

## 2023-10-26 PROCEDURE — 85025 COMPLETE CBC W/AUTO DIFF WBC: CPT | Performed by: NURSE PRACTITIONER

## 2023-10-26 PROCEDURE — 83605 ASSAY OF LACTIC ACID: CPT | Performed by: NURSE PRACTITIONER

## 2023-10-26 PROCEDURE — 85018 HEMOGLOBIN: CPT | Performed by: FAMILY MEDICINE

## 2023-10-26 PROCEDURE — 36415 COLL VENOUS BLD VENIPUNCTURE: CPT | Performed by: NURSE PRACTITIONER

## 2023-10-26 PROCEDURE — 36415 COLL VENOUS BLD VENIPUNCTURE: CPT | Performed by: FAMILY MEDICINE

## 2023-10-26 PROCEDURE — 25000242 PHARM REV CODE 250 ALT 637 W/ HCPCS: Performed by: NURSE PRACTITIONER

## 2023-10-26 PROCEDURE — P9016 RBC LEUKOCYTES REDUCED: HCPCS | Performed by: FAMILY MEDICINE

## 2023-10-26 PROCEDURE — 99223 1ST HOSP IP/OBS HIGH 75: CPT | Mod: ,,, | Performed by: INTERNAL MEDICINE

## 2023-10-26 PROCEDURE — 25000003 PHARM REV CODE 250: Performed by: FAMILY MEDICINE

## 2023-10-26 PROCEDURE — 36600 WITHDRAWAL OF ARTERIAL BLOOD: CPT

## 2023-10-26 RX ORDER — PSEUDOEPHEDRINE/ACETAMINOPHEN 30MG-500MG
100 TABLET ORAL
Status: COMPLETED | OUTPATIENT
Start: 2023-10-26 | End: 2023-10-26

## 2023-10-26 RX ORDER — MEGESTROL ACETATE 40 MG/ML
400 SUSPENSION ORAL DAILY
Status: DISCONTINUED | OUTPATIENT
Start: 2023-10-26 | End: 2023-10-29 | Stop reason: HOSPADM

## 2023-10-26 RX ORDER — FUROSEMIDE 40 MG/1
40 TABLET ORAL DAILY
Status: DISCONTINUED | OUTPATIENT
Start: 2023-10-26 | End: 2023-10-26

## 2023-10-26 RX ORDER — PREDNISONE 5 MG/1
5 TABLET ORAL DAILY
Status: DISCONTINUED | OUTPATIENT
Start: 2023-10-26 | End: 2023-10-26

## 2023-10-26 RX ORDER — HYDROCODONE BITARTRATE AND ACETAMINOPHEN 500; 5 MG/1; MG/1
TABLET ORAL
Status: DISCONTINUED | OUTPATIENT
Start: 2023-10-26 | End: 2023-10-29 | Stop reason: HOSPADM

## 2023-10-26 RX ORDER — MONTELUKAST SODIUM 10 MG/1
10 TABLET ORAL NIGHTLY
Status: DISCONTINUED | OUTPATIENT
Start: 2023-10-26 | End: 2023-10-29 | Stop reason: HOSPADM

## 2023-10-26 RX ORDER — IPRATROPIUM BROMIDE AND ALBUTEROL SULFATE 2.5; .5 MG/3ML; MG/3ML
3 SOLUTION RESPIRATORY (INHALATION)
Status: DISCONTINUED | OUTPATIENT
Start: 2023-10-26 | End: 2023-10-29 | Stop reason: HOSPADM

## 2023-10-26 RX ORDER — PANTOPRAZOLE SODIUM 40 MG/10ML
40 INJECTION, POWDER, LYOPHILIZED, FOR SOLUTION INTRAVENOUS 2 TIMES DAILY
Status: DISCONTINUED | OUTPATIENT
Start: 2023-10-26 | End: 2023-10-29

## 2023-10-26 RX ORDER — HYDROXYCHLOROQUINE SULFATE 200 MG/1
200 TABLET, FILM COATED ORAL DAILY
Status: DISCONTINUED | OUTPATIENT
Start: 2023-10-26 | End: 2023-10-29 | Stop reason: HOSPADM

## 2023-10-26 RX ORDER — TAMSULOSIN HYDROCHLORIDE 0.4 MG/1
0.4 CAPSULE ORAL DAILY
Status: DISCONTINUED | OUTPATIENT
Start: 2023-10-26 | End: 2023-10-29 | Stop reason: HOSPADM

## 2023-10-26 RX ORDER — PANTOPRAZOLE SODIUM 40 MG/1
40 TABLET, DELAYED RELEASE ORAL DAILY
Status: DISCONTINUED | OUTPATIENT
Start: 2023-10-26 | End: 2023-10-26

## 2023-10-26 RX ORDER — SYRING-NEEDL,DISP,INSUL,0.3 ML 29 G X1/2"
296 SYRINGE, EMPTY DISPOSABLE MISCELLANEOUS
Status: COMPLETED | OUTPATIENT
Start: 2023-10-26 | End: 2023-10-26

## 2023-10-26 RX ORDER — ATORVASTATIN CALCIUM 40 MG/1
40 TABLET, FILM COATED ORAL NIGHTLY
Status: DISCONTINUED | OUTPATIENT
Start: 2023-10-26 | End: 2023-10-29 | Stop reason: HOSPADM

## 2023-10-26 RX ORDER — METRONIDAZOLE 500 MG/100ML
500 INJECTION, SOLUTION INTRAVENOUS
Status: DISCONTINUED | OUTPATIENT
Start: 2023-10-26 | End: 2023-10-29

## 2023-10-26 RX ADMIN — MEGESTROL ACETATE 400 MG: 40 SUSPENSION ORAL at 02:10

## 2023-10-26 RX ADMIN — MONTELUKAST 10 MG: 10 TABLET, FILM COATED ORAL at 09:10

## 2023-10-26 RX ADMIN — METHYLPREDNISOLONE SODIUM SUCCINATE 40 MG: 40 INJECTION, POWDER, FOR SOLUTION INTRAMUSCULAR; INTRAVENOUS at 02:10

## 2023-10-26 RX ADMIN — SODIUM CHLORIDE 500 ML: 9 INJECTION, SOLUTION INTRAVENOUS at 07:10

## 2023-10-26 RX ADMIN — ATORVASTATIN CALCIUM 40 MG: 40 TABLET, FILM COATED ORAL at 09:10

## 2023-10-26 RX ADMIN — IPRATROPIUM BROMIDE AND ALBUTEROL SULFATE 3 ML: 2.5; .5 SOLUTION RESPIRATORY (INHALATION) at 03:10

## 2023-10-26 RX ADMIN — PANTOPRAZOLE SODIUM 40 MG: 40 INJECTION, POWDER, FOR SOLUTION INTRAVENOUS at 09:10

## 2023-10-26 RX ADMIN — METHYLPREDNISOLONE SODIUM SUCCINATE 40 MG: 40 INJECTION, POWDER, FOR SOLUTION INTRAMUSCULAR; INTRAVENOUS at 09:10

## 2023-10-26 RX ADMIN — CEFTRIAXONE 2 G: 2 INJECTION, POWDER, FOR SOLUTION INTRAMUSCULAR; INTRAVENOUS at 08:10

## 2023-10-26 RX ADMIN — HYDROXYCHLOROQUINE SULFATE 200 MG: 200 TABLET, FILM COATED ORAL at 02:10

## 2023-10-26 RX ADMIN — SODIUM CHLORIDE 500 ML: 9 INJECTION, SOLUTION INTRAVENOUS at 10:10

## 2023-10-26 RX ADMIN — FUROSEMIDE 40 MG: 40 TABLET ORAL at 02:10

## 2023-10-26 RX ADMIN — IPRATROPIUM BROMIDE AND ALBUTEROL SULFATE 3 ML: 2.5; .5 SOLUTION RESPIRATORY (INHALATION) at 08:10

## 2023-10-26 RX ADMIN — BE HEALTH MAGNESIUM CITRATE ORAL SOLUTION - LEMON 296 ML: 1.75 LIQUID ORAL at 09:10

## 2023-10-26 RX ADMIN — IPRATROPIUM BROMIDE AND ALBUTEROL SULFATE 3 ML: 2.5; .5 SOLUTION RESPIRATORY (INHALATION) at 05:10

## 2023-10-26 RX ADMIN — Medication 100 ML: at 09:10

## 2023-10-26 RX ADMIN — TAMSULOSIN HYDROCHLORIDE 0.4 MG: 0.4 CAPSULE ORAL at 02:10

## 2023-10-26 NOTE — ASSESSMENT & PLAN NOTE
"Patient's FSGs are uncontrolled due to hyperglycemia on current medication regimen.  Last A1c reviewed-   Lab Results   Component Value Date    HGBA1C 7.5 (H) 09/02/2021     Most recent fingerstick glucose reviewed- No results for input(s): "POCTGLUCOSE" in the last 24 hours.  Current correctional scale  Low  Maintain anti-hyperglycemic dose as follows-   Antihyperglycemics (From admission, onward)    None        Most recent A1c measuring   Hemoglobin A1C   Date Value Ref Range Status   09/02/2021 7.5 (H) <=6.5 % Final      Plan:  -SSI  -Accu-checks   -Hypoglycemic protocol   -Hold any oral antihyperglycemics while inpatient       "

## 2023-10-26 NOTE — H&P (VIEW-ONLY)
[facility] - Telemetry ([facility])  Gastroenterology  Consult Note    Patient Name: Navdeep Sesay Jr.  MRN: [id number]  Admission Date: 10/25/2023  Hospital Length of Stay: 1 days  Code Status: Full Code   Attending Provider: Michele Vang MD   Consulting Provider: Jose Lopez PA-C  Primary Care Physician: Graciela, Primary Doctor  Principal Problem:Sepsis    Inpatient consult to Gastroenterology  Consult performed by: Jose Lopez PA-C  Consult ordered by: Michele Vang MD  Reason for consult: GI bleed        Subjective:     HPI:  The patient presented to the ER for constipation and SOB. He reported he had not had a BM in three days. He had tried several OTC medications without relief. He was also manually disimpacting himself at home. AXR showed a large volume of stool througout the colon with developing fecal impaction and transverse colon 6.2 cm. He was given a brown bomb enema with improvement per patient.     We have been consulted for hematochezia and drop in Hgb. The patient has a history of chronic anemia followed by Hematology. He received a blood transfusion two weeks ago. Home medications include ASA, MTX, Humira and PPI. No anticoagulation. Hgb yesterday was 8.6 and today is 5.0. Two units of blood now ordered. , INR 1.4, BUN 33 and creatinine 1.4. lactate 5.9, Troponin 0.129, . He is tachycardiac and BP is on the lower end. He is on 3L NC at home.  He is receiving IV Protonix bid, and was started on Zosyn and Flagyl. He lethargic and working to breath. He is able to answer yes/no questions. He says he had a colonoscopy in the past, but no additional information. Additional history was obtained from the medical record and medical staff.         Past Medical History:   Diagnosis Date    Aortic stenosis     Chronic anemia     COPD (chronic obstructive pulmonary disease)     Hypertension     Rheumatoid arthritis        History reviewed. No pertinent surgical history.    Review of  patient's allergies indicates:  No Known Allergies  Family History       Problem Relation (Age of Onset)    No Known Problems Mother, Father          Tobacco Use    Smoking status: Never    Smokeless tobacco: Never   Substance and Sexual Activity    Alcohol use: Never    Drug use: Never    Sexual activity: Not Currently     Review of Systems   Constitutional:  Negative for fever.   HENT:  Negative for hearing loss.    Eyes:  Negative for visual disturbance.   Respiratory:  Positive for shortness of breath. Negative for cough.    Cardiovascular:  Negative for chest pain and palpitations.   Gastrointestinal:         As per HPI.   Genitourinary:  Negative for difficulty urinating, dysuria, frequency and hematuria.   Neurological:  Positive for weakness. Negative for seizures, syncope, numbness and headaches.     Objective:     Vital Signs (Most Recent):  Temp: 98 °F (36.7 °C) (10/26/23 0507)  Pulse: (!) 113 (10/26/23 0519)  Resp: 20 (10/26/23 0519)  BP: (!) 104/57 (10/26/23 0507)  SpO2: 100 % (10/26/23 0809) Vital Signs (24h Range):  Temp:  [98 °F (36.7 °C)-98.4 °F (36.9 °C)] 98 °F (36.7 °C)  Pulse:  [108-117] 113  Resp:  [16-20] 20  SpO2:  [95 %-100 %] 100 %  BP: (103-121)/(56-66) 104/57     Weight: 63.1 kg (139 lb 1.8 oz) (10/26/23 0015)  Body mass index is 19.4 kg/m².    No intake or output data in the 24 hours ending 10/26/23 0834    Lines/Drains/Airways       Peripheral Intravenous Line  Duration                  Peripheral IV - Single Lumen 10/25/23 1659 20 G Left Antecubital <1 day                     Physical Exam  Constitutional:       Appearance: He is ill-appearing.   HENT:      Head: Normocephalic and atraumatic.   Eyes:      Extraocular Movements: Extraocular movements intact.   Cardiovascular:      Rate and Rhythm: Regular rhythm. Tachycardia present.      Heart sounds: Normal heart sounds. No murmur heard.  Pulmonary:      Effort: Accessory muscle usage present.      Breath sounds: Rhonchi present.    Abdominal:      General: Bowel sounds are normal. There is no distension.      Palpations: Abdomen is soft. There is no mass.      Tenderness: There is no abdominal tenderness.   Musculoskeletal:      Cervical back: Normal range of motion and neck supple.      Right lower leg: No edema.      Left lower leg: No edema.   Skin:     General: Skin is warm and dry.      Findings: No rash.   Neurological:      Mental Status: He is oriented to person, place, and time and easily aroused. He is lethargic.      Cranial Nerves: No cranial nerve deficit.   Psychiatric:         Behavior: Behavior normal.          Significant Labs:  CBC:   Recent Labs   Lab 10/25/23  1509 10/26/23  0537   WBC 15.94* 15.82*   HGB 8.6* 5.0*   HCT 27.3* 16.0*    283     CMP:   Recent Labs   Lab 10/25/23  1509   *   CALCIUM 9.2   ALBUMIN 2.8*   PROT 6.6      K 3.8   CO2 23      BUN 33*   CREATININE 1.4   ALKPHOS 58   ALT 73*   AST 38   BILITOT 0.6     Coagulation:   Recent Labs   Lab 10/26/23  0752   INR 1.4*       Significant Imaging:  Imaging results within the past 24 hours have been reviewed.    Assessment/Plan:     Pulmonary  COPD (chronic obstructive pulmonary disease)  Patient currently working to breath. ABG ordered by  and pending.  considering transfer to ICU.     Oncology  Chronic anemia  Acute on chronic anemia in the setting of overt bleeding.   Given the recent constipation, rectal ulcer would be high on the differential.  Recommend STAT bleeding imaging to assess for location and possibility of IR treatment.   Agree with transfusion and IV Protonix.   Continue to monitor H/H.   Keep NPO.     GI  Hematochezia  See plan under anemia.     Constipation  Currently having BMs. Will monitor and consider bowel regimen at acute bleeding addressed.         Thank you for your consult. I will follow-up with patient. Please contact us if you have any additional questions.    Jose Lopez,  MYCHAL  Gastroenterology  [facility] - Telemetry ([facility])

## 2023-10-26 NOTE — SUBJECTIVE & OBJECTIVE
Interval History: See hospital course for today    Review of Systems   Constitutional:  Negative for fever.   Respiratory:  Positive for shortness of breath.    Gastrointestinal:  Positive for constipation (improved). Negative for abdominal pain.   Psychiatric/Behavioral:  Positive for confusion. Negative for sleep disturbance.      Objective:     Vital Signs (Most Recent):  Temp: 98 °F (36.7 °C) (10/26/23 0507)  Pulse: (!) 113 (10/26/23 0519)  Resp: 20 (10/26/23 0519)  BP: (!) 104/57 (10/26/23 0507)  SpO2: 100 % (10/26/23 0519) Vital Signs (24h Range):  Temp:  [98 °F (36.7 °C)-98.4 °F (36.9 °C)] 98 °F (36.7 °C)  Pulse:  [108-117] 113  Resp:  [16-20] 20  SpO2:  [95 %-100 %] 100 %  BP: (103-121)/(56-66) 104/57     Weight: 63.1 kg (139 lb 1.8 oz)  Body mass index is 19.4 kg/m².  No intake or output data in the 24 hours ending 10/26/23 0745      Physical Exam  Vitals and nursing note reviewed.   Constitutional:       General: He is sleeping. He is not in acute distress.     Appearance: He is cachectic. He is ill-appearing. He is not toxic-appearing.      Interventions: Nasal cannula in place.   HENT:      Head: Normocephalic and atraumatic.   Cardiovascular:      Rate and Rhythm: Tachycardia present.   Pulmonary:      Effort: Tachypnea and respiratory distress present.   Abdominal:      General: There is distension.      Palpations: Abdomen is soft.      Tenderness: There is no abdominal tenderness.   Skin:     General: Skin is warm.      Capillary Refill: Capillary refill takes 2 to 3 seconds.      Coloration: Skin is pale.   Neurological:      Mental Status: He is easily aroused. He is lethargic and disoriented.      Motor: Weakness present.           Significant Labs: All pertinent labs within the past 24 hours have been reviewed.  CBC:   Recent Labs   Lab 10/25/23  1509 10/26/23  0537   WBC 15.94* 15.82*   HGB 8.6* 5.0*   HCT 27.3* 16.0*    283     CMP:   Recent Labs   Lab 10/25/23  1509      K 3.8       CO2 23   *   BUN 33*   CREATININE 1.4   CALCIUM 9.2   PROT 6.6   ALBUMIN 2.8*   BILITOT 0.6   ALKPHOS 58   AST 38   ALT 73*   ANIONGAP 16       Lactic Acid:   Recent Labs   Lab 10/25/23  2030 10/25/23  2144 10/26/23  0642   LACTATE 7.7* 6.6* 5.9*       Significant Imaging: I have reviewed all pertinent imaging results/findings within the past 24 hours.  CT: I have reviewed all pertinent results/findings within the past 24 hours and my personal findings are:  negative for pulmonary embolism; large stool burden by my interpretation

## 2023-10-26 NOTE — ASSESSMENT & PLAN NOTE
Unknown baseline without evidence of active bleeding noted.   Recent Labs   Lab 10/25/23  1509 10/26/23  0537   HGB 8.6* 5.0*   HCT 27.3* 16.0*   MCV 93 95   MCHC 31.5* 31.3*   RDW 17.1* 17.4*    283     Plan:  -Monitor H/H and plts  -Type and screen, transfuse as needed   -Continue home medications   -Hold antiplatelets/anticoagulants in setting of active bleeding/pending surgical intervention       Blood transfusion  GI bleed   Gastroenterology consult

## 2023-10-26 NOTE — AI DETERIORATION ALERT
Artificial Intelligence Notification  [facility]  [id number] [place] [address] [address] [address]  Phone: [phone]    This documentation was triggered by an Artificial Intelligence Notification:    Admit Date: 10/25/2023   LOS: 1  Code Status: Full Code  : 1945  Age: 78 y.o.  Weight:   Wt Readings from Last 1 Encounters:   10/26/23 63.1 kg (139 lb 1.8 oz)        Sex: male  Bed: A222/A222 A  MRN: [id number]  Attending Physician: Michele Vang MD     Date of Alert: 10/26/2023  Time AI Alert Received: 7:18AM            Vitals:    10/26/23 0519   BP:    Pulse: (!) 113   Resp: 20   Temp:      SpO2: 100 %      Artificial Intelligence alert discussed with Provider:     Name: Dr. Vang   Date/Time of Provider Notification: 10/26/23 at 7:18AM      Patient Condition: stable

## 2023-10-26 NOTE — ASSESSMENT & PLAN NOTE
Patient currently working to breath. ABG ordered by  and pending. HM considering transfer to ICU.

## 2023-10-26 NOTE — CONSULTS
[facility] - Telemetry ([facility])  Gastroenterology  Consult Note    Patient Name: Navdeep Sesay Jr.  MRN: [id number]  Admission Date: 10/25/2023  Hospital Length of Stay: 1 days  Code Status: Full Code   Attending Provider: Michele Vang MD   Consulting Provider: Jose Lopez PA-C  Primary Care Physician: Graciela, Primary Doctor  Principal Problem:Sepsis    Inpatient consult to Gastroenterology  Consult performed by: Jose Lopez PA-C  Consult ordered by: Michele Vang MD  Reason for consult: GI bleed        Subjective:     HPI:  The patient presented to the ER for constipation and SOB. He reported he had not had a BM in three days. He had tried several OTC medications without relief. He was also manually disimpacting himself at home. AXR showed a large volume of stool througout the colon with developing fecal impaction and transverse colon 6.2 cm. He was given a brown bomb enema with improvement per patient.     We have been consulted for hematochezia and drop in Hgb. The patient has a history of chronic anemia followed by Hematology. He received a blood transfusion two weeks ago. Home medications include ASA, MTX, Humira and PPI. No anticoagulation. Hgb yesterday was 8.6 and today is 5.0. Two units of blood now ordered. , INR 1.4, BUN 33 and creatinine 1.4. lactate 5.9, Troponin 0.129, . He is tachycardiac and BP is on the lower end. He is on 3L NC at home.  He is receiving IV Protonix bid, and was started on Zosyn and Flagyl. He lethargic and working to breath. He is able to answer yes/no questions. He says he had a colonoscopy in the past, but no additional information. Additional history was obtained from the medical record and medical staff.         Past Medical History:   Diagnosis Date    Aortic stenosis     Chronic anemia     COPD (chronic obstructive pulmonary disease)     Hypertension     Rheumatoid arthritis        History reviewed. No pertinent surgical history.    Review of  patient's allergies indicates:  No Known Allergies  Family History       Problem Relation (Age of Onset)    No Known Problems Mother, Father          Tobacco Use    Smoking status: Never    Smokeless tobacco: Never   Substance and Sexual Activity    Alcohol use: Never    Drug use: Never    Sexual activity: Not Currently     Review of Systems   Constitutional:  Negative for fever.   HENT:  Negative for hearing loss.    Eyes:  Negative for visual disturbance.   Respiratory:  Positive for shortness of breath. Negative for cough.    Cardiovascular:  Negative for chest pain and palpitations.   Gastrointestinal:         As per HPI.   Genitourinary:  Negative for difficulty urinating, dysuria, frequency and hematuria.   Neurological:  Positive for weakness. Negative for seizures, syncope, numbness and headaches.     Objective:     Vital Signs (Most Recent):  Temp: 98 °F (36.7 °C) (10/26/23 0507)  Pulse: (!) 113 (10/26/23 0519)  Resp: 20 (10/26/23 0519)  BP: (!) 104/57 (10/26/23 0507)  SpO2: 100 % (10/26/23 0809) Vital Signs (24h Range):  Temp:  [98 °F (36.7 °C)-98.4 °F (36.9 °C)] 98 °F (36.7 °C)  Pulse:  [108-117] 113  Resp:  [16-20] 20  SpO2:  [95 %-100 %] 100 %  BP: (103-121)/(56-66) 104/57     Weight: 63.1 kg (139 lb 1.8 oz) (10/26/23 0015)  Body mass index is 19.4 kg/m².    No intake or output data in the 24 hours ending 10/26/23 0834    Lines/Drains/Airways       Peripheral Intravenous Line  Duration                  Peripheral IV - Single Lumen 10/25/23 1659 20 G Left Antecubital <1 day                     Physical Exam  Constitutional:       Appearance: He is ill-appearing.   HENT:      Head: Normocephalic and atraumatic.   Eyes:      Extraocular Movements: Extraocular movements intact.   Cardiovascular:      Rate and Rhythm: Regular rhythm. Tachycardia present.      Heart sounds: Normal heart sounds. No murmur heard.  Pulmonary:      Effort: Accessory muscle usage present.      Breath sounds: Rhonchi present.    Abdominal:      General: Bowel sounds are normal. There is no distension.      Palpations: Abdomen is soft. There is no mass.      Tenderness: There is no abdominal tenderness.   Musculoskeletal:      Cervical back: Normal range of motion and neck supple.      Right lower leg: No edema.      Left lower leg: No edema.   Skin:     General: Skin is warm and dry.      Findings: No rash.   Neurological:      Mental Status: He is oriented to person, place, and time and easily aroused. He is lethargic.      Cranial Nerves: No cranial nerve deficit.   Psychiatric:         Behavior: Behavior normal.          Significant Labs:  CBC:   Recent Labs   Lab 10/25/23  1509 10/26/23  0537   WBC 15.94* 15.82*   HGB 8.6* 5.0*   HCT 27.3* 16.0*    283     CMP:   Recent Labs   Lab 10/25/23  1509   *   CALCIUM 9.2   ALBUMIN 2.8*   PROT 6.6      K 3.8   CO2 23      BUN 33*   CREATININE 1.4   ALKPHOS 58   ALT 73*   AST 38   BILITOT 0.6     Coagulation:   Recent Labs   Lab 10/26/23  0752   INR 1.4*       Significant Imaging:  Imaging results within the past 24 hours have been reviewed.    Assessment/Plan:     Pulmonary  COPD (chronic obstructive pulmonary disease)  Patient currently working to breath. ABG ordered by  and pending.  considering transfer to ICU.     Oncology  Chronic anemia  Acute on chronic anemia in the setting of overt bleeding.   Given the recent constipation, rectal ulcer would be high on the differential.  Recommend STAT bleeding imaging to assess for location and possibility of IR treatment.   Agree with transfusion and IV Protonix.   Continue to monitor H/H.   Keep NPO.     GI  Hematochezia  See plan under anemia.     Constipation  Currently having BMs. Will monitor and consider bowel regimen at acute bleeding addressed.         Thank you for your consult. I will follow-up with patient. Please contact us if you have any additional questions.    Jose Lopez,  MYCHAL  Gastroenterology  [facility] - Telemetry ([facility])

## 2023-10-26 NOTE — H&P
[facility]  History & Physical    Patient Name: Navdeep Sesay Jr.  MRN: [id number]  Patient Class: IP- Inpatient  Admission Date: 10/25/2023  Attending Physician: Blas Maldonado MD   Primary Care Provider: Graciela Primary Doctor         Patient information was obtained from patient, past medical records and ER records.     Subjective:     Principal Problem:Sepsis    Chief Complaint:   Chief Complaint   Patient presents with    Constipation     Pt states he has been having constipation x 3 days and also SOB. Pt is normally on 3 LPM at home and is currently o2 stat is 100 on room air        HPI: Navdeep Sesay Jr. is a 78 y.o. male with a PMH  has a past medical history of Aortic stenosis, Chronic anemia, COPD (chronic obstructive pulmonary disease), Hypertension, and Rheumatoid arthritis.  Presented to the ER for evaluation of constipation.  Patient reports he has been having difficulty having a bowel movement over the last 3 days.  No relief with MiraLax.  Patient reports removing stool from his rectum digitally due to severe constipation.  Patient also reports some shortness of breath on exertion.  Patient chronically wears home O2 at 3 liters/minute via nasal cannula.  Patient reports he had a blood transfusion 2 weeks ago secondary to his symptomatic anemia.  Denies any chest pain or any other symptoms at this time.    ER workup revealed leukocytosis of 15.94, H/H of 8.6/27.3, , , troponin 0.080, lactic acid 2.5 CTA of the chest negative for PE, flat and erect abdomen x-ray revealed large stool burden and possible fecal impaction, chest x-ray revealed no acute findings, and EKG revealing sinus tachycardia with PACs with a ventricular rate of 115 beats per minute, RBBB, QT/QTC of 358/495.  Patient received Mag citrate/glycerin enema, 1 L of normal saline and 4.5 g of Zosyn in ED. hospital Medicine consulted.  Patient in agreement with treatment plan.  Patient  will be admitted under inpatient status.    PCP: No, Primary Doctor          Past Medical History:   Diagnosis Date    Aortic stenosis     Chronic anemia     COPD (chronic obstructive pulmonary disease)     Hypertension     Rheumatoid arthritis        History reviewed. No pertinent surgical history.    Review of patient's allergies indicates:  No Known Allergies    No current facility-administered medications on file prior to encounter.     Current Outpatient Medications on File Prior to Encounter   Medication Sig    alogliptin (NESINA) 25 mg Tab Take 1 tablet by mouth once daily.    aspirin (ECOTRIN) 81 MG EC tablet Take 81 mg by mouth once daily.    atorvastatin (LIPITOR) 80 MG tablet Take 40 mg by mouth every evening.    calcium carbonate (OS-DANIA) 500 mg calcium (1,250 mg) tablet Take 1,250 mg by mouth once daily.    cetirizine (ZYRTEC) 10 MG tablet Take 10 mg by mouth once daily.    fluticasone-salmeterol diskus inhaler 250-50 mcg Inhale 1 puff into the lungs 2 (two) times daily. Controller    folic acid (FOLVITE) 1 MG tablet Take 1 mg by mouth once daily.    furosemide (LASIX) 40 MG tablet Take 1 tablet (40 mg total) by mouth once daily.    guaiFENesin (MUCINEX) 600 mg 12 hr tablet Take 1,200 mg by mouth every 12 (twelve) hours.    hydroxychloroquine (PLAQUENIL) 200 mg tablet Take by mouth once daily.    megestroL (MEGACE) 400 mg/10 mL (40 mg/mL) Susp Take 400 mg by mouth once daily.    methotrexate 2.5 MG Tab Take 25 mg by mouth every 7 days. (Mondays)    montelukast (SINGULAIR) 10 mg tablet Take 10 mg by mouth every evening.    omeprazole (PRILOSEC) 20 MG capsule Take 20 mg by mouth once daily.    predniSONE (DELTASONE) 5 MG tablet Take 5 mg by mouth once daily.    tamsulosin (FLOMAX) 0.4 mg Cap Take 0.4 mg by mouth once daily.    traMADoL (ULTRAM) 50 mg tablet Take 100 mg by mouth 2 (two) times daily as needed for Pain.    adalimumab (HUMIRA) 10 mg/0.2 mL SyKt Inject 10 mg into the skin every 14 (fourteen)  days.    albuterol (ACCUNEB) 1.25 mg/3 mL Nebu Take 1.25 mg by nebulization every 6 (six) hours as needed. Rescue    amLODIPine (NORVASC) 10 MG tablet Take 10 mg by mouth once daily.    losartan (COZAAR) 100 MG tablet Take 100 mg by mouth once daily.     Family History       Problem Relation (Age of Onset)    No Known Problems Mother, Father          Tobacco Use    Smoking status: Never    Smokeless tobacco: Never   Substance and Sexual Activity    Alcohol use: Never    Drug use: Never    Sexual activity: Not Currently     Review of Systems   Constitutional:  Positive for fatigue. Negative for chills, diaphoresis and fever.   Gastrointestinal:  Positive for constipation. Negative for abdominal distention, abdominal pain, blood in stool, diarrhea, nausea and vomiting.   Neurological:  Positive for weakness. Negative for syncope.   All other systems reviewed and are negative.    Objective:     Vital Signs (Most Recent):  Temp: 98.4 °F (36.9 °C) (10/26/23 0015)  Pulse: (!) 116 (10/26/23 0400)  Resp: 19 (10/26/23 0015)  BP: (!) 121/56 (10/26/23 0015)  SpO2: 100 % (10/26/23 0015) Vital Signs (24h Range):  Temp:  [98.1 °F (36.7 °C)-98.4 °F (36.9 °C)] 98.4 °F (36.9 °C)  Pulse:  [108-117] 116  Resp:  [16-20] 19  SpO2:  [95 %-100 %] 100 %  BP: (103-121)/(56-66) 121/56     Weight: 63.1 kg (139 lb 1.8 oz)  Body mass index is 19.4 kg/m².     Physical Exam  Vitals and nursing note reviewed.   Constitutional:       General: He is awake. He is not in acute distress.     Appearance: Normal appearance. He is well-developed and well-groomed. He is ill-appearing. He is not toxic-appearing or diaphoretic.   HENT:      Head: Normocephalic and atraumatic.   Eyes:      Extraocular Movements: Extraocular movements intact.      Conjunctiva/sclera: Conjunctivae normal.   Cardiovascular:      Rate and Rhythm: Normal rate and regular rhythm.      Pulses: Normal pulses.      Heart sounds: Normal heart sounds. No murmur heard.  Pulmonary:       Effort: Pulmonary effort is normal.      Breath sounds: Normal breath sounds.   Abdominal:      General: Bowel sounds are normal.      Palpations: Abdomen is soft.      Tenderness: There is no abdominal tenderness.   Musculoskeletal:      Cervical back: Normal range of motion and neck supple.      Comments: 5/5 strength throughout   Skin:     General: Skin is warm and dry.      Capillary Refill: Capillary refill takes less than 2 seconds.   Neurological:      General: No focal deficit present.      Mental Status: He is alert and oriented to person, place, and time. Mental status is at baseline.      GCS: GCS eye subscore is 4. GCS verbal subscore is 5. GCS motor subscore is 6.      Cranial Nerves: Cranial nerves 2-12 are intact.      Sensory: Sensation is intact.      Motor: Motor function is intact.   Psychiatric:         Mood and Affect: Mood normal.         Behavior: Behavior normal. Behavior is cooperative.           LABS:  Recent Results (from the past 24 hour(s))   HIV 1/2 Ag/Ab (4th Gen)    Collection Time: 10/25/23  3:09 PM   Result Value Ref Range    HIV 1/2 Ag/Ab Negative Negative   Hepatitis C Antibody    Collection Time: 10/25/23  3:09 PM   Result Value Ref Range    Hepatitis C Ab Negative Negative   HCV Virus Hold Specimen    Collection Time: 10/25/23  3:09 PM   Result Value Ref Range    HEP C Virus Hold Specimen Hold for HCV sendout    CBC Auto Differential    Collection Time: 10/25/23  3:09 PM   Result Value Ref Range    WBC 15.94 (H) 3.90 - 12.70 K/uL    RBC 2.94 (L) 4.60 - 6.20 M/uL    Hemoglobin 8.6 (L) 14.0 - 18.0 g/dL    Hematocrit 27.3 (L) 40.0 - 54.0 %    MCV 93 82 - 98 fL    MCH 29.3 27.0 - 31.0 pg    MCHC 31.5 (L) 32.0 - 36.0 g/dL    RDW 17.1 (H) 11.5 - 14.5 %    Platelets 278 150 - 450 K/uL    MPV 9.1 (L) 9.2 - 12.9 fL    Immature Granulocytes 3.3 (H) 0.0 - 0.5 %    Gran # (ANC) 13.7 (H) 1.8 - 7.7 K/uL    Immature Grans (Abs) 0.52 (H) 0.00 - 0.04 K/uL    Lymph # 0.9 (L) 1.0 - 4.8 K/uL     Mono # 0.8 0.3 - 1.0 K/uL    Eos # 0.0 0.0 - 0.5 K/uL    Baso # 0.03 0.00 - 0.20 K/uL    nRBC 0 0 /100 WBC    Gran % 86.0 (H) 38.0 - 73.0 %    Lymph % 5.4 (L) 18.0 - 48.0 %    Mono % 5.1 4.0 - 15.0 %    Eosinophil % 0.0 0.0 - 8.0 %    Basophil % 0.2 0.0 - 1.9 %    Differential Method Automated    Comprehensive Metabolic Panel    Collection Time: 10/25/23  3:09 PM   Result Value Ref Range    Sodium 139 136 - 145 mmol/L    Potassium 3.8 3.5 - 5.1 mmol/L    Chloride 100 95 - 110 mmol/L    CO2 23 23 - 29 mmol/L    Glucose 131 (H) 70 - 110 mg/dL    BUN 33 (H) 8 - 23 mg/dL    Creatinine 1.4 0.5 - 1.4 mg/dL    Calcium 9.2 8.7 - 10.5 mg/dL    Total Protein 6.6 6.0 - 8.4 g/dL    Albumin 2.8 (L) 3.5 - 5.2 g/dL    Total Bilirubin 0.6 0.1 - 1.0 mg/dL    Alkaline Phosphatase 58 55 - 135 U/L    AST 38 10 - 40 U/L    ALT 73 (H) 10 - 44 U/L    eGFR 51 (A) >60 mL/min/1.73 m^2    Anion Gap 16 8 - 16 mmol/L   BNP    Collection Time: 10/25/23  3:09 PM   Result Value Ref Range     (H) 0 - 99 pg/mL   CK    Collection Time: 10/25/23  3:09 PM   Result Value Ref Range    CPK 57 20 - 200 U/L   Troponin I    Collection Time: 10/25/23  3:09 PM   Result Value Ref Range    Troponin I 0.080 (H) 0.000 - 0.026 ng/mL   Blood culture    Collection Time: 10/25/23  4:23 PM    Specimen: Peripheral, Antecubital, Left; Blood   Result Value Ref Range    Blood Culture, Routine No Growth to date    Blood culture    Collection Time: 10/25/23  4:25 PM    Specimen: Peripheral, Hand, Left; Blood   Result Value Ref Range    Blood Culture, Routine No Growth to date    Lactic acid, plasma    Collection Time: 10/25/23  4:25 PM   Result Value Ref Range    Lactate (Lactic Acid) 2.5 (H) 0.5 - 2.2 mmol/L   Urinalysis, Reflex to Urine Culture Urine, Clean Catch    Collection Time: 10/25/23  8:11 PM    Specimen: Urine   Result Value Ref Range    Specimen UA Urine, Clean Catch     Color, UA Yellow Yellow, Straw, Mary    Appearance, UA Clear Clear    pH, UA 7.0 5.0  - 8.0    Specific Gravity, UA >1.030 (A) 1.005 - 1.030    Protein, UA Trace (A) Negative    Glucose, UA Negative Negative    Ketones, UA Negative Negative    Bilirubin (UA) Negative Negative    Occult Blood UA Negative Negative    Nitrite, UA Negative Negative    Urobilinogen, UA Negative <2.0 EU/dL    Leukocytes, UA Negative Negative   Lactic acid, plasma    Collection Time: 10/25/23  8:30 PM   Result Value Ref Range    Lactate (Lactic Acid) 7.7 (HH) 0.5 - 2.2 mmol/L   Troponin I    Collection Time: 10/25/23  8:30 PM   Result Value Ref Range    Troponin I 0.099 (H) 0.000 - 0.026 ng/mL   Lactic acid, plasma    Collection Time: 10/25/23  9:44 PM   Result Value Ref Range    Lactate (Lactic Acid) 6.6 (HH) 0.5 - 2.2 mmol/L       RADIOLOGY  CTA Chest Non-Coronary (PE Studies)    Result Date: 10/25/2023  EXAMINATION: CTA CHEST NON CORONARY (PE STUDIES) CLINICAL HISTORY: PE suspected, intermediate prob, neg D-dimer; TECHNIQUE: Low dose axial images, sagittal and coronal reformations were obtained from the thoracic inlet to the lung bases following the IV administration of 100 mL of Omnipaque 350.  Contrast timing was optimized to evaluate the pulmonary arteries.  MIP images were performed. COMPARISON: None FINDINGS: No evidence for pulmonary embolism.  No evidence for aortic dissection or aneurysm.  Cardiovascular structures have a normal non gated appearance. Spiculated nodules in the right lower lobe measures 7.2 mm. Necrotic irregular mass in the left lower lobe measuring 4.2 x 5.1 cm Extensive emphysema.  Recommend annual follow-up with low radiation chest CT.  Mild basilar atelectasis or scarring. Coronary artery calcification.  Atherosclerotic changes of the aorta. No acute osseous injury     No pulmonary embolism.  No dissection. Spiculated nodules in the right lower lobe measures 7.2 mm.  Necrotic irregular mass in the left lower lobe measuring 4.2 x 5.1 cm.  Findings are worrisome for malignancy.  Consider biopsy  or PET-CT for further evaluation. Extensive emphysema.  Recommend annual follow-up with low radiation chest CT Coronary artery calcification All CT scans   are performed using dose optimization techniques including the following: automated exposure control; adjustment of the mA and/or kV; use of iterative reconstruction technique.  Dose modulation was employed for ALARA by means of: Automated exposure control; adjustment of the mA and/or kV according to patient size (this includes techniques or standardized protocols for targeted exams where dose is matched to indication/reason for exam; i.e. extremities or head); and/or use of iterative reconstructive technique. Electronically signed by: Bentley Pierce Date:    10/25/2023 Time:    18:04    X-Ray Chest AP Portable    Result Date: 10/25/2023  EXAMINATION: XR CHEST AP PORTABLE CLINICAL HISTORY: weakness;. TECHNIQUE: Single frontal portable view of the chest was performed. COMPARISON: 02/08/2022 FINDINGS: Support devices: None The lungs are clear, with normal appearance of pulmonary vasculature and no pleural effusion or pneumothorax. The cardiac silhouette is normal in size. The hilar and mediastinal contours are unremarkable. Bones are intact.     No acute abnormality. Electronically signed by: Ephraim Quinonez Date:    10/25/2023 Time:    15:51    X-Ray Abdomen Flat And Erect    Result Date: 10/25/2023  EXAMINATION: XR ABDOMEN FLAT AND ERECT CLINICAL HISTORY: Constipation, unspecified TECHNIQUE: Flat and erect AP views of the abdomen were performed. COMPARISON: None FINDINGS: Large volume stool throughout the colon.  6.2 cm transverse diameter developing rectal fecal impaction.  No free air.  No evidence of obstruction or ileus.  No radiopaque foreign body, or abnormal calcification.  Osseous structures unremarkable.  Advanced atherosclerotic calcification.     Large volume stool throughout the colon. 6.2 cm transverse diameter developing rectal fecal impaction.  Electronically signed by: Ephraim Quinonez Date:    10/25/2023 Time:    15:42      EKG    MICROBIOLOGY    MDM     Amount and/or Complexity of Data Reviewed  Clinical lab tests: reviewed  Tests in the radiology section of CPT®: reviewed  Tests in the medicine section of CPT®: reviewed  Discussion of test results with the performing providers: yes  Decide to obtain previous medical records or to obtain history from someone other than the patient: yes  Obtain history from someone other than the patient: yes  Review and summarize past medical records: yes  Discuss the patient with other providers: yes  Independent visualization of images, tracings, or specimens: yes          Assessment/Plan:     * Sepsis  This patient does have evidence of infective focus  My overall impression is sepsis.  Source: Unknown  Antibiotics given-   Antibiotics (72h ago, onward)      None          Latest lactate reviewed-  Recent Labs   Lab 10/25/23  1625 10/25/23  2030 10/25/23  2144   LACTATE 2.5* 7.7* 6.6*       Fluid challenge Other- Patient to receive 1 liter volume other than 30cc/kg due to Congestive Heart Failure     Post- resuscitation assessment No - Post resuscitation assessment not needed       Will Not start Pressors- Levophed for MAP of 65  Source control achieved by: Abx, ivf,     Constipation  Received enema in ED. No BM as of yet.  Plan:  -monitor for BM  -stool softeners        Chronic anemia  Unknown baseline without evidence of active bleeding noted.   Recent Labs   Lab 10/25/23  1509   HGB 8.6*   HCT 27.3*   MCV 93   MCHC 31.5*   RDW 17.1*        Plan:  -Monitor H/H and plts  -Type and screen, transfuse as needed   -Continue home medications   -Hold antiplatelets/anticoagulants in setting of active bleeding/pending surgical intervention           Essential hypertension  Currently normotensive.BP usually well controlled per patient with home medications.  Plan:  -Optimize pain control   -Continue home medications  (lasix), titrate as needed   -Monitor BP  -Low salt/cardiac diet when not NPO  -IV hydralazine prn for SBP>160 or DBP>90           Chronic heart failure with preserved ejection fraction  Patient is identified as having unspecified heart failure that is Chronic. CHF is currently controlled. Latest ECHO performed and demonstrates- Results for orders placed during the hospital encounter of 08/10/23    Echo    Interpretation Summary    Left Ventricle: There is low normal systolic function with a visually estimated ejection fraction of 50 - 55%. Grade I diastolic dysfunction.    Right Ventricle: Normal right ventricular cavity size. Wall thickness is normal. Right ventricle wall motion  is normal. Systolic function is normal.    Aortic Valve: The aortic valve is a trileaflet valve. There is moderate aortic valve sclerosis. There is moderate to severe stenosis. Aortic valve area by VTI is 0.83 cm². Aortic valve peak velocity is 3.62 m/s. Mean gradient is 36 mmHg. The dimensionless index is 0.27. There is mild aortic regurgitation with a centrally directed jet.    Mitral Valve: There is mild bileaflet sclerosis. There is moderate to severe stenosis. The mean pressure gradient across the mitral valve is 10 mmHg at a heart rate of  bpm. There is mild regurgitation with a centrally directed jet.    Tricuspid Valve: There is normal leaflet mobility. There is mild regurgitation with a centrally directed jet.  . Continue ACE/ARB Furosemide and monitor clinical status closely. Monitor on telemetry. Patient is off CHF pathway.  Monitor strict Is&Os and daily weights.  Place on fluid restriction of 2 L. Continue to stress to patient importance of self efficacy and  on diet for CHF. Last BNP reviewed- and noted below   Recent Labs   Lab 10/25/23  1509   *   .            COPD (chronic obstructive pulmonary disease)  Patient's COPD is controlled currently.  Patient is currently off COPD Pathway. Continue scheduled  "inhalers duonebs prn, Steroids, Antibiotics and Supplemental oxygen and monitor respiratory status closely.         Other hyperlipidemia  Patient is chronically on statin.will continue for now. Last Lipid Panel: No results found for: "CHOL", "HDL", "LDLCALC", "TRIG", "CHOLHDL"  Plan:  -Continue home medication  -low fat/low calorie diet        Rheumatoid arthritis  Voices no complaints. Compliant with home meds.  Plan:  -continue home meds      Type 2 diabetes mellitus  Patient's FSGs are uncontrolled due to hyperglycemia on current medication regimen.  Last A1c reviewed-   Lab Results   Component Value Date    HGBA1C 7.5 (H) 09/02/2021     Most recent fingerstick glucose reviewed- No results for input(s): "POCTGLUCOSE" in the last 24 hours.  Current correctional scale  Low  Maintain anti-hyperglycemic dose as follows-   Antihyperglycemics (From admission, onward)      None          Most recent A1c measuring   Hemoglobin A1C   Date Value Ref Range Status   09/02/2021 7.5 (H) <=6.5 % Final      Plan:  -SSI  -Accu-checks   -Hypoglycemic protocol   -Hold any oral antihyperglycemics while inpatient         Gastroesophageal reflux disease  Chronic. Stable. Currently asymptomatic. Home medications include PPI/Antacids as needed.  Plan:  -Continue PPI/Antacids as needed         VTE Risk Mitigation (From admission, onward)           Ordered     Reason for No Pharmacological VTE Prophylaxis  Once        Question:  Reasons:  Answer:  Physician Provided (leave comment)    10/25/23 1936     IP VTE HIGH RISK PATIENT  Once         10/25/23 1936     Place sequential compression device  Until discontinued         10/25/23 1936                     //Core Measures   -DVT proph: SCDs, low H/H  -Code status Full    -Surrogate:daughter      Components of this note were documented using a voice recognition system and are subject to errors not corrected at the time the document was proof read. Please contact the author for any " clarifications.     Dejuan Maldonado NP  Department of Hospital Medicine  [facility] - Telemetry ([facility])

## 2023-10-26 NOTE — ASSESSMENT & PLAN NOTE
Currently normotensive.BP usually well controlled per patient with home medications.  Plan:  -Optimize pain control   -Continue home medications (lasix, losartan, norvasc), titrate as needed   -Monitor BP  -Low salt/cardiac diet when not NPO  -IV hydralazine prn for SBP>160 or DBP>90

## 2023-10-26 NOTE — ASSESSMENT & PLAN NOTE
This patient does have evidence of infective focus  My overall impression is sepsis.  Source: Unknown  Antibiotics given-   Antibiotics (72h ago, onward)    None        Latest lactate reviewed-  Recent Labs   Lab 10/25/23  1625 10/25/23  2030 10/25/23  2144   LACTATE 2.5* 7.7* 6.6*       Fluid challenge Other- Patient to receive 1 liter volume other than 30cc/kg due to Congestive Heart Failure     Post- resuscitation assessment No - Post resuscitation assessment not needed       Will Not start Pressors- Levophed for MAP of 65  Source control achieved by: Abx, ivf,

## 2023-10-26 NOTE — PLAN OF CARE
Problem: Bleeding (Sepsis/Septic Shock)  Goal: Absence of Bleeding  Outcome: Ongoing, Progressing  Intervention: Monitor and Manage Bleeding  Flowsheets (Taken 10/26/2023 5932)  Bleeding Precautions:   monitored for signs of bleeding   blood pressure closely monitored  Bleeding Management: blood products administered     Problem: Infection Progression (Sepsis/Septic Shock)  Goal: Absence of Infection Signs and Symptoms  Outcome: Ongoing, Progressing  Intervention: Initiate Sepsis Management  Flowsheets (Taken 10/26/2023 8038)  Infection Management: (IV ABX CONTINUED) other (see comments)

## 2023-10-26 NOTE — ASSESSMENT & PLAN NOTE
Unknown baseline without evidence of active bleeding noted.   Recent Labs   Lab 10/25/23  1509   HGB 8.6*   HCT 27.3*   MCV 93   MCHC 31.5*   RDW 17.1*        Plan:  -Monitor H/H and plts  -Type and screen, transfuse as needed   -Continue home medications   -Hold antiplatelets/anticoagulants in setting of active bleeding/pending surgical intervention

## 2023-10-26 NOTE — ASSESSMENT & PLAN NOTE
Received enema in ED. No BM as of yet.  Plan:  -monitor for BM  -stool softeners    Large bowel movement with bright red blood  Hb/hct downtrending  Blood transfusion stat  GI bleed consult

## 2023-10-26 NOTE — ASSESSMENT & PLAN NOTE
Patient is identified as having unspecified heart failure that is Chronic. CHF is currently controlled. Latest ECHO performed and demonstrates- Results for orders placed during the hospital encounter of 08/10/23    Echo    Interpretation Summary    Left Ventricle: There is low normal systolic function with a visually estimated ejection fraction of 50 - 55%. Grade I diastolic dysfunction.    Right Ventricle: Normal right ventricular cavity size. Wall thickness is normal. Right ventricle wall motion  is normal. Systolic function is normal.    Aortic Valve: The aortic valve is a trileaflet valve. There is moderate aortic valve sclerosis. There is moderate to severe stenosis. Aortic valve area by VTI is 0.83 cm². Aortic valve peak velocity is 3.62 m/s. Mean gradient is 36 mmHg. The dimensionless index is 0.27. There is mild aortic regurgitation with a centrally directed jet.    Mitral Valve: There is mild bileaflet sclerosis. There is moderate to severe stenosis. The mean pressure gradient across the mitral valve is 10 mmHg at a heart rate of  bpm. There is mild regurgitation with a centrally directed jet.    Tricuspid Valve: There is normal leaflet mobility. There is mild regurgitation with a centrally directed jet.  . Continue ACE/ARB Furosemide and monitor clinical status closely. Monitor on telemetry. Patient is off CHF pathway.  Monitor strict Is&Os and daily weights.  Place on fluid restriction of 2 L. Continue to stress to patient importance of self efficacy and  on diet for CHF. Last BNP reviewed- and noted below   Recent Labs   Lab 10/25/23  1509   *   .

## 2023-10-26 NOTE — ASSESSMENT & PLAN NOTE
Chronic. Stable. Currently asymptomatic. Home medications include PPI/Antacids as needed.  Plan:  -Continue PPI/Antacids as needed     Intravenous proton pump inhibitor

## 2023-10-26 NOTE — ASSESSMENT & PLAN NOTE
"Patient's FSGs are uncontrolled due to hyperglycemia on current medication regimen.  Last A1c reviewed-   Lab Results   Component Value Date    HGBA1C 7.5 (H) 09/02/2021     Most recent fingerstick glucose reviewed- No results for input(s): "POCTGLUCOSE" in the last 24 hours.  Current correctional scale  Low  Maintain anti-hyperglycemic dose as follows-   Antihyperglycemics (From admission, onward)    None        Most recent A1c measuring   Hemoglobin A1C   Date Value Ref Range Status   09/02/2021 7.5 (H) <=6.5 % Final      Plan:  -SSI  -Accu-checks   -Hypoglycemic protocol   -Hold any oral antihyperglycemics while inpatient     Controlled  Will relax normotensive/normoglycemic range in this geriatric population        "

## 2023-10-26 NOTE — HPI
The patient presented to the ER for constipation and SOB. He reported he had not had a BM in three days. He had tried several OTC medications without relief. He was also manually disimpacting himself at home. AXR showed a large volume of stool througout the colon with developing fecal impaction and transverse colon 6.2 cm. He was given a brown bomb enema with improvement per patient.     We have been consulted for hematochezia and drop in Hgb. The patient has a history of chronic anemia followed by Hematology. He received a blood transfusion two weeks ago. Home medications include ASA, MTX, Humira and PPI. No anticoagulation. Hgb yesterday was 8.6 and today is 5.0. Two units of blood now ordered. , INR 1.4, BUN 33 and creatinine 1.4. lactate 5.9, Troponin 0.129, . He is tachycardiac and BP is on the lower end. He is on 3L NC at home.  He is receiving IV Protonix bid, and was started on Zosyn and Flagyl. He lethargic and working to breath. He is able to answer yes/no questions. He says he had a colonoscopy in the past, but no additional information. Additional history was obtained from the medical record and medical staff.

## 2023-10-26 NOTE — CARE UPDATE
Daughter provides collateral    Patient has elected DNR code status  Patient recently lost wife approximately one month ago  For several years, patient has declined physically and mentally  Patient has required multiple blood transfusions q 5 weeks from q 2 months  Follows hematology  Anemia due to chronic disease     Nuclear medicine scan negative for GI bleed   Gastroenterology following and possibly pursuing cscope vs flex sigmoidoscopy  Receiving 2 units blood transfusion    Discussed comfort focused care at home, which is in alignment with patient's wishes thus far    Will continue treatment course and revisit comfort focused care tomorrow    Ct with necrotic irregular mass discussed with daughter  Continue treating for infectious etiology, pneumonia vs abdomen       Critical care time: 36-40 minutes. Critical care time was exclusive of separately billable procedures and treating other patients. Critical care was time spent personally by me on the following activities: review of chart, review of nurse's notes, review of consultant notes, discussion with consultant(s) and nurse(s), evaluation of patient's response to treatment, examination of patient, obtaining history from patient or surrogate(s), ordering treatments, ordering and reviewing diagnostic tests, and communication with patient and/or patient's surrogate(s) about patient's condition and treatment plan.

## 2023-10-26 NOTE — ASSESSMENT & PLAN NOTE
Patient is identified as having unspecified heart failure that is Chronic. CHF is currently controlled. Latest ECHO performed and demonstrates- Results for orders placed during the hospital encounter of 08/10/23    Echo    Interpretation Summary    Left Ventricle: There is low normal systolic function with a visually estimated ejection fraction of 50 - 55%. Grade I diastolic dysfunction.    Right Ventricle: Normal right ventricular cavity size. Wall thickness is normal. Right ventricle wall motion  is normal. Systolic function is normal.    Aortic Valve: The aortic valve is a trileaflet valve. There is moderate aortic valve sclerosis. There is moderate to severe stenosis. Aortic valve area by VTI is 0.83 cm². Aortic valve peak velocity is 3.62 m/s. Mean gradient is 36 mmHg. The dimensionless index is 0.27. There is mild aortic regurgitation with a centrally directed jet.    Mitral Valve: There is mild bileaflet sclerosis. There is moderate to severe stenosis. The mean pressure gradient across the mitral valve is 10 mmHg at a heart rate of  bpm. There is mild regurgitation with a centrally directed jet.    Tricuspid Valve: There is normal leaflet mobility. There is mild regurgitation with a centrally directed jet.  . Continue ACE/ARB Furosemide and monitor clinical status closely. Monitor on telemetry. Patient is off CHF pathway.  Monitor strict Is&Os and daily weights.  Place on fluid restriction of 2 L. Continue to stress to patient importance of self efficacy and  on diet for CHF. Last BNP reviewed- and noted below   Recent Labs   Lab 10/25/23  1509   *   .  Gentle fluids  Bolus 500cc

## 2023-10-26 NOTE — SUBJECTIVE & OBJECTIVE
Past Medical History:   Diagnosis Date    Aortic stenosis     Chronic anemia     COPD (chronic obstructive pulmonary disease)     Hypertension     Rheumatoid arthritis        History reviewed. No pertinent surgical history.    Review of patient's allergies indicates:  No Known Allergies  Family History       Problem Relation (Age of Onset)    No Known Problems Mother, Father          Tobacco Use    Smoking status: Never    Smokeless tobacco: Never   Substance and Sexual Activity    Alcohol use: Never    Drug use: Never    Sexual activity: Not Currently     Review of Systems   Constitutional:  Negative for fever.   HENT:  Negative for hearing loss.    Eyes:  Negative for visual disturbance.   Respiratory:  Positive for shortness of breath. Negative for cough.    Cardiovascular:  Negative for chest pain and palpitations.   Gastrointestinal:         As per HPI.   Genitourinary:  Negative for difficulty urinating, dysuria, frequency and hematuria.   Neurological:  Positive for weakness. Negative for seizures, syncope, numbness and headaches.     Objective:     Vital Signs (Most Recent):  Temp: 98 °F (36.7 °C) (10/26/23 0507)  Pulse: (!) 113 (10/26/23 0519)  Resp: 20 (10/26/23 0519)  BP: (!) 104/57 (10/26/23 0507)  SpO2: 100 % (10/26/23 0809) Vital Signs (24h Range):  Temp:  [98 °F (36.7 °C)-98.4 °F (36.9 °C)] 98 °F (36.7 °C)  Pulse:  [108-117] 113  Resp:  [16-20] 20  SpO2:  [95 %-100 %] 100 %  BP: (103-121)/(56-66) 104/57     Weight: 63.1 kg (139 lb 1.8 oz) (10/26/23 0015)  Body mass index is 19.4 kg/m².    No intake or output data in the 24 hours ending 10/26/23 0834    Lines/Drains/Airways       Peripheral Intravenous Line  Duration                  Peripheral IV - Single Lumen 10/25/23 1659 20 G Left Antecubital <1 day                     Physical Exam  Constitutional:       Appearance: He is ill-appearing.   HENT:      Head: Normocephalic and atraumatic.   Eyes:      Extraocular Movements: Extraocular movements  intact.   Cardiovascular:      Rate and Rhythm: Regular rhythm. Tachycardia present.      Heart sounds: Normal heart sounds. No murmur heard.  Pulmonary:      Effort: Accessory muscle usage present.      Breath sounds: Rhonchi present.   Abdominal:      General: Bowel sounds are normal. There is no distension.      Palpations: Abdomen is soft. There is no mass.      Tenderness: There is no abdominal tenderness.   Musculoskeletal:      Cervical back: Normal range of motion and neck supple.      Right lower leg: No edema.      Left lower leg: No edema.   Skin:     General: Skin is warm and dry.      Findings: No rash.   Neurological:      Mental Status: He is oriented to person, place, and time and easily aroused. He is lethargic.      Cranial Nerves: No cranial nerve deficit.   Psychiatric:         Behavior: Behavior normal.          Significant Labs:  CBC:   Recent Labs   Lab 10/25/23  1509 10/26/23  0537   WBC 15.94* 15.82*   HGB 8.6* 5.0*   HCT 27.3* 16.0*    283     CMP:   Recent Labs   Lab 10/25/23  1509   *   CALCIUM 9.2   ALBUMIN 2.8*   PROT 6.6      K 3.8   CO2 23      BUN 33*   CREATININE 1.4   ALKPHOS 58   ALT 73*   AST 38   BILITOT 0.6     Coagulation:   Recent Labs   Lab 10/26/23  0752   INR 1.4*       Significant Imaging:  Imaging results within the past 24 hours have been reviewed.

## 2023-10-26 NOTE — ASSESSMENT & PLAN NOTE
Currently normotensive.BP usually well controlled per patient with home medications.  Plan:  -Optimize pain control   -Continue home medications (lasix, losartan, norvasc), titrate as needed   -Monitor BP  -Low salt/cardiac diet when not NPO  -IV hydralazine prn for SBP>160 or DBP>90       Hypotensive  Fluid bolus stat

## 2023-10-26 NOTE — SUBJECTIVE & OBJECTIVE
Past Medical History:   Diagnosis Date    Aortic stenosis     Chronic anemia     COPD (chronic obstructive pulmonary disease)     Hypertension     Rheumatoid arthritis        History reviewed. No pertinent surgical history.    Review of patient's allergies indicates:  No Known Allergies    No current facility-administered medications on file prior to encounter.     Current Outpatient Medications on File Prior to Encounter   Medication Sig    alogliptin (NESINA) 25 mg Tab Take 1 tablet by mouth once daily.    aspirin (ECOTRIN) 81 MG EC tablet Take 81 mg by mouth once daily.    atorvastatin (LIPITOR) 80 MG tablet Take 40 mg by mouth every evening.    calcium carbonate (OS-DANIA) 500 mg calcium (1,250 mg) tablet Take 1,250 mg by mouth once daily.    cetirizine (ZYRTEC) 10 MG tablet Take 10 mg by mouth once daily.    fluticasone-salmeterol diskus inhaler 250-50 mcg Inhale 1 puff into the lungs 2 (two) times daily. Controller    folic acid (FOLVITE) 1 MG tablet Take 1 mg by mouth once daily.    furosemide (LASIX) 40 MG tablet Take 1 tablet (40 mg total) by mouth once daily.    guaiFENesin (MUCINEX) 600 mg 12 hr tablet Take 1,200 mg by mouth every 12 (twelve) hours.    hydroxychloroquine (PLAQUENIL) 200 mg tablet Take by mouth once daily.    megestroL (MEGACE) 400 mg/10 mL (40 mg/mL) Susp Take 400 mg by mouth once daily.    methotrexate 2.5 MG Tab Take 25 mg by mouth every 7 days. (Mondays)    montelukast (SINGULAIR) 10 mg tablet Take 10 mg by mouth every evening.    omeprazole (PRILOSEC) 20 MG capsule Take 20 mg by mouth once daily.    predniSONE (DELTASONE) 5 MG tablet Take 5 mg by mouth once daily.    tamsulosin (FLOMAX) 0.4 mg Cap Take 0.4 mg by mouth once daily.    traMADoL (ULTRAM) 50 mg tablet Take 100 mg by mouth 2 (two) times daily as needed for Pain.    adalimumab (HUMIRA) 10 mg/0.2 mL SyKt Inject 10 mg into the skin every 14 (fourteen) days.    albuterol (ACCUNEB) 1.25 mg/3 mL Nebu Take 1.25 mg by nebulization  every 6 (six) hours as needed. Rescue    amLODIPine (NORVASC) 10 MG tablet Take 10 mg by mouth once daily.    losartan (COZAAR) 100 MG tablet Take 100 mg by mouth once daily.     Family History       Problem Relation (Age of Onset)    No Known Problems Mother, Father          Tobacco Use    Smoking status: Never    Smokeless tobacco: Never   Substance and Sexual Activity    Alcohol use: Never    Drug use: Never    Sexual activity: Not Currently     Review of Systems   Constitutional:  Positive for fatigue. Negative for chills, diaphoresis and fever.   Gastrointestinal:  Positive for constipation. Negative for abdominal distention, abdominal pain, blood in stool, diarrhea, nausea and vomiting.   Neurological:  Positive for weakness. Negative for syncope.   All other systems reviewed and are negative.    Objective:     Vital Signs (Most Recent):  Temp: 98.4 °F (36.9 °C) (10/26/23 0015)  Pulse: (!) 116 (10/26/23 0400)  Resp: 19 (10/26/23 0015)  BP: (!) 121/56 (10/26/23 0015)  SpO2: 100 % (10/26/23 0015) Vital Signs (24h Range):  Temp:  [98.1 °F (36.7 °C)-98.4 °F (36.9 °C)] 98.4 °F (36.9 °C)  Pulse:  [108-117] 116  Resp:  [16-20] 19  SpO2:  [95 %-100 %] 100 %  BP: (103-121)/(56-66) 121/56     Weight: 63.1 kg (139 lb 1.8 oz)  Body mass index is 19.4 kg/m².     Physical Exam  Vitals and nursing note reviewed.   Constitutional:       General: He is awake. He is not in acute distress.     Appearance: Normal appearance. He is well-developed and well-groomed. He is ill-appearing. He is not toxic-appearing or diaphoretic.   HENT:      Head: Normocephalic and atraumatic.   Eyes:      Extraocular Movements: Extraocular movements intact.      Conjunctiva/sclera: Conjunctivae normal.   Cardiovascular:      Rate and Rhythm: Normal rate and regular rhythm.      Pulses: Normal pulses.      Heart sounds: Normal heart sounds. No murmur heard.  Pulmonary:      Effort: Pulmonary effort is normal.      Breath sounds: Normal breath sounds.    Abdominal:      General: Bowel sounds are normal.      Palpations: Abdomen is soft.      Tenderness: There is no abdominal tenderness.   Musculoskeletal:      Cervical back: Normal range of motion and neck supple.      Comments: 5/5 strength throughout   Skin:     General: Skin is warm and dry.      Capillary Refill: Capillary refill takes less than 2 seconds.   Neurological:      General: No focal deficit present.      Mental Status: He is alert and oriented to person, place, and time. Mental status is at baseline.      GCS: GCS eye subscore is 4. GCS verbal subscore is 5. GCS motor subscore is 6.      Cranial Nerves: Cranial nerves 2-12 are intact.      Sensory: Sensation is intact.      Motor: Motor function is intact.   Psychiatric:         Mood and Affect: Mood normal.         Behavior: Behavior normal. Behavior is cooperative.           LABS:  Recent Results (from the past 24 hour(s))   HIV 1/2 Ag/Ab (4th Gen)    Collection Time: 10/25/23  3:09 PM   Result Value Ref Range    HIV 1/2 Ag/Ab Negative Negative   Hepatitis C Antibody    Collection Time: 10/25/23  3:09 PM   Result Value Ref Range    Hepatitis C Ab Negative Negative   HCV Virus Hold Specimen    Collection Time: 10/25/23  3:09 PM   Result Value Ref Range    HEP C Virus Hold Specimen Hold for HCV sendout    CBC Auto Differential    Collection Time: 10/25/23  3:09 PM   Result Value Ref Range    WBC 15.94 (H) 3.90 - 12.70 K/uL    RBC 2.94 (L) 4.60 - 6.20 M/uL    Hemoglobin 8.6 (L) 14.0 - 18.0 g/dL    Hematocrit 27.3 (L) 40.0 - 54.0 %    MCV 93 82 - 98 fL    MCH 29.3 27.0 - 31.0 pg    MCHC 31.5 (L) 32.0 - 36.0 g/dL    RDW 17.1 (H) 11.5 - 14.5 %    Platelets 278 150 - 450 K/uL    MPV 9.1 (L) 9.2 - 12.9 fL    Immature Granulocytes 3.3 (H) 0.0 - 0.5 %    Gran # (ANC) 13.7 (H) 1.8 - 7.7 K/uL    Immature Grans (Abs) 0.52 (H) 0.00 - 0.04 K/uL    Lymph # 0.9 (L) 1.0 - 4.8 K/uL    Mono # 0.8 0.3 - 1.0 K/uL    Eos # 0.0 0.0 - 0.5 K/uL    Baso # 0.03 0.00 - 0.20  K/uL    nRBC 0 0 /100 WBC    Gran % 86.0 (H) 38.0 - 73.0 %    Lymph % 5.4 (L) 18.0 - 48.0 %    Mono % 5.1 4.0 - 15.0 %    Eosinophil % 0.0 0.0 - 8.0 %    Basophil % 0.2 0.0 - 1.9 %    Differential Method Automated    Comprehensive Metabolic Panel    Collection Time: 10/25/23  3:09 PM   Result Value Ref Range    Sodium 139 136 - 145 mmol/L    Potassium 3.8 3.5 - 5.1 mmol/L    Chloride 100 95 - 110 mmol/L    CO2 23 23 - 29 mmol/L    Glucose 131 (H) 70 - 110 mg/dL    BUN 33 (H) 8 - 23 mg/dL    Creatinine 1.4 0.5 - 1.4 mg/dL    Calcium 9.2 8.7 - 10.5 mg/dL    Total Protein 6.6 6.0 - 8.4 g/dL    Albumin 2.8 (L) 3.5 - 5.2 g/dL    Total Bilirubin 0.6 0.1 - 1.0 mg/dL    Alkaline Phosphatase 58 55 - 135 U/L    AST 38 10 - 40 U/L    ALT 73 (H) 10 - 44 U/L    eGFR 51 (A) >60 mL/min/1.73 m^2    Anion Gap 16 8 - 16 mmol/L   BNP    Collection Time: 10/25/23  3:09 PM   Result Value Ref Range     (H) 0 - 99 pg/mL   CK    Collection Time: 10/25/23  3:09 PM   Result Value Ref Range    CPK 57 20 - 200 U/L   Troponin I    Collection Time: 10/25/23  3:09 PM   Result Value Ref Range    Troponin I 0.080 (H) 0.000 - 0.026 ng/mL   Blood culture    Collection Time: 10/25/23  4:23 PM    Specimen: Peripheral, Antecubital, Left; Blood   Result Value Ref Range    Blood Culture, Routine No Growth to date    Blood culture    Collection Time: 10/25/23  4:25 PM    Specimen: Peripheral, Hand, Left; Blood   Result Value Ref Range    Blood Culture, Routine No Growth to date    Lactic acid, plasma    Collection Time: 10/25/23  4:25 PM   Result Value Ref Range    Lactate (Lactic Acid) 2.5 (H) 0.5 - 2.2 mmol/L   Urinalysis, Reflex to Urine Culture Urine, Clean Catch    Collection Time: 10/25/23  8:11 PM    Specimen: Urine   Result Value Ref Range    Specimen UA Urine, Clean Catch     Color, UA Yellow Yellow, Straw, Mary    Appearance, UA Clear Clear    pH, UA 7.0 5.0 - 8.0    Specific Gravity, UA >1.030 (A) 1.005 - 1.030    Protein, UA Trace (A)  Negative    Glucose, UA Negative Negative    Ketones, UA Negative Negative    Bilirubin (UA) Negative Negative    Occult Blood UA Negative Negative    Nitrite, UA Negative Negative    Urobilinogen, UA Negative <2.0 EU/dL    Leukocytes, UA Negative Negative   Lactic acid, plasma    Collection Time: 10/25/23  8:30 PM   Result Value Ref Range    Lactate (Lactic Acid) 7.7 (HH) 0.5 - 2.2 mmol/L   Troponin I    Collection Time: 10/25/23  8:30 PM   Result Value Ref Range    Troponin I 0.099 (H) 0.000 - 0.026 ng/mL   Lactic acid, plasma    Collection Time: 10/25/23  9:44 PM   Result Value Ref Range    Lactate (Lactic Acid) 6.6 (HH) 0.5 - 2.2 mmol/L       RADIOLOGY  CTA Chest Non-Coronary (PE Studies)    Result Date: 10/25/2023  EXAMINATION: CTA CHEST NON CORONARY (PE STUDIES) CLINICAL HISTORY: PE suspected, intermediate prob, neg D-dimer; TECHNIQUE: Low dose axial images, sagittal and coronal reformations were obtained from the thoracic inlet to the lung bases following the IV administration of 100 mL of Omnipaque 350.  Contrast timing was optimized to evaluate the pulmonary arteries.  MIP images were performed. COMPARISON: None FINDINGS: No evidence for pulmonary embolism.  No evidence for aortic dissection or aneurysm.  Cardiovascular structures have a normal non gated appearance. Spiculated nodules in the right lower lobe measures 7.2 mm. Necrotic irregular mass in the left lower lobe measuring 4.2 x 5.1 cm Extensive emphysema.  Recommend annual follow-up with low radiation chest CT.  Mild basilar atelectasis or scarring. Coronary artery calcification.  Atherosclerotic changes of the aorta. No acute osseous injury     No pulmonary embolism.  No dissection. Spiculated nodules in the right lower lobe measures 7.2 mm.  Necrotic irregular mass in the left lower lobe measuring 4.2 x 5.1 cm.  Findings are worrisome for malignancy.  Consider biopsy or PET-CT for further evaluation. Extensive emphysema.  Recommend annual  follow-up with low radiation chest CT Coronary artery calcification All CT scans   are performed using dose optimization techniques including the following: automated exposure control; adjustment of the mA and/or kV; use of iterative reconstruction technique.  Dose modulation was employed for ALARA by means of: Automated exposure control; adjustment of the mA and/or kV according to patient size (this includes techniques or standardized protocols for targeted exams where dose is matched to indication/reason for exam; i.e. extremities or head); and/or use of iterative reconstructive technique. Electronically signed by: Bentley Pierce Date:    10/25/2023 Time:    18:04    X-Ray Chest AP Portable    Result Date: 10/25/2023  EXAMINATION: XR CHEST AP PORTABLE CLINICAL HISTORY: weakness;. TECHNIQUE: Single frontal portable view of the chest was performed. COMPARISON: 02/08/2022 FINDINGS: Support devices: None The lungs are clear, with normal appearance of pulmonary vasculature and no pleural effusion or pneumothorax. The cardiac silhouette is normal in size. The hilar and mediastinal contours are unremarkable. Bones are intact.     No acute abnormality. Electronically signed by: Ephraim Quinonez Date:    10/25/2023 Time:    15:51    X-Ray Abdomen Flat And Erect    Result Date: 10/25/2023  EXAMINATION: XR ABDOMEN FLAT AND ERECT CLINICAL HISTORY: Constipation, unspecified TECHNIQUE: Flat and erect AP views of the abdomen were performed. COMPARISON: None FINDINGS: Large volume stool throughout the colon.  6.2 cm transverse diameter developing rectal fecal impaction.  No free air.  No evidence of obstruction or ileus.  No radiopaque foreign body, or abnormal calcification.  Osseous structures unremarkable.  Advanced atherosclerotic calcification.     Large volume stool throughout the colon. 6.2 cm transverse diameter developing rectal fecal impaction. Electronically signed by: Ephraim Quinonez Date:    10/25/2023  Time:    15:42      EKG    MICROBIOLOGY    MDM     Amount and/or Complexity of Data Reviewed  Clinical lab tests: reviewed  Tests in the radiology section of CPT®: reviewed  Tests in the medicine section of CPT®: reviewed  Discussion of test results with the performing providers: yes  Decide to obtain previous medical records or to obtain history from someone other than the patient: yes  Obtain history from someone other than the patient: yes  Review and summarize past medical records: yes  Discuss the patient with other providers: yes  Independent visualization of images, tracings, or specimens: yes

## 2023-10-26 NOTE — ASSESSMENT & PLAN NOTE
"Patient is chronically on statin.will continue for now. Last Lipid Panel: No results found for: "CHOL", "HDL", "LDLCALC", "TRIG", "CHOLHDL"  Plan:  -Continue home medication  -low fat/low calorie diet    Npo pending gastroenterology evaluation   "

## 2023-10-26 NOTE — ASSESSMENT & PLAN NOTE
Patient's COPD is controlled currently.  Patient is currently off COPD Pathway. Continue scheduled inhalers duonebs prn, Steroids, Antibiotics and Supplemental oxygen and monitor respiratory status closely.

## 2023-10-26 NOTE — HPI
Navdeep Sesay Jr. is a 78 y.o. male with a PMH  has a past medical history of Aortic stenosis, Chronic anemia, COPD (chronic obstructive pulmonary disease), Hypertension, and Rheumatoid arthritis.    Presented for constipation. Improved after enema but with bright red blood. Receives chronic transfusions outpatient for anemia. Workup unrevealing per step daughter but now requiring more frequent blood transfusions outpatient. Family reports black stools but also takes fe supplemental.     Patient received 2 units while on floor but hb/hct continued to downtrend with active melena. Imaging without GI bleed identified, both nuclear medicine tagged red blood cell and Cta GI bleed. Flexsig with no bleeding source found. Gastroenterology recommending video capsule endoscopy while inpatient. Vital signs stable after receiving 2 additional units of blood.    Hospital medicine consulted for assumption of care.

## 2023-10-26 NOTE — HOSPITAL COURSE
10/26 admitted for constipation. Large bowel movement with bright red blood. Hb/hct downtrending. Lactic acidosis. Complains of dyspnea, on 3L chronically. Ill appearing. Low threshold for transferring to icu.   10/27 more alert. Multiple bowel movements. Continues to complain of weakness and dyspnea. Gastroenterology following. Repeat hb/hct pending.    10/29  Downgraded yesterday from icu as vital signs stable status post flexsig per gastroenterology, as not stable for egd or cscope (tenuous respiratory status and congestive heart failure for sedation and debility to tolerate prep). No overt bleeding found. Hb/hct stable after receiving 4 units. Discussed gastroenterology recommendations with intravenous iron vs fe po supplemental, as latter can cause constipation. Constipation has resolved. Miralax TID recommended. After discussing with gastroenterology, ok to follow up outpatient for video capsule endoscopy if continues to experience GI bleed symptoms. Received empiric antibiotic(s). Patient follows hematology for chronic anemia.    Remains on baseline 2L supplemental oxygen. Endorses improvement but reports continued weakness. After discussing with patient and family, patient opting for trial of comfort focused care as extra layer of support given chronic medical conditions, same as patient's wife who passed away 3 weeks ago. Social consulted for referral.     CTA of lung findings discussed, severe emphysema and necrotic lung mass. Received empiric intravenous antibiotic(s). Will need to be stable before pursuing further workup and/or treatment.     On exam, no acute distress. Respiratory distress. Pale. AO3. Ill appearing. Family at bedside.    Patient seen and evaluated by me. Patient was determined to be suitable for discharge. Patient deemed stable for discharge to home with hospice.

## 2023-10-26 NOTE — ASSESSMENT & PLAN NOTE
This patient does have evidence of infective focus  My overall impression is sepsis.  Source: Unknown  Antibiotics given-   Antibiotics (72h ago, onward)    None        Latest lactate reviewed-  Recent Labs   Lab 10/25/23  2030 10/25/23  2144 10/26/23  0642   LACTATE 7.7* 6.6* 5.9*       Fluid challenge Other- Patient to receive 1 liter volume other than 30cc/kg due to Congestive Heart Failure     Post- resuscitation assessment No - Post resuscitation assessment not needed       Will Not start Pressors- Levophed for MAP of 65  Source control achieved by: Abx, ivf,     Abdominal source vs pneumonia  Elevated lactate  Low threshold for transferring to icu if no improvement with hypotension with bolus  Add antibiotic(s) to cover pneumonia and abdominal source  Received one time dose zosyn in emergency department

## 2023-10-26 NOTE — PROGRESS NOTES
[facility] Medicine  Progress Note    Patient Name: Navdeep Sesay Jr.  MRN: [id number]  Patient Class: IP- Inpatient   Admission Date: 10/25/2023  Length of Stay: 1 days  Attending Physician: Michele Vang MD  Primary Care Provider: Grac[facility] Primary Doctor        Subjective:     Principal Problem:Sepsis        HPI:  Navdeep Sesay Jr. is a 78 y.o. male with a PMH  has a past medical history of Aortic stenosis, Chronic anemia, COPD (chronic obstructive pulmonary disease), Hypertension, and Rheumatoid arthritis.  Presented to the ER for evaluation of constipation.  Patient reports he has been having difficulty having a bowel movement over the last 3 days.  No relief with MiraLax.  Patient reports removing stool from his rectum digitally due to severe constipation.  Patient also reports some shortness of breath on exertion.  Patient chronically wears home O2 at 3 liters/minute via nasal cannula.  Patient reports he had a blood transfusion 2 weeks ago secondary to his symptomatic anemia.  Denies any chest pain or any other symptoms at this time.    ER workup revealed leukocytosis of 15.94, H/H of 8.6/27.3, , , troponin 0.080, lactic acid 2.5 CTA of the chest negative for PE, flat and erect abdomen x-ray revealed large stool burden and possible fecal impaction, chest x-ray revealed no acute findings, and EKG revealing sinus tachycardia with PACs with a ventricular rate of 115 beats per minute, RBBB, QT/QTC of 358/495.  Patient received Mag citrate/glycerin enema, 1 L of normal saline and 4.5 g of Zosyn in ED. hospital Medicine consulted.  Patient in agreement with treatment plan.  Patient will be admitted under inpatient status.    PCP: Graciela Primary Doctor          Overview/Hospital Course:  7/31 admitted for constipation. Large bowel movement with bright red blood. Hb/hct downtrending. Lactic acidosis. Complains of dyspnea, on 3L chronically. Ill appearing. Low threshold for  transferring to icu.       Interval History: See hospital course for today    Review of Systems   Constitutional:  Negative for fever.   Respiratory:  Positive for shortness of breath.    Gastrointestinal:  Positive for constipation (improved). Negative for abdominal pain.   Psychiatric/Behavioral:  Positive for confusion. Negative for sleep disturbance.      Objective:     Vital Signs (Most Recent):  Temp: 98 °F (36.7 °C) (10/26/23 0507)  Pulse: (!) 113 (10/26/23 0519)  Resp: 20 (10/26/23 0519)  BP: (!) 104/57 (10/26/23 0507)  SpO2: 100 % (10/26/23 0519) Vital Signs (24h Range):  Temp:  [98 °F (36.7 °C)-98.4 °F (36.9 °C)] 98 °F (36.7 °C)  Pulse:  [108-117] 113  Resp:  [16-20] 20  SpO2:  [95 %-100 %] 100 %  BP: (103-121)/(56-66) 104/57     Weight: 63.1 kg (139 lb 1.8 oz)  Body mass index is 19.4 kg/m².  No intake or output data in the 24 hours ending 10/26/23 0745      Physical Exam  Vitals and nursing note reviewed.   Constitutional:       General: He is sleeping. He is not in acute distress.     Appearance: He is cachectic. He is ill-appearing. He is not toxic-appearing.      Interventions: Nasal cannula in place.   HENT:      Head: Normocephalic and atraumatic.   Cardiovascular:      Rate and Rhythm: Tachycardia present.   Pulmonary:      Effort: Tachypnea and respiratory distress present.   Abdominal:      General: There is distension.      Palpations: Abdomen is soft.      Tenderness: There is no abdominal tenderness.   Skin:     General: Skin is warm.      Capillary Refill: Capillary refill takes 2 to 3 seconds.      Coloration: Skin is pale.   Neurological:      Mental Status: He is easily aroused. He is lethargic and disoriented.      Motor: Weakness present.           Significant Labs: All pertinent labs within the past 24 hours have been reviewed.  CBC:   Recent Labs   Lab 10/25/23  1509 10/26/23  0537   WBC 15.94* 15.82*   HGB 8.6* 5.0*   HCT 27.3* 16.0*    283     CMP:   Recent Labs   Lab  10/25/23  1509      K 3.8      CO2 23   *   BUN 33*   CREATININE 1.4   CALCIUM 9.2   PROT 6.6   ALBUMIN 2.8*   BILITOT 0.6   ALKPHOS 58   AST 38   ALT 73*   ANIONGAP 16       Lactic Acid:   Recent Labs   Lab 10/25/23  2030 10/25/23  2144 10/26/23  0642   LACTATE 7.7* 6.6* 5.9*       Significant Imaging: I have reviewed all pertinent imaging results/findings within the past 24 hours.  CT: I have reviewed all pertinent results/findings within the past 24 hours and my personal findings are:  negative for pulmonary embolism; large stool burden by my interpretation      Assessment/Plan:      * Sepsis  This patient does have evidence of infective focus  My overall impression is sepsis.  Source: Unknown  Antibiotics given-   Antibiotics (72h ago, onward)    None        Latest lactate reviewed-  Recent Labs   Lab 10/25/23  2030 10/25/23  2144 10/26/23  0642   LACTATE 7.7* 6.6* 5.9*       Fluid challenge Other- Patient to receive 1 liter volume other than 30cc/kg due to Congestive Heart Failure     Post- resuscitation assessment No - Post resuscitation assessment not needed       Will Not start Pressors- Levophed for MAP of 65  Source control achieved by: Abx, ivf,     Abdominal source vs pneumonia  Elevated lactate  Low threshold for transferring to icu if no improvement with hypotension with bolus  Add antibiotic(s) to cover pneumonia and abdominal source  Received one time dose zosyn in emergency department     Gastroesophageal reflux disease  Chronic. Stable. Currently asymptomatic. Home medications include PPI/Antacids as needed.  Plan:  -Continue PPI/Antacids as needed     Intravenous proton pump inhibitor     Chronic anemia  Unknown baseline without evidence of active bleeding noted.   Recent Labs   Lab 10/25/23  1509 10/26/23  0537   HGB 8.6* 5.0*   HCT 27.3* 16.0*   MCV 93 95   MCHC 31.5* 31.3*   RDW 17.1* 17.4*    283     Plan:  -Monitor H/H and plts  -Type and screen, transfuse as needed    -Continue home medications   -Hold antiplatelets/anticoagulants in setting of active bleeding/pending surgical intervention       Blood transfusion  GI bleed   Gastroenterology consult      Constipation  Received enema in ED. No BM as of yet.  Plan:  -monitor for BM  -stool softeners    Large bowel movement with bright red blood  Hb/hct downtrending  Blood transfusion stat  GI bleed consult    Chronic heart failure with preserved ejection fraction  Patient is identified as having unspecified heart failure that is Chronic. CHF is currently controlled. Latest ECHO performed and demonstrates- Results for orders placed during the hospital encounter of 08/10/23    Echo    Interpretation Summary    Left Ventricle: There is low normal systolic function with a visually estimated ejection fraction of 50 - 55%. Grade I diastolic dysfunction.    Right Ventricle: Normal right ventricular cavity size. Wall thickness is normal. Right ventricle wall motion  is normal. Systolic function is normal.    Aortic Valve: The aortic valve is a trileaflet valve. There is moderate aortic valve sclerosis. There is moderate to severe stenosis. Aortic valve area by VTI is 0.83 cm². Aortic valve peak velocity is 3.62 m/s. Mean gradient is 36 mmHg. The dimensionless index is 0.27. There is mild aortic regurgitation with a centrally directed jet.    Mitral Valve: There is mild bileaflet sclerosis. There is moderate to severe stenosis. The mean pressure gradient across the mitral valve is 10 mmHg at a heart rate of  bpm. There is mild regurgitation with a centrally directed jet.    Tricuspid Valve: There is normal leaflet mobility. There is mild regurgitation with a centrally directed jet.  . Continue ACE/ARB Furosemide and monitor clinical status closely. Monitor on telemetry. Patient is off CHF pathway.  Monitor strict Is&Os and daily weights.  Place on fluid restriction of 2 L. Continue to stress to patient importance of self efficacy and  " on diet for CHF. Last BNP reviewed- and noted below   Recent Labs   Lab 10/25/23  1509   *   .  Gentle fluids  Bolus 500cc      COPD (chronic obstructive pulmonary disease)  Patient's COPD is controlled currently.  Patient is currently off COPD Pathway. Continue scheduled inhalers duonebs prn, Steroids, Antibiotics and Supplemental oxygen and monitor respiratory status closely.     abg stat  cta negative for pulmonary embolism  Significant emphysema   chronically on 3L  Discontinue steroids pending procalcitonin and abg      Type 2 diabetes mellitus  Patient's FSGs are uncontrolled due to hyperglycemia on current medication regimen.  Last A1c reviewed-   Lab Results   Component Value Date    HGBA1C 7.5 (H) 09/02/2021     Most recent fingerstick glucose reviewed- No results for input(s): "POCTGLUCOSE" in the last 24 hours.  Current correctional scale  Low  Maintain anti-hyperglycemic dose as follows-   Antihyperglycemics (From admission, onward)    None        Most recent A1c measuring   Hemoglobin A1C   Date Value Ref Range Status   09/02/2021 7.5 (H) <=6.5 % Final      Plan:  -SSI  -Accu-checks   -Hypoglycemic protocol   -Hold any oral antihyperglycemics while inpatient     Controlled  Will relax normotensive/normoglycemic range in this geriatric population          Rheumatoid arthritis  Voices no complaints. Compliant with home meds.  Plan:  -continue home meds      Other hyperlipidemia  Patient is chronically on statin.will continue for now. Last Lipid Panel: No results found for: "CHOL", "HDL", "LDLCALC", "TRIG", "CHOLHDL"  Plan:  -Continue home medication  -low fat/low calorie diet    Npo pending gastroenterology evaluation     Essential hypertension  Currently normotensive.BP usually well controlled per patient with home medications.  Plan:  -Optimize pain control   -Continue home medications (lasix, losartan, norvasc), titrate as needed   -Monitor BP  -Low salt/cardiac diet when not NPO  -IV " hydralazine prn for SBP>160 or DBP>90       Hypotensive  Fluid bolus stat        VTE Risk Mitigation (From admission, onward)         Ordered     Reason for No Pharmacological VTE Prophylaxis  Once        Question:  Reasons:  Answer:  Physician Provided (leave comment)    10/25/23 1936     IP VTE HIGH RISK PATIENT  Once         10/25/23 1936     Place sequential compression device  Until discontinued         10/25/23 1936                Discharge Planning   EDSON:      Code Status: Full Code   Is the patient medically ready for discharge?:     Reason for patient still in hospital (select all that apply): Patient new problem, Patient trending condition, Laboratory test, Treatment, Imaging, Consult recommendations and Pending disposition                     Michele Vang MD  Department of Hospital Medicine   [facility] ([facility])

## 2023-10-26 NOTE — NURSING
Received on unit awake and alert, resp even and unlabored with O2@3L/NC, c/o sob when lying flat. Assessment per flowsheet. Denies any pain or discomfort at this time. Plan of care discussed, pt verbalized understanding. Bed low, call light within reach. Will continue to monitor.

## 2023-10-26 NOTE — ASSESSMENT & PLAN NOTE
"Patient is chronically on statin.will continue for now. Last Lipid Panel: No results found for: "CHOL", "HDL", "LDLCALC", "TRIG", "CHOLHDL"  Plan:  -Continue home medication  -low fat/low calorie diet      "

## 2023-10-26 NOTE — ASSESSMENT & PLAN NOTE
Acute on chronic anemia in the setting of overt bleeding.   Given the recent constipation, rectal ulcer would be high on the differential.  Recommend STAT bleeding imaging to assess for location and possibility of IR treatment.   Agree with transfusion and IV Protonix.   Continue to monitor H/H.   Keep NPO.

## 2023-10-26 NOTE — PLAN OF CARE
[facility] - Telemetry (Hospital)  Initial Discharge Assessment       Primary Care Provider: [facility]    Admission Diagnosis: Shortness of breath [R06.02]  Fecal impaction [K56.41]  Weakness [R53.1]  Constipation [K59.00]  Chest pain [R07.9]  Right lower lobe lung mass [R91.8]  Mass of lower lobe of left lung [R91.8]  Pneumonia of both lower lobes due to infectious organism [J18.9]    Admission Date: 10/25/2023  Expected Discharge Date:     Transition of Care Barriers: None    Payor: [employer] / Plan: [facility] [employer] / Product Type: Government /     Extended Emergency Contact Information  Primary Emergency Contact: Kelly Hornebie  Mobile Phone: [phone]  Relation: Daughter    Discharge Plan A: Other (TBD)         [facility] [place] [id number] [place] [id number] [address] [address]  Phone: [phone] Fax: [phone]      Initial Assessment (most recent)       Adult Discharge Assessment - 10/26/23 1118          Discharge Assessment    Assessment Type Discharge Planning Assessment     Confirmed/corrected address, phone number and insurance Yes     Confirmed Demographics Correct on Facesheet     Source of Information family     Communicated EDSON with patient/caregiver Date not available/Unable to determine     Reason For Admission Sepsis     People in Home child(annmarie), adult     Facility Arrived From: home     Do you expect to return to your current living situation? Yes     Do you have help at home or someone to help you manage your care at home? Yes     Who are your caregiver(s) and their phone number(s)? Analiliahannah Horne - daughter     Prior to hospitilization cognitive status: Alert/Oriented     Current cognitive status: Unable to Assess     Walking or Climbing Stairs ambulation difficulty, requires equipment     Mobility Management rollator     Dressing/Bathing bathing difficulty, requires equipment     Dressing/Bathing Management shower chair      Home Accessibility wheelchair accessible     Equipment Currently Used at Home rollator;oxygen     Readmission within 30 days? No     Patient currently being followed by outpatient case management? No     Do you currently have service(s) that help you manage your care at home? No     Do you take prescription medications? Yes     Do you have prescription coverage? Yes     Coverage [employer]     Do you have any problems affording any of your prescribed medications? No     Is the patient taking medications as prescribed? yes     Who is going to help you get home at discharge? Analilia Owenslan - daughter     How do you get to doctors appointments? family or friend will provide     Are you on dialysis? No     Do you take coumadin? No     DME Needed Upon Discharge  none     Discharge Plan discussed with: Patient     Transition of Care Barriers None     Discharge Plan A Other   TBD                  Anticipated DC dispo: TBD  Prior Level of Function: dependent with ADLs, Patient's daughter assists  People in home: Analilia Horne - daughter    Comments:  SW spoke to Patient's daughter, Analilia, over the phone to introduce role and discuss discharge planning. Patient's daughter will be help at home and can provide transport at time of discharge. CM discharge needs depends on hospital progress. SW will continue following to assist with other needs.

## 2023-10-26 NOTE — ASSESSMENT & PLAN NOTE
Patient's COPD is controlled currently.  Patient is currently off COPD Pathway. Continue scheduled inhalers duonebs prn, Steroids, Antibiotics and Supplemental oxygen and monitor respiratory status closely.     abg stat  cta negative for pulmonary embolism  Significant emphysema   chronically on 3L  Discontinue steroids pending procalcitonin and abg

## 2023-10-27 LAB
BLD PROD TYP BPU: NORMAL
BLD PROD TYP BPU: NORMAL
BLOOD UNIT EXPIRATION DATE: NORMAL
BLOOD UNIT EXPIRATION DATE: NORMAL
BLOOD UNIT TYPE CODE: 5100
BLOOD UNIT TYPE CODE: 5100
BLOOD UNIT TYPE: NORMAL
BLOOD UNIT TYPE: NORMAL
CODING SYSTEM: NORMAL
CODING SYSTEM: NORMAL
CROSSMATCH INTERPRETATION: NORMAL
CROSSMATCH INTERPRETATION: NORMAL
DISPENSE STATUS: NORMAL
DISPENSE STATUS: NORMAL
HCT VFR BLD AUTO: 21.3 % (ref 40–54)
HCT VFR BLD AUTO: 26.7 % (ref 40–54)
HGB BLD-MCNC: 6.9 G/DL (ref 14–18)
HGB BLD-MCNC: 8.9 G/DL (ref 14–18)
LACTATE SERPL-SCNC: 2.5 MMOL/L (ref 0.5–2.2)
NUM UNITS TRANS PACKED RBC: NORMAL
NUM UNITS TRANS PACKED RBC: NORMAL

## 2023-10-27 PROCEDURE — 25000003 PHARM REV CODE 250: Performed by: NURSE PRACTITIONER

## 2023-10-27 PROCEDURE — 63600175 PHARM REV CODE 636 W HCPCS: Performed by: FAMILY MEDICINE

## 2023-10-27 PROCEDURE — 36430 TRANSFUSION BLD/BLD COMPNT: CPT

## 2023-10-27 PROCEDURE — C9113 INJ PANTOPRAZOLE SODIUM, VIA: HCPCS | Performed by: FAMILY MEDICINE

## 2023-10-27 PROCEDURE — 25000003 PHARM REV CODE 250: Performed by: INTERNAL MEDICINE

## 2023-10-27 PROCEDURE — 25500020 PHARM REV CODE 255: Performed by: FAMILY MEDICINE

## 2023-10-27 PROCEDURE — 36415 COLL VENOUS BLD VENIPUNCTURE: CPT | Performed by: FAMILY MEDICINE

## 2023-10-27 PROCEDURE — 63600175 PHARM REV CODE 636 W HCPCS: Performed by: INTERNAL MEDICINE

## 2023-10-27 PROCEDURE — 86920 COMPATIBILITY TEST SPIN: CPT | Performed by: FAMILY MEDICINE

## 2023-10-27 PROCEDURE — 85014 HEMATOCRIT: CPT | Performed by: FAMILY MEDICINE

## 2023-10-27 PROCEDURE — 85018 HEMOGLOBIN: CPT | Mod: 91 | Performed by: INTERNAL MEDICINE

## 2023-10-27 PROCEDURE — S0179 MEGESTROL 20 MG: HCPCS | Performed by: NURSE PRACTITIONER

## 2023-10-27 PROCEDURE — 25000003 PHARM REV CODE 250: Performed by: FAMILY MEDICINE

## 2023-10-27 PROCEDURE — 99900035 HC TECH TIME PER 15 MIN (STAT)

## 2023-10-27 PROCEDURE — 27000221 HC OXYGEN, UP TO 24 HOURS

## 2023-10-27 PROCEDURE — 94640 AIRWAY INHALATION TREATMENT: CPT

## 2023-10-27 PROCEDURE — 99233 PR SUBSEQUENT HOSPITAL CARE,LEVL III: ICD-10-PCS | Mod: ,,, | Performed by: INTERNAL MEDICINE

## 2023-10-27 PROCEDURE — 94761 N-INVAS EAR/PLS OXIMETRY MLT: CPT

## 2023-10-27 PROCEDURE — 25000242 PHARM REV CODE 250 ALT 637 W/ HCPCS: Performed by: FAMILY MEDICINE

## 2023-10-27 PROCEDURE — 99233 SBSQ HOSP IP/OBS HIGH 50: CPT | Mod: ,,, | Performed by: INTERNAL MEDICINE

## 2023-10-27 PROCEDURE — P9016 RBC LEUKOCYTES REDUCED: HCPCS | Performed by: FAMILY MEDICINE

## 2023-10-27 PROCEDURE — 36410 VNPNXR 3YR/> PHY/QHP DX/THER: CPT

## 2023-10-27 PROCEDURE — 85018 HEMOGLOBIN: CPT | Performed by: FAMILY MEDICINE

## 2023-10-27 PROCEDURE — 20000000 HC ICU ROOM

## 2023-10-27 PROCEDURE — 83605 ASSAY OF LACTIC ACID: CPT | Performed by: INTERNAL MEDICINE

## 2023-10-27 PROCEDURE — 85014 HEMATOCRIT: CPT | Mod: 91 | Performed by: INTERNAL MEDICINE

## 2023-10-27 RX ORDER — HYDROCODONE BITARTRATE AND ACETAMINOPHEN 500; 5 MG/1; MG/1
TABLET ORAL
Status: DISCONTINUED | OUTPATIENT
Start: 2023-10-27 | End: 2023-10-29 | Stop reason: HOSPADM

## 2023-10-27 RX ORDER — SUCRALFATE 1 G/10ML
1 SUSPENSION ORAL EVERY 6 HOURS
Status: DISCONTINUED | OUTPATIENT
Start: 2023-10-27 | End: 2023-10-29

## 2023-10-27 RX ADMIN — PANTOPRAZOLE SODIUM 40 MG: 40 INJECTION, POWDER, FOR SOLUTION INTRAVENOUS at 09:10

## 2023-10-27 RX ADMIN — SUCRALFATE ORAL 1 G: 1 SUSPENSION ORAL at 05:10

## 2023-10-27 RX ADMIN — ACETAMINOPHEN 650 MG: 325 TABLET ORAL at 12:10

## 2023-10-27 RX ADMIN — MELATONIN TAB 3 MG 6 MG: 3 TAB at 10:10

## 2023-10-27 RX ADMIN — MONTELUKAST 10 MG: 10 TABLET, FILM COATED ORAL at 09:10

## 2023-10-27 RX ADMIN — CEFTRIAXONE 2 G: 2 INJECTION, POWDER, FOR SOLUTION INTRAMUSCULAR; INTRAVENOUS at 09:10

## 2023-10-27 RX ADMIN — SODIUM CHLORIDE: 9 INJECTION, SOLUTION INTRAVENOUS at 04:10

## 2023-10-27 RX ADMIN — METHYLPREDNISOLONE SODIUM SUCCINATE 40 MG: 40 INJECTION, POWDER, FOR SOLUTION INTRAMUSCULAR; INTRAVENOUS at 09:10

## 2023-10-27 RX ADMIN — HYDROCODONE BITARTRATE AND ACETAMINOPHEN 1 TABLET: 5; 325 TABLET ORAL at 09:10

## 2023-10-27 RX ADMIN — HYDROXYCHLOROQUINE SULFATE 200 MG: 200 TABLET, FILM COATED ORAL at 09:10

## 2023-10-27 RX ADMIN — IPRATROPIUM BROMIDE AND ALBUTEROL SULFATE 3 ML: 2.5; .5 SOLUTION RESPIRATORY (INHALATION) at 07:10

## 2023-10-27 RX ADMIN — ATORVASTATIN CALCIUM 40 MG: 40 TABLET, FILM COATED ORAL at 09:10

## 2023-10-27 RX ADMIN — MEGESTROL ACETATE 400 MG: 40 SUSPENSION ORAL at 09:10

## 2023-10-27 RX ADMIN — IOHEXOL 100 ML: 350 INJECTION, SOLUTION INTRAVENOUS at 04:10

## 2023-10-27 RX ADMIN — METRONIDAZOLE 500 MG: 5 INJECTION, SOLUTION INTRAVENOUS at 11:10

## 2023-10-27 RX ADMIN — METRONIDAZOLE 500 MG: 5 INJECTION, SOLUTION INTRAVENOUS at 12:10

## 2023-10-27 RX ADMIN — PHYTONADIONE 10 MG: 10 INJECTION, EMULSION INTRAMUSCULAR; INTRAVENOUS; SUBCUTANEOUS at 06:10

## 2023-10-27 RX ADMIN — IPRATROPIUM BROMIDE AND ALBUTEROL SULFATE 3 ML: 2.5; .5 SOLUTION RESPIRATORY (INHALATION) at 01:10

## 2023-10-27 RX ADMIN — METRONIDAZOLE 500 MG: 5 INJECTION, SOLUTION INTRAVENOUS at 04:10

## 2023-10-27 RX ADMIN — IPRATROPIUM BROMIDE AND ALBUTEROL SULFATE 3 ML: 2.5; .5 SOLUTION RESPIRATORY (INHALATION) at 08:10

## 2023-10-27 RX ADMIN — TAMSULOSIN HYDROCHLORIDE 0.4 MG: 0.4 CAPSULE ORAL at 09:10

## 2023-10-27 RX ADMIN — HYDROCODONE BITARTRATE AND ACETAMINOPHEN 1 TABLET: 5; 325 TABLET ORAL at 04:10

## 2023-10-27 NOTE — ASSESSMENT & PLAN NOTE
- source unclear  - continue Abx for now  - cultures pending  - lactic improved this afternoon despite his bleeding

## 2023-10-27 NOTE — SUBJECTIVE & OBJECTIVE
Subjective:     Interval History: seen this afternoon. Daughter, sister and brother in law at bedside.     Melena in adult diaper. Denies abdominal pain or chest pain. Remains on 2L of supplemental oxygen NC. He has a known left lower lobe necrotic irregular mass and spiculated nodules in the RLL.     Review of Systems  Objective:     Vital Signs (Most Recent):  Temp: 98.5 °F (36.9 °C) (10/27/23 1509)  Pulse: 109 (10/27/23 1509)  Resp: 20 (10/27/23 1617)  BP: (!) 95/52 (10/27/23 1509)  SpO2: 98 % (10/27/23 1509) Vital Signs (24h Range):  Temp:  [97.8 °F (36.6 °C)-99.7 °F (37.6 °C)] 98.5 °F (36.9 °C)  Pulse:  [] 109  Resp:  [18-28] 20  SpO2:  [96 %-100 %] 98 %  BP: ()/(50-66) 95/52     Weight: 63.1 kg (139 lb 1.8 oz) (10/26/23 0015)  Body mass index is 19.4 kg/m².      Intake/Output Summary (Last 24 hours) at 10/27/2023 1658  Last data filed at 10/27/2023 1646  Gross per 24 hour   Intake 2262.43 ml   Output --   Net 2262.43 ml       Lines/Drains/Airways       Peripheral Intravenous Line  Duration                  Peripheral IV - Single Lumen 10/25/23 1659 20 G Left Antecubital 1 day         Midline Catheter Insertion/Assessment  - Single Lumen 10/27/23 1650 Left basilic vein (medial side of arm) <1 day                     Physical Exam  Constitutional:       Appearance: He is ill-appearing.   Cardiovascular:      Rate and Rhythm: Normal rate and regular rhythm.      Heart sounds: Murmur heard.      Systolic murmur is present with a grade of 4/6.   Pulmonary:      Breath sounds: Examination of the right-lower field reveals decreased breath sounds and rales. Examination of the left-lower field reveals decreased breath sounds. Decreased breath sounds and rales present.   Neurological:      General: No focal deficit present.      Mental Status: He is alert and oriented to person, place, and time. Mental status is at baseline.   Psychiatric:         Attention and Perception: Attention normal.         Mood  and Affect: Mood normal.         Behavior: Behavior normal.         Thought Content: Thought content normal.         Cognition and Memory: Cognition normal.         Judgment: Judgment normal.          Significant Labs:  CBC:   Recent Labs   Lab 10/26/23  0537 10/26/23  2027 10/27/23  0852   WBC 15.82*  --   --    HGB 5.0* 8.3* 6.9*   HCT 16.0* 24.8* 21.3*     --   --      CMP:   Recent Labs   Lab 10/26/23  0642   *   CALCIUM 7.6*   ALBUMIN 2.1*   PROT 4.7*      K 4.3   CO2 18*      BUN 35*   CREATININE 1.1   ALKPHOS 46*   ALT 78*   AST 58*   BILITOT 0.5     Coagulation:   Recent Labs   Lab 10/26/23  0752   INR 1.4*         Significant Imaging:  Imaging results within the past 24 hours have been reviewed.

## 2023-10-27 NOTE — ASSESSMENT & PLAN NOTE
Patient's COPD is controlled currently.  Patient is currently off COPD Pathway. Continue scheduled inhalers duonebs prn, Steroids, Antibiotics and Supplemental oxygen and monitor respiratory status closely.     abg stat  cta negative for pulmonary embolism  Significant emphysema   chronically on 3L  Mildly elevated procalcitonin   Steroid burst

## 2023-10-27 NOTE — ASSESSMENT & PLAN NOTE
Received enema in ED. No BM as of yet.  Plan:  -monitor for BM  -stool softeners    Large bowel movement with bright red blood  Multiple bowel movements after enema  Hb/hct pending  Received blood transfusion   GI bleed consult for possible flex sig

## 2023-10-27 NOTE — ASSESSMENT & PLAN NOTE
Currently normotensive.BP usually well controlled per patient with home medications.  Plan:  -Optimize pain control   -Continue home medications (lasix, losartan, norvasc), titrate as needed   -Monitor BP  -Low salt/cardiac diet when not NPO  -IV hydralazine prn for SBP>160 or DBP>90       Hypotension improving  Steroid burst  Continuous fluids

## 2023-10-27 NOTE — PROGRESS NOTES
[facility] Emergency Room Medicine  Progress Note    Patient Name: Navdeep Sesay Jr.  MRN: [id number]  Patient Class: IP- Inpatient   Admission Date: 10/25/2023  Length of Stay: 2 days  Attending Physician: Michele Vang MD  Primary Care Provider: [facility]        Subjective:     Principal Problem:Sepsis        HPI:  Navdeep Sesay Jr. is a 78 y.o. male with a PMH  has a past medical history of Aortic stenosis, Chronic anemia, COPD (chronic obstructive pulmonary disease), Hypertension, and Rheumatoid arthritis.  Presented to the ER for evaluation of constipation.  Patient reports he has been having difficulty having a bowel movement over the last 3 days.  No relief with MiraLax.  Patient reports removing stool from his rectum digitally due to severe constipation.  Patient also reports some shortness of breath on exertion.  Patient chronically wears home O2 at 3 liters/minute via nasal cannula.  Patient reports he had a blood transfusion 2 weeks ago secondary to his symptomatic anemia.  Denies any chest pain or any other symptoms at this time.    ER workup revealed leukocytosis of 15.94, H/H of 8.6/27.3, , , troponin 0.080, lactic acid 2.5 CTA of the chest negative for PE, flat and erect abdomen x-ray revealed large stool burden and possible fecal impaction, chest x-ray revealed no acute findings, and EKG revealing sinus tachycardia with PACs with a ventricular rate of 115 beats per minute, RBBB, QT/QTC of 358/495.  Patient received Mag citrate/glycerin enema, 1 L of normal saline and 4.5 g of Zosyn in ED. hospital Medicine consulted.  Patient in agreement with treatment plan.  Patient will be admitted under inpatient status.    PCP: No, Primary Doctor          Overview/Hospital Course:  10/26 admitted for constipation. Large bowel movement with bright red blood. Hb/hct downtrending. Lactic acidosis. Complains of dyspnea, on 3L chronically. Ill appearing. Low threshold for  transferring to icu.   10/27 more alert. Multiple bowel movements. Continues to complain of weakness and dyspnea. Gastroenterology following. Repeat hb/hct pending.      Interval History: See hospital course for today      Review of Systems   Constitutional:  Positive for activity change, appetite change and fatigue. Negative for fever.   Respiratory:  Positive for shortness of breath.    Gastrointestinal:  Negative for abdominal pain, constipation, nausea and vomiting.   Neurological:  Positive for weakness.   Psychiatric/Behavioral:  Positive for confusion and dysphoric mood. Negative for agitation, behavioral problems and decreased concentration.      Objective:     Vital Signs (Most Recent):  Temp: 99.7 °F (37.6 °C) (10/27/23 0732)  Pulse: 105 (10/27/23 0757)  Resp: 20 (10/27/23 0757)  BP: (!) 107/59 (10/27/23 0732)  SpO2: 99 % (10/27/23 0757) Vital Signs (24h Range):  Temp:  [97.8 °F (36.6 °C)-99.7 °F (37.6 °C)] 99.7 °F (37.6 °C)  Pulse:  [] 105  Resp:  [18-22] 20  SpO2:  [96 %-100 %] 99 %  BP: ()/(50-66) 107/59     Weight: 63.1 kg (139 lb 1.8 oz)  Body mass index is 19.4 kg/m².    Intake/Output Summary (Last 24 hours) at 10/27/2023 0834  Last data filed at 10/26/2023 2038  Gross per 24 hour   Intake 1016.67 ml   Output --   Net 1016.67 ml         Physical Exam  Vitals and nursing note reviewed. Exam conducted with a chaperone present (nursing).   Constitutional:       General: He is awake. He is not in acute distress.     Appearance: He is underweight. He is ill-appearing. He is not toxic-appearing.      Interventions: Nasal cannula in place.   HENT:      Head: Normocephalic and atraumatic.   Cardiovascular:      Rate and Rhythm: Normal rate.   Pulmonary:      Effort: Pulmonary effort is normal. Tachypnea present. No respiratory distress.      Breath sounds: Decreased air movement present.   Abdominal:      General: There is distension.      Palpations: Abdomen is soft.      Tenderness: There is no  abdominal tenderness.   Musculoskeletal:      Right lower leg: No edema.      Left lower leg: No edema.   Skin:     General: Skin is warm.      Coloration: Skin is pale.   Neurological:      Mental Status: Mental status is at baseline. He is lethargic.      Motor: Weakness present.   Psychiatric:         Mood and Affect: Mood is anxious and depressed.         Behavior: Behavior is cooperative.         Cognition and Memory: He exhibits impaired recent memory.             Significant Labs: All pertinent labs within the past 24 hours have been reviewed.  CBC:   Recent Labs   Lab 10/25/23  1509 10/26/23  0537 10/26/23  2027   WBC 15.94* 15.82*  --    HGB 8.6* 5.0* 8.3*   HCT 27.3* 16.0* 24.8*    283  --      CMP:   Recent Labs   Lab 10/25/23  1509 10/26/23  0642    141   K 3.8 4.3    107   CO2 23 18*   * 132*   BUN 33* 35*   CREATININE 1.4 1.1   CALCIUM 9.2 7.6*   PROT 6.6 4.7*   ALBUMIN 2.8* 2.1*   BILITOT 0.6 0.5   ALKPHOS 58 46*   AST 38 58*   ALT 73* 78*   ANIONGAP 16 16       Significant Imaging: I have reviewed all pertinent imaging results/findings within the past 24 hours.      Assessment/Plan:      * Sepsis  This patient does have evidence of infective focus  My overall impression is sepsis.  Source: Unknown  Antibiotics given-   Antibiotics (72h ago, onward)    Start     Stop Route Frequency Ordered    10/26/23 0845  cefTRIAXone (ROCEPHIN) 2 g in dextrose 5 % in water (D5W) 100 mL IVPB (MB+)         -- IV Every 24 hours (non-standard times) 10/26/23 0742    10/26/23 0845  metronidazole IVPB 500 mg         -- IV Every 8 hours (non-standard times) 10/26/23 0742        Latest lactate reviewed-  Recent Labs   Lab 10/25/23  2030 10/25/23  2144 10/26/23  0642   LACTATE 7.7* 6.6* 5.9*       Fluid challenge Other- Patient to receive 1 liter volume other than 30cc/kg due to Congestive Heart Failure     Post- resuscitation assessment No - Post resuscitation assessment not needed       Will Not  start Pressors- Levophed for MAP of 65  Source control achieved by: Abx, ivf,     Abdominal source vs pneumonia  Lactate remains elevated  Add antibiotic(s) to cover pneumonia and abdominal source  Received one time dose zosyn in emergency department   Continue fluids    Hematochezia  Gastroenterology consulted       Gastroesophageal reflux disease  Chronic. Stable. Currently asymptomatic. Home medications include PPI/Antacids as needed.  Plan:  -Continue PPI/Antacids as needed     Intravenous proton pump inhibitor     Chronic anemia  Unknown baseline without evidence of active bleeding noted.   Recent Labs   Lab 10/25/23  1509 10/26/23  0537 10/26/23  2027   HGB 8.6* 5.0* 8.3*   HCT 27.3* 16.0* 24.8*   MCV 93 95  --    MCHC 31.5* 31.3*  --    RDW 17.1* 17.4*  --     283  --      Plan:  -Monitor H/H and plts  -Type and screen, transfuse as needed   -Continue home medications   -Hold antiplatelets/anticoagulants in setting of active bleeding/pending surgical intervention       Blood transfusion completed  Has been receiving blood transfusions on the regular outpatient   Nuclear medicine bleed scan negative for GI bleed   Gastroenterology consult      Constipation  Received enema in ED. No BM as of yet.  Plan:  -monitor for BM  -stool softeners    Large bowel movement with bright red blood  Multiple bowel movements after enema  Hb/hct pending  Received blood transfusion   GI bleed consult for possible flex sig    Chronic heart failure with preserved ejection fraction  Patient is identified as having unspecified heart failure that is Chronic. CHF is currently controlled. Latest ECHO performed and demonstrates- Results for orders placed during the hospital encounter of 08/10/23    Echo    Interpretation Summary    Left Ventricle: There is low normal systolic function with a visually estimated ejection fraction of 50 - 55%. Grade I diastolic dysfunction.    Right Ventricle: Normal right ventricular cavity size.  "Wall thickness is normal. Right ventricle wall motion  is normal. Systolic function is normal.    Aortic Valve: The aortic valve is a trileaflet valve. There is moderate aortic valve sclerosis. There is moderate to severe stenosis. Aortic valve area by VTI is 0.83 cm². Aortic valve peak velocity is 3.62 m/s. Mean gradient is 36 mmHg. The dimensionless index is 0.27. There is mild aortic regurgitation with a centrally directed jet.    Mitral Valve: There is mild bileaflet sclerosis. There is moderate to severe stenosis. The mean pressure gradient across the mitral valve is 10 mmHg at a heart rate of  bpm. There is mild regurgitation with a centrally directed jet.    Tricuspid Valve: There is normal leaflet mobility. There is mild regurgitation with a centrally directed jet.  . Continue ACE/ARB Furosemide and monitor clinical status closely. Monitor on telemetry. Patient is off CHF pathway.  Monitor strict Is&Os and daily weights.  Place on fluid restriction of 2 L. Continue to stress to patient importance of self efficacy and  on diet for CHF. Last BNP reviewed- and noted below   Recent Labs   Lab 10/25/23  1509   *   .  Gentle intravenous fluids  Bolus 500cc      COPD (chronic obstructive pulmonary disease)  Patient's COPD is controlled currently.  Patient is currently off COPD Pathway. Continue scheduled inhalers duonebs prn, Steroids, Antibiotics and Supplemental oxygen and monitor respiratory status closely.     abg stat  cta negative for pulmonary embolism  Significant emphysema   chronically on 3L  Mildly elevated procalcitonin   Steroid burst      Type 2 diabetes mellitus  Patient's FSGs are uncontrolled due to hyperglycemia on current medication regimen.  Last A1c reviewed-   Lab Results   Component Value Date    HGBA1C 7.5 (H) 09/02/2021     Most recent fingerstick glucose reviewed- No results for input(s): "POCTGLUCOSE" in the last 24 hours.  Current correctional scale  Low  Maintain " "anti-hyperglycemic dose as follows-   Antihyperglycemics (From admission, onward)    None        Most recent A1c measuring   Hemoglobin A1C   Date Value Ref Range Status   09/02/2021 7.5 (H) <=6.5 % Final      Plan:  -SSI  -Accu-checks   -Hypoglycemic protocol   -Hold any oral antihyperglycemics while inpatient     Controlled  Will relax normotensive/normoglycemic range in this geriatric population          Rheumatoid arthritis  Voices no complaints. Compliant with home meds.  Plan:  -continue home meds  On steroids chronically  Steroid burst    Other hyperlipidemia  Patient is chronically on statin.will continue for now. Last Lipid Panel: No results found for: "CHOL", "HDL", "LDLCALC", "TRIG", "CHOLHDL"  Plan:  -Continue home medication  -low fat/low calorie diet    Npo pending gastroenterology evaluation     Essential hypertension  Currently normotensive.BP usually well controlled per patient with home medications.  Plan:  -Optimize pain control   -Continue home medications (lasix, losartan, norvasc), titrate as needed   -Monitor BP  -Low salt/cardiac diet when not NPO  -IV hydralazine prn for SBP>160 or DBP>90       Hypotension improving  Steroid burst  Continuous fluids          VTE Risk Mitigation (From admission, onward)         Ordered     Reason for No Pharmacological VTE Prophylaxis  Once        Question:  Reasons:  Answer:  Physician Provided (leave comment)    10/25/23 1936     IP VTE HIGH RISK PATIENT  Once         10/25/23 1936     Place sequential compression device  Until discontinued         10/25/23 1936                Discharge Planning   EDSON:      Code Status: DNR   Is the patient medically ready for discharge?:     Reason for patient still in hospital (select all that apply): Patient trending condition, Laboratory test, Treatment, Imaging, Consult recommendations and Pending disposition  Discharge Plan A: Other (TBD)                  Michele Vang MD  Department of Hospital Medicine   [facility] - " Telemetry ([facility])

## 2023-10-27 NOTE — ASSESSMENT & PLAN NOTE
This patient does have evidence of infective focus  My overall impression is sepsis.  Source: Unknown  Antibiotics given-   Antibiotics (72h ago, onward)    Start     Stop Route Frequency Ordered    10/26/23 0845  cefTRIAXone (ROCEPHIN) 2 g in dextrose 5 % in water (D5W) 100 mL IVPB (MB+)         -- IV Every 24 hours (non-standard times) 10/26/23 0742    10/26/23 0845  metronidazole IVPB 500 mg         -- IV Every 8 hours (non-standard times) 10/26/23 0742        Latest lactate reviewed-  Recent Labs   Lab 10/25/23  2030 10/25/23  2144 10/26/23  0642   LACTATE 7.7* 6.6* 5.9*       Fluid challenge Other- Patient to receive 1 liter volume other than 30cc/kg due to Congestive Heart Failure     Post- resuscitation assessment No - Post resuscitation assessment not needed       Will Not start Pressors- Levophed for MAP of 65  Source control achieved by: Abx, ivf,     Abdominal source vs pneumonia  Lactate remains elevated  Add antibiotic(s) to cover pneumonia and abdominal source  Received one time dose zosyn in emergency department   Continue fluids

## 2023-10-27 NOTE — ASSESSMENT & PLAN NOTE
- complete 2 units now and repeat H/H  --- goal > 8 per GI for procedure  - GI planning endoscopy for the AM  - closely monitor pace and character of stools  - getting IV Vit K x 1 for INR 1.4  - continue IV PPI BID

## 2023-10-27 NOTE — ASSESSMENT & PLAN NOTE
Was constipated prior to admission  Not responsive to Miralax  Self digital disimpaction 10/25/23  Currently having BMs.

## 2023-10-27 NOTE — ASSESSMENT & PLAN NOTE
Patient is identified as having unspecified heart failure that is Chronic. CHF is currently controlled. Latest ECHO performed and demonstrates- Results for orders placed during the hospital encounter of 08/10/23    Echo    Interpretation Summary    Left Ventricle: There is low normal systolic function with a visually estimated ejection fraction of 50 - 55%. Grade I diastolic dysfunction.    Right Ventricle: Normal right ventricular cavity size. Wall thickness is normal. Right ventricle wall motion  is normal. Systolic function is normal.    Aortic Valve: The aortic valve is a trileaflet valve. There is moderate aortic valve sclerosis. There is moderate to severe stenosis. Aortic valve area by VTI is 0.83 cm². Aortic valve peak velocity is 3.62 m/s. Mean gradient is 36 mmHg. The dimensionless index is 0.27. There is mild aortic regurgitation with a centrally directed jet.    Mitral Valve: There is mild bileaflet sclerosis. There is moderate to severe stenosis. The mean pressure gradient across the mitral valve is 10 mmHg at a heart rate of  bpm. There is mild regurgitation with a centrally directed jet.    Tricuspid Valve: There is normal leaflet mobility. There is mild regurgitation with a centrally directed jet.  . Continue ACE/ARB Furosemide and monitor clinical status closely. Monitor on telemetry. Patient is off CHF pathway.  Monitor strict Is&Os and daily weights.  Place on fluid restriction of 2 L. Continue to stress to patient importance of self efficacy and  on diet for CHF. Last BNP reviewed- and noted below   Recent Labs   Lab 10/25/23  1509   *   .  Gentle intravenous fluids  Bolus 500cc

## 2023-10-27 NOTE — SUBJECTIVE & OBJECTIVE
Interval History: See hospital course for today      Review of Systems   Constitutional:  Positive for activity change, appetite change and fatigue. Negative for fever.   Respiratory:  Positive for shortness of breath.    Gastrointestinal:  Negative for abdominal pain, constipation, nausea and vomiting.   Neurological:  Positive for weakness.   Psychiatric/Behavioral:  Positive for confusion and dysphoric mood. Negative for agitation, behavioral problems and decreased concentration.      Objective:     Vital Signs (Most Recent):  Temp: 99.7 °F (37.6 °C) (10/27/23 0732)  Pulse: 105 (10/27/23 0757)  Resp: 20 (10/27/23 0757)  BP: (!) 107/59 (10/27/23 0732)  SpO2: 99 % (10/27/23 0757) Vital Signs (24h Range):  Temp:  [97.8 °F (36.6 °C)-99.7 °F (37.6 °C)] 99.7 °F (37.6 °C)  Pulse:  [] 105  Resp:  [18-22] 20  SpO2:  [96 %-100 %] 99 %  BP: ()/(50-66) 107/59     Weight: 63.1 kg (139 lb 1.8 oz)  Body mass index is 19.4 kg/m².    Intake/Output Summary (Last 24 hours) at 10/27/2023 0834  Last data filed at 10/26/2023 2038  Gross per 24 hour   Intake 1016.67 ml   Output --   Net 1016.67 ml         Physical Exam  Vitals and nursing note reviewed. Exam conducted with a chaperone present (nursing).   Constitutional:       General: He is awake. He is not in acute distress.     Appearance: He is underweight. He is ill-appearing. He is not toxic-appearing.      Interventions: Nasal cannula in place.   HENT:      Head: Normocephalic and atraumatic.   Cardiovascular:      Rate and Rhythm: Normal rate.   Pulmonary:      Effort: Pulmonary effort is normal. Tachypnea present. No respiratory distress.      Breath sounds: Decreased air movement present.   Abdominal:      General: There is distension.      Palpations: Abdomen is soft.      Tenderness: There is no abdominal tenderness.   Musculoskeletal:      Right lower leg: No edema.      Left lower leg: No edema.   Skin:     General: Skin is warm.      Coloration: Skin is pale.    Neurological:      Mental Status: Mental status is at baseline. He is lethargic.      Motor: Weakness present.   Psychiatric:         Mood and Affect: Mood is anxious and depressed.         Behavior: Behavior is cooperative.         Cognition and Memory: He exhibits impaired recent memory.             Significant Labs: All pertinent labs within the past 24 hours have been reviewed.  CBC:   Recent Labs   Lab 10/25/23  1509 10/26/23  0537 10/26/23  2027   WBC 15.94* 15.82*  --    HGB 8.6* 5.0* 8.3*   HCT 27.3* 16.0* 24.8*    283  --      CMP:   Recent Labs   Lab 10/25/23  1509 10/26/23  0642    141   K 3.8 4.3    107   CO2 23 18*   * 132*   BUN 33* 35*   CREATININE 1.4 1.1   CALCIUM 9.2 7.6*   PROT 6.6 4.7*   ALBUMIN 2.8* 2.1*   BILITOT 0.6 0.5   ALKPHOS 58 46*   AST 38 58*   ALT 73* 78*   ANIONGAP 16 16       Significant Imaging: I have reviewed all pertinent imaging results/findings within the past 24 hours.

## 2023-10-27 NOTE — CARE UPDATE
Evaluated patient due to concerns of black stool    Actively passing melena  Gastroenterology recommending resuscitation, scope procedure, and transfer to icu    CTA GI bleed ordered stat  Blood transfusion ordered stat  Hb/hct continues to downward trend despite 2 unit prbcs transfusion

## 2023-10-27 NOTE — PLAN OF CARE
Problem: Diabetes Comorbidity  Goal: Blood Glucose Level Within Targeted Range  Outcome: Ongoing, Progressing     Problem: Skin Injury Risk Increased  Goal: Skin Health and Integrity  Outcome: Ongoing, Progressing  Intervention: Optimize Skin Protection  Flowsheets (Taken 10/27/2023 1508)  Pressure Reduction Techniques: weight shift assistance provided  Pressure Reduction Devices: heel offloading device utilized  Skin Protection:   incontinence pads utilized   skin sealant/moisture barrier applied   adhesive use limited  Head of Bed (HOB) Positioning: HOB at 20-30 degrees     Problem: Bleeding (Sepsis/Septic Shock)  Goal: Absence of Bleeding  Outcome: Ongoing, Progressing  Intervention: Monitor and Manage Bleeding  Flowsheets (Taken 10/27/2023 1508)  Bleeding Precautions:   blood pressure closely monitored   foot protection facilitated   monitored for signs of bleeding  Bleeding Management: blood products administered

## 2023-10-27 NOTE — PROCEDURES
"Navdeep Sesay Jr. is a 78 y.o. male patient.    Temp: 98.5 °F (36.9 °C) (10/27/23 1509)  Pulse: 109 (10/27/23 1509)  Resp: 20 (10/27/23 1617)  BP: (!) 95/52 (10/27/23 1509)  SpO2: 98 % (10/27/23 1509)  Weight: 63.1 kg (139 lb 1.8 oz) (10/26/23 0015)  Height: 5' 11" (180.3 cm) (10/25/23 2049)       Procedures    10/27/2023    Midline placed under ultrasound guidance via seldinger technique. Patient prep with chloraprep. < 5 cc blood loss. 5french midline placed in left brachial vein.   "

## 2023-10-27 NOTE — PROGRESS NOTES
[facility] - Intensive Care ([facility])  Gastroenterology  Progress Note    Patient Name: Navdeep Sesay Jr.  MRN: [id number]  Admission Date: 10/25/2023  Hospital Length of Stay: 2 days  Code Status: DNR   Attending Provider: Mt Davis MD  Consulting Provider: Angela Crockett MD  Primary Care Physician: Inessa Guzman  Principal Problem: Sepsis        Subjective:     Interval History: seen this afternoon. Daughter, sister and brother in law at bedside.     Melena in adult diaper. Denies abdominal pain or chest pain. Remains on 2L of supplemental oxygen NC. He has a known left lower lobe necrotic irregular mass and spiculated nodules in the RLL.     Review of Systems  Objective:     Vital Signs (Most Recent):  Temp: 98.5 °F (36.9 °C) (10/27/23 1509)  Pulse: 109 (10/27/23 1509)  Resp: 20 (10/27/23 1617)  BP: (!) 95/52 (10/27/23 1509)  SpO2: 98 % (10/27/23 1509) Vital Signs (24h Range):  Temp:  [97.8 °F (36.6 °C)-99.7 °F (37.6 °C)] 98.5 °F (36.9 °C)  Pulse:  [] 109  Resp:  [18-28] 20  SpO2:  [96 %-100 %] 98 %  BP: ()/(50-66) 95/52     Weight: 63.1 kg (139 lb 1.8 oz) (10/26/23 0015)  Body mass index is 19.4 kg/m².      Intake/Output Summary (Last 24 hours) at 10/27/2023 1658  Last data filed at 10/27/2023 1646  Gross per 24 hour   Intake 2262.43 ml   Output --   Net 2262.43 ml       Lines/Drains/Airways       Peripheral Intravenous Line  Duration                  Peripheral IV - Single Lumen 10/25/23 1659 20 G Left Antecubital 1 day         Midline Catheter Insertion/Assessment  - Single Lumen 10/27/23 1650 Left basilic vein (medial side of arm) <1 day                     Physical Exam  Constitutional:       Appearance: He is ill-appearing.   Cardiovascular:      Rate and Rhythm: Normal rate and regular rhythm.      Heart sounds: Murmur heard.      Systolic murmur is present with a grade of 4/6.   Pulmonary:      Breath sounds: Examination of the right-lower field reveals decreased breath  sounds and rales. Examination of the left-lower field reveals decreased breath sounds. Decreased breath sounds and rales present.   Neurological:      General: No focal deficit present.      Mental Status: He is alert and oriented to person, place, and time. Mental status is at baseline.   Psychiatric:         Attention and Perception: Attention normal.         Mood and Affect: Mood normal.         Behavior: Behavior normal.         Thought Content: Thought content normal.         Cognition and Memory: Cognition normal.         Judgment: Judgment normal.          Significant Labs:  CBC:   Recent Labs   Lab 10/26/23  0537 10/26/23  2027 10/27/23  0852   WBC 15.82*  --   --    HGB 5.0* 8.3* 6.9*   HCT 16.0* 24.8* 21.3*     --   --      CMP:   Recent Labs   Lab 10/26/23  0642   *   CALCIUM 7.6*   ALBUMIN 2.1*   PROT 4.7*      K 4.3   CO2 18*      BUN 35*   CREATININE 1.1   ALKPHOS 46*   ALT 78*   AST 58*   BILITOT 0.5     Coagulation:   Recent Labs   Lab 10/26/23  0752   INR 1.4*         Significant Imaging:  Imaging results within the past 24 hours have been reviewed.    Assessment/Plan:     Oncology  Chronic anemia  Extensive GI work up in the past  Requires transfusions every 2 months  Hospitalized for acute on chronic anemia in the setting of overt bleeding.   Given the recent constipation, rectal ulcer would be high on the differential.  CTA 10/27/23 negative.   Agree with transfusion and IV Protonix.   Continue to monitor H/H.   Keep NPO.     GI  Constipation  Was constipated prior to admission  Not responsive to Miralax  Self digital disimpaction 10/25/23  Currently having BMs.       Melena  Painless  Neg tagged RBC scan 10/26/23       Recommendations:  1. Agree with transfusion and resuscitation efforts  2. Unsedated flex sig in AM +/- EGD  3. Vitamin K 10mg IV x 1       Discussed at length with daughter Analilia Horne, sister and brother in law. He is DNR but the DNR code status will be  held during endoscopy. He is high risk for endoscopic procedures and at risk for pulmonary complications. Will reassess the patient once he has been resuscitated. Communicated with Dr. Davis, ICU and Dr. Vang of [facility] medicine. I will follow-up with patient. Please contact us if you have any additional questions.       Angela Crockett MD  Gastroenterology  '[place] - Intensive Care ([facility])

## 2023-10-27 NOTE — ASSESSMENT & PLAN NOTE
Unknown baseline without evidence of active bleeding noted.   Recent Labs   Lab 10/25/23  1509 10/26/23  0537 10/26/23  2027   HGB 8.6* 5.0* 8.3*   HCT 27.3* 16.0* 24.8*   MCV 93 95  --    MCHC 31.5* 31.3*  --    RDW 17.1* 17.4*  --     283  --      Plan:  -Monitor H/H and plts  -Type and screen, transfuse as needed   -Continue home medications   -Hold antiplatelets/anticoagulants in setting of active bleeding/pending surgical intervention       Blood transfusion completed  Has been receiving blood transfusions on the regular outpatient   Nuclear medicine bleed scan negative for GI bleed   Gastroenterology consult

## 2023-10-27 NOTE — ASSESSMENT & PLAN NOTE
Extensive GI work up in the past  Requires transfusions every 2 months  Hospitalized for acute on chronic anemia in the setting of overt bleeding.   Given the recent constipation, rectal ulcer would be high on the differential.  CTA 10/27/23 negative.   Agree with transfusion and IV Protonix.   Continue to monitor H/H.   Keep NPO.

## 2023-10-27 NOTE — ASSESSMENT & PLAN NOTE
Voices no complaints. Compliant with home meds.  Plan:  -continue home meds  On steroids chronically  Steroid burst

## 2023-10-27 NOTE — CONSULTS
[facility] - Intensive Care ([facility])  Critical Care Medicine  Consult Note    Patient Name: Navdeep Sesay Jr.  MRN: [id number]  Admission Date: 10/25/2023  Hospital Length of Stay: 2 days  Code Status: DNR  Attending Physician: Mt Davis MD   Primary Care Provider: [facility]   Principal Problem: Sepsis    Subjective:     HPI:  Mr Sesay is an 79 y/o WM with AS, chronic anemia (transfusion dependent), COPD on home O2, HTN, and RA who was initially admitted to the Hospitalist service on 10/25 for constipation.  Once he did have a BM, it was followed by quite a bit of blood.  This afternoon, he started passing more melena and BP started to get soft, and so he was transferred to the ICU.      I examined him shortly after arrival.  He says that he feels a bit dizzy at times and has some mild diffuse abdominal pain.  Deneis SOB above his baseline.  Denies CP, cough/sputum, change in urine, N/V, or any hematemesis.  SBP in the 90's and he is getting 1 of 2 planned units of blood.  CTA this afternoon is negative for active bleeding.      Hospital/ICU Course:  No notes on file    Past Medical History:   Diagnosis Date    Aortic stenosis     Chronic anemia     COPD (chronic obstructive pulmonary disease)     Hypertension     Rheumatoid arthritis        History reviewed. No pertinent surgical history.    Review of patient's allergies indicates:  No Known Allergies    Family History       Problem Relation (Age of Onset)    No Known Problems Mother, Father          Tobacco Use    Smoking status: Never    Smokeless tobacco: Never   Substance and Sexual Activity    Alcohol use: Never    Drug use: Never    Sexual activity: Not Currently         Review of Systems   All other systems reviewed and are negative.    Objective:     Vital Signs (Most Recent):  Temp: 98.5 °F (36.9 °C) (10/27/23 1509)  Pulse: 108 (10/27/23 1701)  Resp: (!) 28 (10/27/23 1701)  BP: (!) 107/57 (10/27/23 1701)  SpO2: 100 %  (10/27/23 1701) Vital Signs (24h Range):  Temp:  [97.8 °F (36.6 °C)-99.7 °F (37.6 °C)] 98.5 °F (36.9 °C)  Pulse:  [] 108  Resp:  [18-28] 28  SpO2:  [96 %-100 %] 100 %  BP: ()/(50-63) 107/57     Weight: 63.1 kg (139 lb 1.8 oz)  Body mass index is 19.4 kg/m².      Intake/Output Summary (Last 24 hours) at 10/27/2023 1804  Last data filed at 10/27/2023 1700  Gross per 24 hour   Intake 1983.71 ml   Output 100 ml   Net 1883.71 ml        Physical Exam  Vitals and nursing note reviewed.   Constitutional:       General: He is not in acute distress.     Appearance: He is ill-appearing (chronically).   HENT:      Head: Normocephalic and atraumatic.   Eyes:      General: No scleral icterus.     Extraocular Movements: Extraocular movements intact.      Conjunctiva/sclera: Conjunctivae normal.      Pupils: Pupils are equal, round, and reactive to light.   Cardiovascular:      Rate and Rhythm: Regular rhythm. Tachycardia present.      Pulses: Normal pulses.      Heart sounds: No murmur heard.  Pulmonary:      Effort: Pulmonary effort is normal. No respiratory distress.      Breath sounds: No wheezing, rhonchi or rales.   Abdominal:      General: Abdomen is flat. There is no distension.      Palpations: Abdomen is soft.      Tenderness: There is abdominal tenderness (mild diffuse). There is no guarding.   Musculoskeletal:      Cervical back: Normal range of motion and neck supple. No rigidity or tenderness.      Right lower leg: No edema.      Left lower leg: No edema.   Skin:     Coloration: Skin is pale.   Neurological:      General: No focal deficit present.      Mental Status: He is alert and oriented to person, place, and time. Mental status is at baseline.          Vents:  Oxygen Concentration (%): 28 (10/26/23 1551)    Lines/Drains/Airways       Peripheral Intravenous Line  Duration                  Peripheral IV - Single Lumen 10/25/23 1659 20 G Left Antecubital 2 days         Midline Catheter  Insertion/Assessment  - Single Lumen 10/27/23 1650 Left basilic vein (medial side of arm) <1 day                    Significant Labs:    CBC/Anemia Profile:  Recent Labs   Lab 10/26/23  0537 10/26/23  2027 10/27/23  0852   WBC 15.82*  --   --    HGB 5.0* 8.3* 6.9*   HCT 16.0* 24.8* 21.3*     --   --    MCV 95  --   --    RDW 17.4*  --   --         Chemistries:  Recent Labs   Lab 10/26/23  0642      K 4.3      CO2 18*   BUN 35*   CREATININE 1.1   CALCIUM 7.6*   ALBUMIN 2.1*   PROT 4.7*   BILITOT 0.5   ALKPHOS 46*   ALT 78*   AST 58*       All pertinent labs within the past 24 hours have been reviewed.    Significant Imaging:   I have reviewed all pertinent imaging results/findings within the past 24 hours.      ABG  Recent Labs   Lab 10/26/23  0802   PH 7.550*   PO2 120*   PCO2 20.4*   HCO3 17.8*   BE -5*     Assessment/Plan:     Pulmonary  COPD (chronic obstructive pulmonary disease)  Not in exacerbation  3L of Home O2 at baseline  Continue bronchodilators  Goal SpO2 > 88%    Cardiac/Vascular  Chronic heart failure with preserved ejection fraction  - closely monitor volume status with resuscitation    Other hyperlipidemia  Statin    Essential hypertension  Holding home meds acutely  Can restart, as necessary    ID  * Sepsis  - source unclear  - continue Abx for now  - cultures pending  - lactic improved this afternoon despite his bleeding    Immunology/Multi System  Rheumatoid arthritis  On chronic prednisone  - continue IV steroids  - continue home Plaquenil    Oncology  Chronic anemia  Gets periodic transfusions as an outpt  Closely monitoring H/H given bleeding    Endocrine  Type 2 diabetes mellitus  SSI/Accuchecks    GI  Melena  - complete 2 units now and repeat H/H  --- goal > 8 per GI for procedure  - GI planning endoscopy for the AM  - closely monitor pace and character of stools  - getting IV Vit K x 1 for INR 1.4  - continue IV PPI BID    Gastroesophageal reflux  disease  PPI    Constipation  Resolved       Critical Care Time: 40 minutes  Critical secondary to high risk monitoring     Critical care was time spent personally by me on the following activities: development of treatment plan with patient or surrogate and bedside caregivers, discussions with consultants, evaluation of patient's response to treatment, examination of patient, ordering and performing treatments and interventions, ordering and review of laboratory studies, ordering and review of radiographic studies, pulse oximetry, re-evaluation of patient's condition. This critical care time did not overlap with that of any other provider or involve time for any procedures.       Mt Davis MD  Critical Care Medicine  [facility] - Intensive Care [facility])

## 2023-10-27 NOTE — HPI
Mr Sesay is an 79 y/o WM with AS, chronic anemia (transfusion dependent), COPD on home O2, HTN, and RA who was initially admitted to the Hospitalist service on 10/25 for constipation.  Once he did have a BM, it was followed by quite a bit of blood.  This afternoon, he started passing more melena and BP started to get soft, and so he was transferred to the ICU.      I examined him shortly after arrival.  He says that he feels a bit dizzy at times and has some mild diffuse abdominal pain.  Deneis SOB above his baseline.  Denies CP, cough/sputum, change in urine, N/V, or any hematemesis.  SBP in the 90's and he is getting 1 of 2 planned units of blood.  CTA this afternoon is negative for active bleeding.

## 2023-10-28 ENCOUNTER — ANESTHESIA EVENT (OUTPATIENT)
Dept: ENDOSCOPY | Facility: HOSPITAL | Age: 78
DRG: 871 | End: 2023-10-28
Payer: MEDICARE

## 2023-10-28 ENCOUNTER — ANESTHESIA (OUTPATIENT)
Dept: ENDOSCOPY | Facility: HOSPITAL | Age: 78
DRG: 871 | End: 2023-10-28
Payer: MEDICARE

## 2023-10-28 LAB
ALBUMIN SERPL BCP-MCNC: 2.2 G/DL (ref 3.5–5.2)
ALP SERPL-CCNC: 43 U/L (ref 55–135)
ALT SERPL W/O P-5'-P-CCNC: 232 U/L (ref 10–44)
ANION GAP SERPL CALC-SCNC: 11 MMOL/L (ref 8–16)
AST SERPL-CCNC: 99 U/L (ref 10–40)
BASOPHILS NFR BLD: 0 % (ref 0–1.9)
BILIRUB SERPL-MCNC: 0.4 MG/DL (ref 0.1–1)
BUN SERPL-MCNC: 37 MG/DL (ref 8–23)
CALCIUM SERPL-MCNC: 7.4 MG/DL (ref 8.7–10.5)
CHLORIDE SERPL-SCNC: 111 MMOL/L (ref 95–110)
CO2 SERPL-SCNC: 18 MMOL/L (ref 23–29)
CREAT SERPL-MCNC: 1.2 MG/DL (ref 0.5–1.4)
DIFFERENTIAL METHOD: ABNORMAL
EOSINOPHIL NFR BLD: 0 % (ref 0–8)
ERYTHROCYTE [DISTWIDTH] IN BLOOD BY AUTOMATED COUNT: 15.9 % (ref 11.5–14.5)
EST. GFR  (NO RACE VARIABLE): >60 ML/MIN/1.73 M^2
GLUCOSE SERPL-MCNC: 151 MG/DL (ref 70–110)
HCT VFR BLD AUTO: 24.8 % (ref 40–54)
HCT VFR BLD AUTO: 25.5 % (ref 40–54)
HGB BLD-MCNC: 8.3 G/DL (ref 14–18)
HGB BLD-MCNC: 8.6 G/DL (ref 14–18)
IMM GRANULOCYTES # BLD AUTO: ABNORMAL K/UL (ref 0–0.04)
IMM GRANULOCYTES NFR BLD AUTO: ABNORMAL % (ref 0–0.5)
LYMPHOCYTES NFR BLD: 7 % (ref 18–48)
MCH RBC QN AUTO: 29.3 PG (ref 27–31)
MCHC RBC AUTO-ENTMCNC: 33.7 G/DL (ref 32–36)
MCV RBC AUTO: 87 FL (ref 82–98)
METAMYELOCYTES NFR BLD MANUAL: 1 %
MONOCYTES NFR BLD: 1 % (ref 4–15)
NEUTROPHILS NFR BLD: 91 % (ref 38–73)
NRBC BLD-RTO: 3 /100 WBC
OVALOCYTES BLD QL SMEAR: ABNORMAL
PLATELET # BLD AUTO: 150 K/UL (ref 150–450)
PMV BLD AUTO: 10 FL (ref 9.2–12.9)
POTASSIUM SERPL-SCNC: 3.4 MMOL/L (ref 3.5–5.1)
PROT SERPL-MCNC: 4.5 G/DL (ref 6–8.4)
RBC # BLD AUTO: 2.94 M/UL (ref 4.6–6.2)
SODIUM SERPL-SCNC: 140 MMOL/L (ref 136–145)
WBC # BLD AUTO: 18.27 K/UL (ref 3.9–12.7)

## 2023-10-28 PROCEDURE — 80053 COMPREHEN METABOLIC PANEL: CPT | Performed by: NURSE PRACTITIONER

## 2023-10-28 PROCEDURE — 85007 BL SMEAR W/DIFF WBC COUNT: CPT | Performed by: NURSE PRACTITIONER

## 2023-10-28 PROCEDURE — 43235 EGD DIAGNOSTIC BRUSH WASH: CPT | Mod: ,,, | Performed by: INTERNAL MEDICINE

## 2023-10-28 PROCEDURE — 43235 EGD DIAGNOSTIC BRUSH WASH: CPT | Performed by: INTERNAL MEDICINE

## 2023-10-28 PROCEDURE — 25000003 PHARM REV CODE 250: Performed by: FAMILY MEDICINE

## 2023-10-28 PROCEDURE — 21400001 HC TELEMETRY ROOM

## 2023-10-28 PROCEDURE — 63600175 PHARM REV CODE 636 W HCPCS: Performed by: FAMILY MEDICINE

## 2023-10-28 PROCEDURE — C9113 INJ PANTOPRAZOLE SODIUM, VIA: HCPCS | Performed by: FAMILY MEDICINE

## 2023-10-28 PROCEDURE — 94761 N-INVAS EAR/PLS OXIMETRY MLT: CPT

## 2023-10-28 PROCEDURE — S0179 MEGESTROL 20 MG: HCPCS | Performed by: NURSE PRACTITIONER

## 2023-10-28 PROCEDURE — 25000003 PHARM REV CODE 250: Performed by: INTERNAL MEDICINE

## 2023-10-28 PROCEDURE — 85027 COMPLETE CBC AUTOMATED: CPT | Performed by: NURSE PRACTITIONER

## 2023-10-28 PROCEDURE — 45330 DIAGNOSTIC SIGMOIDOSCOPY: CPT | Performed by: INTERNAL MEDICINE

## 2023-10-28 PROCEDURE — 25000003 PHARM REV CODE 250: Performed by: NURSE PRACTITIONER

## 2023-10-28 PROCEDURE — 25000242 PHARM REV CODE 250 ALT 637 W/ HCPCS: Performed by: FAMILY MEDICINE

## 2023-10-28 PROCEDURE — 27000221 HC OXYGEN, UP TO 24 HOURS

## 2023-10-28 PROCEDURE — 85018 HEMOGLOBIN: CPT | Performed by: INTERNAL MEDICINE

## 2023-10-28 PROCEDURE — 25000003 PHARM REV CODE 250: Performed by: ANESTHESIOLOGY

## 2023-10-28 PROCEDURE — 99900035 HC TECH TIME PER 15 MIN (STAT)

## 2023-10-28 PROCEDURE — 45330 DIAGNOSTIC SIGMOIDOSCOPY: CPT | Mod: 51,,, | Performed by: INTERNAL MEDICINE

## 2023-10-28 PROCEDURE — 37000008 HC ANESTHESIA 1ST 15 MINUTES: Performed by: INTERNAL MEDICINE

## 2023-10-28 PROCEDURE — 94640 AIRWAY INHALATION TREATMENT: CPT

## 2023-10-28 PROCEDURE — 85014 HEMATOCRIT: CPT | Performed by: INTERNAL MEDICINE

## 2023-10-28 PROCEDURE — 43235 PR EGD, FLEX, DIAGNOSTIC: ICD-10-PCS | Mod: ,,, | Performed by: INTERNAL MEDICINE

## 2023-10-28 PROCEDURE — 63600175 PHARM REV CODE 636 W HCPCS: Performed by: ANESTHESIOLOGY

## 2023-10-28 PROCEDURE — 45330 PR SIGMOIDOSCOPY,DIAG2STIC: ICD-10-PCS | Mod: 51,,, | Performed by: INTERNAL MEDICINE

## 2023-10-28 RX ORDER — DEXTROMETHORPHAN/PSEUDOEPHED 2.5-7.5/.8
DROPS ORAL
Status: COMPLETED | OUTPATIENT
Start: 2023-10-28 | End: 2023-10-28

## 2023-10-28 RX ORDER — SODIUM CHLORIDE 9 MG/ML
INJECTION, SOLUTION INTRAVENOUS CONTINUOUS
Status: DISCONTINUED | OUTPATIENT
Start: 2023-10-28 | End: 2023-10-28

## 2023-10-28 RX ORDER — SUCCINYLCHOLINE CHLORIDE 20 MG/ML
INJECTION INTRAMUSCULAR; INTRAVENOUS
Status: DISCONTINUED
Start: 2023-10-28 | End: 2023-10-28 | Stop reason: WASHOUT

## 2023-10-28 RX ORDER — LIDOCAINE HYDROCHLORIDE 10 MG/ML
INJECTION INFILTRATION; PERINEURAL
Status: DISCONTINUED | OUTPATIENT
Start: 2023-10-28 | End: 2023-10-28

## 2023-10-28 RX ORDER — PROPOFOL 10 MG/ML
VIAL (ML) INTRAVENOUS
Status: DISCONTINUED | OUTPATIENT
Start: 2023-10-28 | End: 2023-10-28

## 2023-10-28 RX ORDER — MUPIROCIN 20 MG/G
OINTMENT TOPICAL 2 TIMES DAILY
Status: DISCONTINUED | OUTPATIENT
Start: 2023-10-28 | End: 2023-10-29 | Stop reason: HOSPADM

## 2023-10-28 RX ADMIN — METRONIDAZOLE 500 MG: 5 INJECTION, SOLUTION INTRAVENOUS at 11:10

## 2023-10-28 RX ADMIN — PROPOFOL 40 MG: 10 INJECTION, EMULSION INTRAVENOUS at 10:10

## 2023-10-28 RX ADMIN — METRONIDAZOLE 500 MG: 5 INJECTION, SOLUTION INTRAVENOUS at 01:10

## 2023-10-28 RX ADMIN — IPRATROPIUM BROMIDE AND ALBUTEROL SULFATE 3 ML: 2.5; .5 SOLUTION RESPIRATORY (INHALATION) at 01:10

## 2023-10-28 RX ADMIN — LIDOCAINE HYDROCHLORIDE 50 MG: 10 INJECTION, SOLUTION INFILTRATION; PERINEURAL at 10:10

## 2023-10-28 RX ADMIN — PROPOFOL 20 MG: 10 INJECTION, EMULSION INTRAVENOUS at 10:10

## 2023-10-28 RX ADMIN — IPRATROPIUM BROMIDE AND ALBUTEROL SULFATE 3 ML: 2.5; .5 SOLUTION RESPIRATORY (INHALATION) at 07:10

## 2023-10-28 RX ADMIN — SUCRALFATE ORAL 1 G: 1 SUSPENSION ORAL at 12:10

## 2023-10-28 RX ADMIN — METHYLPREDNISOLONE SODIUM SUCCINATE 40 MG: 40 INJECTION, POWDER, FOR SOLUTION INTRAMUSCULAR; INTRAVENOUS at 09:10

## 2023-10-28 RX ADMIN — MUPIROCIN: 20 OINTMENT TOPICAL at 10:10

## 2023-10-28 RX ADMIN — MUPIROCIN: 20 OINTMENT TOPICAL at 09:10

## 2023-10-28 RX ADMIN — METRONIDAZOLE 500 MG: 5 INJECTION, SOLUTION INTRAVENOUS at 05:10

## 2023-10-28 RX ADMIN — METRONIDAZOLE 500 MG: 5 INJECTION, SOLUTION INTRAVENOUS at 09:10

## 2023-10-28 RX ADMIN — SUCRALFATE ORAL 1 G: 1 SUSPENSION ORAL at 11:10

## 2023-10-28 RX ADMIN — SUCRALFATE ORAL 1 G: 1 SUSPENSION ORAL at 05:10

## 2023-10-28 RX ADMIN — PANTOPRAZOLE SODIUM 40 MG: 40 INJECTION, POWDER, FOR SOLUTION INTRAVENOUS at 09:10

## 2023-10-28 RX ADMIN — ATORVASTATIN CALCIUM 40 MG: 40 TABLET, FILM COATED ORAL at 09:10

## 2023-10-28 RX ADMIN — CEFTRIAXONE 2 G: 2 INJECTION, POWDER, FOR SOLUTION INTRAMUSCULAR; INTRAVENOUS at 10:10

## 2023-10-28 RX ADMIN — MELATONIN TAB 3 MG 6 MG: 3 TAB at 09:10

## 2023-10-28 RX ADMIN — MONTELUKAST 10 MG: 10 TABLET, FILM COATED ORAL at 09:10

## 2023-10-28 RX ADMIN — HYDROXYCHLOROQUINE SULFATE 200 MG: 200 TABLET, FILM COATED ORAL at 10:10

## 2023-10-28 RX ADMIN — TAMSULOSIN HYDROCHLORIDE 0.4 MG: 0.4 CAPSULE ORAL at 10:10

## 2023-10-28 RX ADMIN — HYDROCODONE BITARTRATE AND ACETAMINOPHEN 1 TABLET: 5; 325 TABLET ORAL at 09:10

## 2023-10-28 RX ADMIN — MEGESTROL ACETATE 400 MG: 40 SUSPENSION ORAL at 11:10

## 2023-10-28 RX ADMIN — HYDROCODONE BITARTRATE AND ACETAMINOPHEN 1 TABLET: 5; 325 TABLET ORAL at 02:10

## 2023-10-28 NOTE — ASSESSMENT & PLAN NOTE
This patient does have evidence of infective focus  My overall impression is sepsis.  Source: Unknown  Antibiotics given-   Antibiotics (72h ago, onward)    Start     Stop Route Frequency Ordered    10/28/23 0900  mupirocin 2 % ointment         11/02/23 0859 Nasl 2 times daily 10/28/23 0654    10/26/23 0845  cefTRIAXone (ROCEPHIN) 2 g in dextrose 5 % in water (D5W) 100 mL IVPB (MB+)         -- IV Every 24 hours (non-standard times) 10/26/23 0742    10/26/23 0845  metronidazole IVPB 500 mg         -- IV Every 8 hours (non-standard times) 10/26/23 0742        Latest lactate reviewed-  Recent Labs   Lab 10/25/23  2144 10/26/23  0642 10/27/23  1713   LACTATE 6.6* 5.9* 2.5*       Fluid challenge Other- Patient to receive 1 liter volume other than 30cc/kg due to Congestive Heart Failure     Post- resuscitation assessment No - Post resuscitation assessment not needed       Will Not start Pressors- Levophed for MAP of 65  Source control achieved by: Abx, ivf,     Abdominal source vs pneumonia  Lactate remains elevated  Add antibiotic(s) to cover pneumonia and abdominal source  Received one time dose zosyn in emergency department   Continue fluids

## 2023-10-28 NOTE — ASSESSMENT & PLAN NOTE
- recieved 2 units 10/27 with good response  ---- continue to montior  - GI performing endoscopy this morning  - closely monitor pace and character of stools  - received IV Vit K x 1 for INR 1.4  - continue IV PPI BID  - follow-up GI recommendations

## 2023-10-28 NOTE — INTERVAL H&P NOTE
The patient has been examined and the H&P has been reviewed:    I concur with the findings and changes have been noted since the H&P was written: continued to have melena overnight. Will start with flex sig. If negative, will proceed with EGD.    Anesthesia/Surgery risks, benefits and alternative options discussed and understood by patient/family.    The risks, benefits and alternatives of the procedure were discussed with the patient in detail. This discussion was had in the presence of ICU/endoscopy staff. The risks include, risks of adverse reaction to sedation requiring the use of reversal agents, bleeding requiring blood transfusion, perforation requiring surgical intervention and technical failure. Other risks include aspiration leading to respiratory distress and respiratory failure resulting in endotracheal intubation and mechanical ventilation including death. If anesthesia is being utilized for this procedure, it is up to the anesthesiologist to determine airway safety including elective endotracheal intubation. Questions were answered, they agree to proceed. There was no language barriers.        Active Hospital Problems    Diagnosis  POA    *Sepsis [A41.9]  Yes    Melena [K92.1]  No     Priority: 1 - High    Chronic anemia [D64.9]  Yes     Priority: 2      Anemia  Anemia      Constipation [K59.00]  Yes     Priority: 3     Gastroesophageal reflux disease [K21.9]  Yes    Chronic heart failure with preserved ejection fraction [I50.32]  Yes    Essential hypertension [I10]  Yes    Other hyperlipidemia [E78.49]  Yes    Rheumatoid arthritis [M06.9]  Yes    Type 2 diabetes mellitus [E11.9]  Yes    COPD (chronic obstructive pulmonary disease) [J44.9]  Yes      Resolved Hospital Problems   No resolved problems to display.

## 2023-10-28 NOTE — CONSULTS
[facility] ([facility])  Hospital Medicine  Consult Note    Patient Name: Navdeep Sesay Jr.  MRN: [id number]  Admission Date: 10/25/2023  Hospital Length of Stay: 3 days  Attending Physician: Mt Davis MD   Primary Care Provider: [facility], [facility]           Patient information was obtained from patient, relative(s) and ER records.     Consults  Subjective:     Principal Problem: Sepsis    Chief Complaint:   Chief Complaint   Patient presents with    Constipation     Pt states he has been having constipation x 3 days and also SOB. Pt is normally on 3 LPM at home and is currently o2 stat is 100 on room air        HPI: Navdeep Sesay Jr. is a 78 y.o. male with a PMH  has a past medical history of Aortic stenosis, Chronic anemia, COPD (chronic obstructive pulmonary disease), Hypertension, and Rheumatoid arthritis.    Presented for constipation. Improved after enema but with bright red blood. Receives chronic transfusions outpatient for anemia. Workup unrevealing per step daughter but now requiring more frequent blood transfusions outpatient. Family reports black stools but also takes fe supplemental.     Patient received 2 units while on floor but hb/hct continued to downtrend with active melena. Imaging without GI bleed identified, both nuclear medicine tagged red blood cell and Cta GI bleed. Flexsig with no bleeding source found. Gastroenterology recommending video capsule endoscopy while inpatient. Vital signs stable after receiving 2 additional units of blood.    Hospital medicine consulted for assumption of care.      Past Medical History:   Diagnosis Date    Aortic stenosis     Chronic anemia     COPD (chronic obstructive pulmonary disease)     Hypertension     Rheumatoid arthritis        History reviewed. No pertinent surgical history.    Review of patient's allergies indicates:  No Known Allergies    No current facility-administered medications on file prior to encounter.      Current Outpatient Medications on File Prior to Encounter   Medication Sig    alogliptin (NESINA) 25 mg Tab Take 1 tablet by mouth once daily.    atorvastatin (LIPITOR) 80 MG tablet Take 40 mg by mouth every evening.    calcium carbonate (OS-DANIA) 500 mg calcium (1,250 mg) tablet Take 1,250 mg by mouth once daily.    cetirizine (ZYRTEC) 10 MG tablet Take 10 mg by mouth once daily.    fluticasone-salmeterol diskus inhaler 250-50 mcg Inhale 1 puff into the lungs 2 (two) times daily. Controller    folic acid (FOLVITE) 1 MG tablet Take 1 mg by mouth once daily.    furosemide (LASIX) 40 MG tablet Take 1 tablet (40 mg total) by mouth once daily.    guaiFENesin (MUCINEX) 600 mg 12 hr tablet Take 1,200 mg by mouth every 12 (twelve) hours.    hydroxychloroquine (PLAQUENIL) 200 mg tablet Take by mouth once daily.    megestroL (MEGACE) 400 mg/10 mL (40 mg/mL) Susp Take 400 mg by mouth once daily.    methotrexate 2.5 MG Tab Take 25 mg by mouth every 7 days. (Mondays)    montelukast (SINGULAIR) 10 mg tablet Take 10 mg by mouth every evening.    omeprazole (PRILOSEC) 20 MG capsule Take 20 mg by mouth once daily.    predniSONE (DELTASONE) 5 MG tablet Take 5 mg by mouth once daily.    tamsulosin (FLOMAX) 0.4 mg Cap Take 0.4 mg by mouth once daily.    traMADoL (ULTRAM) 50 mg tablet Take 100 mg by mouth 2 (two) times daily as needed for Pain.    adalimumab (HUMIRA) 10 mg/0.2 mL SyKt Inject 10 mg into the skin every 14 (fourteen) days.    albuterol (ACCUNEB) 1.25 mg/3 mL Nebu Take 1.25 mg by nebulization every 6 (six) hours as needed. Rescue    amLODIPine (NORVASC) 10 MG tablet Take 10 mg by mouth once daily.    losartan (COZAAR) 100 MG tablet Take 100 mg by mouth once daily.     Family History       Problem Relation (Age of Onset)    No Known Problems Mother, Father          Tobacco Use    Smoking status: Never    Smokeless tobacco: Never   Substance and Sexual Activity    Alcohol use: Never    Drug use:  Never    Sexual activity: Not Currently     Review of Systems   Constitutional:  Positive for fatigue. Negative for activity change, appetite change and fever.   Respiratory:  Positive for shortness of breath.    Cardiovascular:  Negative for chest pain.   Gastrointestinal:  Positive for abdominal distention and blood in stool. Negative for abdominal pain, constipation, nausea and vomiting.   Allergic/Immunologic: Positive for immunocompromised state.   Neurological:  Positive for weakness.   Psychiatric/Behavioral:  Positive for decreased concentration. Negative for agitation, behavioral problems, confusion and dysphoric mood. The patient is not nervous/anxious.      Objective:     Vital Signs (Most Recent):  Temp: 97.8 °F (36.6 °C) (10/28/23 0701)  Pulse: 95 (10/28/23 1344)  Resp: (!) 23 (10/28/23 1439)  BP: (!) 111/57 (10/28/23 0946)  SpO2: 100 % (10/28/23 0946) Vital Signs (24h Range):  Temp:  [97.8 °F (36.6 °C)-98.9 °F (37.2 °C)] 97.8 °F (36.6 °C)  Pulse:  [] 95  Resp:  [16-28] 23  SpO2:  [98 %-100 %] 100 %  BP: ()/(52-69) 111/57     Weight: 63.1 kg (139 lb 1.8 oz)  Body mass index is 19.4 kg/m².     Physical Exam  Vitals and nursing note reviewed. Exam conducted with a chaperone present (nursing, step daughter).   Constitutional:       General: He is not in acute distress.     Appearance: He is underweight. He is ill-appearing. He is not toxic-appearing.      Interventions: Nasal cannula in place.   HENT:      Head: Normocephalic and atraumatic.   Cardiovascular:      Rate and Rhythm: Normal rate.   Pulmonary:      Effort: Tachypnea and respiratory distress present.   Abdominal:      General: There is distension.      Palpations: Abdomen is soft.      Tenderness: There is no abdominal tenderness.   Skin:     General: Skin is warm.      Capillary Refill: Capillary refill takes 2 to 3 seconds.      Coloration: Skin is pale.   Neurological:      Mental Status: He is alert. Mental status is at  baseline.      Motor: Weakness present.          Significant Labs: All pertinent labs within the past 24 hours have been reviewed.  CBC:   Recent Labs   Lab 10/27/23  0852 10/27/23  2220 10/28/23  0452   WBC  --   --  18.27*   HGB 6.9* 8.9* 8.6*   HCT 21.3* 26.7* 25.5*   PLT  --   --  150     CMP:   Recent Labs   Lab 10/28/23  0452      K 3.4*   *   CO2 18*   *   BUN 37*   CREATININE 1.2   CALCIUM 7.4*   PROT 4.5*   ALBUMIN 2.2*   BILITOT 0.4   ALKPHOS 43*   AST 99*   *   ANIONGAP 11       Significant Imaging: I have reviewed all pertinent imaging results/findings within the past 24 hours.  Flexsig without GI bleed source detected    Assessment/Plan:     * Sepsis  This patient does have evidence of infective focus  My overall impression is sepsis.  Source: Unknown  Antibiotics given-   Antibiotics (72h ago, onward)    Start     Stop Route Frequency Ordered    10/28/23 0900  mupirocin 2 % ointment         11/02/23 0859 Nasl 2 times daily 10/28/23 0654    10/26/23 0845  cefTRIAXone (ROCEPHIN) 2 g in dextrose 5 % in water (D5W) 100 mL IVPB (MB+)         -- IV Every 24 hours (non-standard times) 10/26/23 0742    10/26/23 0845  metronidazole IVPB 500 mg         -- IV Every 8 hours (non-standard times) 10/26/23 0742        Latest lactate reviewed-  Recent Labs   Lab 10/25/23  2144 10/26/23  0642 10/27/23  1713   LACTATE 6.6* 5.9* 2.5*       Fluid challenge Other- Patient to receive 1 liter volume other than 30cc/kg due to Congestive Heart Failure     Post- resuscitation assessment No - Post resuscitation assessment not needed       Will Not start Pressors- Levophed for MAP of 65  Source control achieved by: Abx, ivf,     Abdominal source vs pneumonia  Lactate remains elevated  Add antibiotic(s) to cover pneumonia and abdominal source  Received one time dose zosyn in emergency department   Continue fluids    Melena  Gastroenterology consulted   Awaiting video capsule endoscopy    Gastroesophageal  reflux disease  Chronic. Stable. Currently asymptomatic. Home medications include PPI/Antacids as needed.  Plan:  -Continue PPI/Antacids as needed     Intravenous proton pump inhibitor     Chronic anemia  Unknown baseline without evidence of active bleeding noted.   Recent Labs   Lab 10/25/23  1509 10/26/23  0537 10/26/23  2027 10/27/23  0852 10/27/23  2220 10/28/23  0452   HGB 8.6* 5.0*   < > 6.9* 8.9* 8.6*   HCT 27.3* 16.0*   < > 21.3* 26.7* 25.5*   MCV 93 95  --   --   --  87   MCHC 31.5* 31.3*  --   --   --  33.7   RDW 17.1* 17.4*  --   --   --  15.9*    283  --   --   --  150    < > = values in this interval not displayed.     Plan:  -Monitor H/H and plts  -Type and screen, transfuse as needed   -Continue home medications   -Hold antiplatelets/anticoagulants in setting of active bleeding/pending surgical intervention       Hb/hct stable  Received 4 units prbcs  No source identified on imaging, cta GI bleed or nucmed scan  No source identified on flexsig  Gastroenterology recommending video capsule endoscopy while inpatient   May need interventional radiology       Constipation  Received enema in ED. No BM as of yet.  Plan:  -monitor for BM  -stool softeners    Large bowel movement with bright red blood  Multiple bowel movements after enema  Hb/hct pending  Received blood transfusion   GI bleed consult for possible flex significant    Resolved     Chronic heart failure with preserved ejection fraction  Patient is identified as having unspecified heart failure that is Chronic. CHF is currently controlled. Latest ECHO performed and demonstrates- Results for orders placed during the hospital encounter of 08/10/23    Echo    Interpretation Summary    Left Ventricle: There is low normal systolic function with a visually estimated ejection fraction of 50 - 55%. Grade I diastolic dysfunction.    Right Ventricle: Normal right ventricular cavity size. Wall thickness is normal. Right ventricle wall motion  is  "normal. Systolic function is normal.    Aortic Valve: The aortic valve is a trileaflet valve. There is moderate aortic valve sclerosis. There is moderate to severe stenosis. Aortic valve area by VTI is 0.83 cm². Aortic valve peak velocity is 3.62 m/s. Mean gradient is 36 mmHg. The dimensionless index is 0.27. There is mild aortic regurgitation with a centrally directed jet.    Mitral Valve: There is mild bileaflet sclerosis. There is moderate to severe stenosis. The mean pressure gradient across the mitral valve is 10 mmHg at a heart rate of  bpm. There is mild regurgitation with a centrally directed jet.    Tricuspid Valve: There is normal leaflet mobility. There is mild regurgitation with a centrally directed jet.  . Continue ACE/ARB Furosemide and monitor clinical status closely. Monitor on telemetry. Patient is off CHF pathway.  Monitor strict Is&Os and daily weights.  Place on fluid restriction of 2 L. Continue to stress to patient importance of self efficacy and  on diet for CHF. Last BNP reviewed- and noted below   Recent Labs   Lab 10/25/23  1509   *   .  Gentle intravenous fluids  Monitor for fluid overload symptoms with lower extremity edema and worsening dyspnea       COPD (chronic obstructive pulmonary disease)  Patient's COPD is controlled currently.  Patient is currently off COPD Pathway. Continue scheduled inhalers duonebs prn, Steroids, Antibiotics and Supplemental oxygen and monitor respiratory status closely.     cta negative for pulmonary embolism  Significant emphysema   Necrotic mass left lung  chronically on 3L  Mildly elevated procalcitonin   Intravenous rocephin  Steroid burst      Type 2 diabetes mellitus  Patient's FSGs are uncontrolled due to hyperglycemia on current medication regimen.  Last A1c reviewed-   Lab Results   Component Value Date    HGBA1C 7.5 (H) 09/02/2021     Most recent fingerstick glucose reviewed- No results for input(s): "POCTGLUCOSE" in the last 24 " "hours.  Current correctional scale  Low  Maintain anti-hyperglycemic dose as follows-   Antihyperglycemics (From admission, onward)    None        Most recent A1c measuring   Hemoglobin A1C   Date Value Ref Range Status   09/02/2021 7.5 (H) <=6.5 % Final      Plan:  -SSI  -Accu-checks   -Hypoglycemic protocol   -Hold any oral antihyperglycemics while inpatient     Controlled  Will relax normotensive/normoglycemic range in this geriatric population          Rheumatoid arthritis  Voices no complaints. Compliant with home meds.  Plan:  -continue home meds  On steroids chronically  Steroid burst    Other hyperlipidemia  Patient is chronically on statin.will continue for now. Last Lipid Panel: No results found for: "CHOL", "HDL", "LDLCALC", "TRIG", "CHOLHDL"  Plan:  -Continue home medication  -low fat/low calorie diet      Essential hypertension  Currently normotensive.BP usually well controlled per patient with home medications.  Plan:  -Optimize pain control   -Continue home medications (lasix, losartan, norvasc), titrate as needed   -Monitor BP  -Low salt/cardiac diet when not NPO  -IV hydralazine prn for SBP>160 or DBP>90       Hypotension improving  Steroid burst  Continuous fluids            VTE Risk Mitigation (From admission, onward)         Ordered     Reason for No Pharmacological VTE Prophylaxis  Once        Question:  Reasons:  Answer:  Physician Provided (leave comment)    10/25/23 1936     IP VTE HIGH RISK PATIENT  Once         10/25/23 1936     Place sequential compression device  Until discontinued         10/25/23 1936                Critical care time spent on the evaluation and treatment of severe organ dysfunction, review of pertinent labs and imaging studies, discussions with consulting providers and discussions with patient/family: 49 minutes.    Thank you for your consult. I will follow-up with patient. Please contact us if you have any additional questions.    Michele Vang MD  Department of " Hospital Medicine   [facility] ([facility])

## 2023-10-28 NOTE — ASSESSMENT & PLAN NOTE
Patient's COPD is controlled currently.  Patient is currently off COPD Pathway. Continue scheduled inhalers duonebs prn, Steroids, Antibiotics and Supplemental oxygen and monitor respiratory status closely.     cta negative for pulmonary embolism  Significant emphysema   Necrotic mass left lung  chronically on 3L  Mildly elevated procalcitonin   Intravenous rocephin  Steroid burst

## 2023-10-28 NOTE — ASSESSMENT & PLAN NOTE
Received enema in ED. No BM as of yet.  Plan:  -monitor for BM  -stool softeners    Large bowel movement with bright red blood  Multiple bowel movements after enema  Hb/hct pending  Received blood transfusion   GI bleed consult for possible flex significant    Resolved

## 2023-10-28 NOTE — SUBJECTIVE & OBJECTIVE
Past Medical History:   Diagnosis Date    Aortic stenosis     Chronic anemia     COPD (chronic obstructive pulmonary disease)     Hypertension     Rheumatoid arthritis        History reviewed. No pertinent surgical history.    Review of patient's allergies indicates:  No Known Allergies    No current facility-administered medications on file prior to encounter.     Current Outpatient Medications on File Prior to Encounter   Medication Sig    alogliptin (NESINA) 25 mg Tab Take 1 tablet by mouth once daily.    atorvastatin (LIPITOR) 80 MG tablet Take 40 mg by mouth every evening.    calcium carbonate (OS-DANIA) 500 mg calcium (1,250 mg) tablet Take 1,250 mg by mouth once daily.    cetirizine (ZYRTEC) 10 MG tablet Take 10 mg by mouth once daily.    fluticasone-salmeterol diskus inhaler 250-50 mcg Inhale 1 puff into the lungs 2 (two) times daily. Controller    folic acid (FOLVITE) 1 MG tablet Take 1 mg by mouth once daily.    furosemide (LASIX) 40 MG tablet Take 1 tablet (40 mg total) by mouth once daily.    guaiFENesin (MUCINEX) 600 mg 12 hr tablet Take 1,200 mg by mouth every 12 (twelve) hours.    hydroxychloroquine (PLAQUENIL) 200 mg tablet Take by mouth once daily.    megestroL (MEGACE) 400 mg/10 mL (40 mg/mL) Susp Take 400 mg by mouth once daily.    methotrexate 2.5 MG Tab Take 25 mg by mouth every 7 days. (Mondays)    montelukast (SINGULAIR) 10 mg tablet Take 10 mg by mouth every evening.    omeprazole (PRILOSEC) 20 MG capsule Take 20 mg by mouth once daily.    predniSONE (DELTASONE) 5 MG tablet Take 5 mg by mouth once daily.    tamsulosin (FLOMAX) 0.4 mg Cap Take 0.4 mg by mouth once daily.    traMADoL (ULTRAM) 50 mg tablet Take 100 mg by mouth 2 (two) times daily as needed for Pain.    adalimumab (HUMIRA) 10 mg/0.2 mL SyKt Inject 10 mg into the skin every 14 (fourteen) days.    albuterol (ACCUNEB) 1.25 mg/3 mL Nebu Take 1.25 mg by nebulization every 6 (six) hours as needed. Rescue    amLODIPine (NORVASC) 10 MG  tablet Take 10 mg by mouth once daily.    losartan (COZAAR) 100 MG tablet Take 100 mg by mouth once daily.     Family History       Problem Relation (Age of Onset)    No Known Problems Mother, Father          Tobacco Use    Smoking status: Never    Smokeless tobacco: Never   Substance and Sexual Activity    Alcohol use: Never    Drug use: Never    Sexual activity: Not Currently     Review of Systems   Constitutional:  Positive for fatigue. Negative for activity change, appetite change and fever.   Respiratory:  Positive for shortness of breath.    Cardiovascular:  Negative for chest pain.   Gastrointestinal:  Positive for abdominal distention and blood in stool. Negative for abdominal pain, constipation, nausea and vomiting.   Allergic/Immunologic: Positive for immunocompromised state.   Neurological:  Positive for weakness.   Psychiatric/Behavioral:  Positive for decreased concentration. Negative for agitation, behavioral problems, confusion and dysphoric mood. The patient is not nervous/anxious.      Objective:     Vital Signs (Most Recent):  Temp: 97.8 °F (36.6 °C) (10/28/23 0701)  Pulse: 95 (10/28/23 1344)  Resp: (!) 23 (10/28/23 1439)  BP: (!) 111/57 (10/28/23 0946)  SpO2: 100 % (10/28/23 0946) Vital Signs (24h Range):  Temp:  [97.8 °F (36.6 °C)-98.9 °F (37.2 °C)] 97.8 °F (36.6 °C)  Pulse:  [] 95  Resp:  [16-28] 23  SpO2:  [98 %-100 %] 100 %  BP: ()/(52-69) 111/57     Weight: 63.1 kg (139 lb 1.8 oz)  Body mass index is 19.4 kg/m².     Physical Exam  Vitals and nursing note reviewed. Exam conducted with a chaperone present (nursing, step daughter).   Constitutional:       General: He is not in acute distress.     Appearance: He is underweight. He is ill-appearing. He is not toxic-appearing.      Interventions: Nasal cannula in place.   HENT:      Head: Normocephalic and atraumatic.   Cardiovascular:      Rate and Rhythm: Normal rate.   Pulmonary:      Effort: Tachypnea and respiratory distress  present.   Abdominal:      General: There is distension.      Palpations: Abdomen is soft.      Tenderness: There is no abdominal tenderness.   Skin:     General: Skin is warm.      Capillary Refill: Capillary refill takes 2 to 3 seconds.      Coloration: Skin is pale.   Neurological:      Mental Status: He is alert. Mental status is at baseline.      Motor: Weakness present.          Significant Labs: All pertinent labs within the past 24 hours have been reviewed.  CBC:   Recent Labs   Lab 10/27/23  0852 10/27/23  2220 10/28/23  0452   WBC  --   --  18.27*   HGB 6.9* 8.9* 8.6*   HCT 21.3* 26.7* 25.5*   PLT  --   --  150     CMP:   Recent Labs   Lab 10/28/23  0452      K 3.4*   *   CO2 18*   *   BUN 37*   CREATININE 1.2   CALCIUM 7.4*   PROT 4.5*   ALBUMIN 2.2*   BILITOT 0.4   ALKPHOS 43*   AST 99*   *   ANIONGAP 11       Significant Imaging: I have reviewed all pertinent imaging results/findings within the past 24 hours.  Flexsig without GI bleed source detected

## 2023-10-28 NOTE — HOSPITAL COURSE
- transferred to ICU from the floor for ongoing melena and relative hypotension  - endoscopy today, received 2 units of blood after arrival yesterday.  BP and oxygenation stable.

## 2023-10-28 NOTE — PROVATION PATIENT INSTRUCTIONS
Discharge Summary/Instructions after an Endoscopic Procedure  Patient Name: Navdeep Sesay  Patient MRN: [id number]  Patient YOB: 1945 Saturday, October 28, 2023 Angela Crockett MD  Dear patient,  As a result of recent federal legislation (The Federal Cures Act), you may   receive lab or pathology results from your procedure in your [facility]   account before your physician is able to contact you. Your physician or   their representative will relay the results to you with their   recommendations at their soonest availability.  Thank you,  RESTRICTIONS:  During your procedure today, you received medications for sedation.  These   medications may affect your judgment, balance and coordination.  Therefore,   for 24 hours, you have the following restrictions:   - DO NOT drive a car, operate machinery, make legal/financial decisions,   sign important papers or drink alcohol.    ACTIVITY:  Today: no heavy lifting, straining or running due to procedural   sedation/anesthesia.  The following day: return to full activity including work.  DIET:  Eat and drink normally unless instructed otherwise.     TREATMENT FOR COMMON SIDE EFFECTS:  - Mild abdominal pain, nausea, belching, bloating or excessive gas:  rest,   eat lightly and use a heating pad.  - Sore Throat: treat with throat lozenges and/or gargle with warm salt   water.  - Because air was used during the procedure, expelling large amounts of air   from your rectum or belching is normal.  - If a bowel prep was taken, you may not have a bowel movement for 1-3 days.    This is normal.  SYMPTOMS TO WATCH FOR AND REPORT TO YOUR PHYSICIAN:  1. Abdominal pain or bloating, other than gas cramps.  2. Chest pain.  3. Back pain.  4. Signs of infection such as: chills or fever occurring within 24 hours   after the procedure.  5. Rectal bleeding, which would show as bright red, maroon, or black stools.   (A tablespoon of blood from the rectum is not serious, especially  if   hemorrhoids are present.)  6. Vomiting.  7. Weakness or dizziness.  GO DIRECTLY TO THE NEAREST EMERGENCY ROOM IF YOU HAVE ANY OF THE FOLLOWING:      Difficulty breathing              Chills and/or fever over 101 F   Persistent vomiting and/or vomiting blood   Severe abdominal pain   Severe chest pain   Black, tarry stools   Bleeding- more than one tablespoon   Any other symptom or condition that you feel may need urgent attention  Your doctor recommends these additional instructions:  If any biopsies were taken, your doctors clinic will contact you in 1 to 2   weeks with any results.  - Return patient to ICU for ongoing care.   - Clear liquid diet.   - Continue present medications.   - No aspirin, ibuprofen, naproxen, or other non-steroidal anti-inflammatory   drugs.   - Continue Pantoprazole at current dose.  - Communicated with ICU service.   - The findings and recommendations were discussed with the patient.   - The findings and recommendations were discussed with the patient's family.     - Check hemogram with white blood cell count and platelets q 8 hours for one   day.  For questions, problems or results please call your physician Angela Crockett MD at Work:  [phone]  If you have any questions about the above instructions, call the GI   department at [phone] or call the endoscopy unit at [phone]   from 7am until 3 pm.  [facility] [place], EMERGENCY ROOM PHONE NUMBER:   [phone]  IF A COMPLICATION OR EMERGENCY SITUATION ARISES AND YOU ARE UNABLE TO REACH   YOUR PHYSICIAN - GO DIRECTLY TO THE EMERGENCY ROOM.  I have read or have had read to me these discharge instructions for my   procedure and have received a written copy.  I understand these   instructions and will follow-up with my physician if I have any questions.     __________________________________       _____________________________________  Nurse Signature                                           Patient/Designated   Responsible Party Signature  MD Angela Roberts MD  10/28/2023 10:54:56 AM  This report has been verified and signed electronically.  Dear patient,  As a result of recent federal legislation (The Federal Cures Act), you may   receive lab or pathology results from your procedure in your [facility]   account before your physician is able to contact you. Your physician or   their representative will relay the results to you with their   recommendations at their soonest availability.  Thank you,  PROVATION

## 2023-10-28 NOTE — PROVATION PATIENT INSTRUCTIONS
Discharge Summary/Instructions after an Endoscopic Procedure  Patient Name: Navdeep Sesay  Patient MRN: [id number]  Patient YOB: 1945 Saturday, October 28, 2023 Angela Crockett MD  Dear patient,  As a result of recent federal legislation (The Federal Cures Act), you may   receive lab or pathology results from your procedure in your [facility]   account before your physician is able to contact you. Your physician or   their representative will relay the results to you with their   recommendations at their soonest availability.  Thank you,  RESTRICTIONS:  During your procedure today, you received medications for sedation.  These   medications may affect your judgment, balance and coordination.  Therefore,   for 24 hours, you have the following restrictions:   - DO NOT drive a car, operate machinery, make legal/financial decisions,   sign important papers or drink alcohol.    ACTIVITY:  Today: no heavy lifting, straining or running due to procedural   sedation/anesthesia.  The following day: return to full activity including work.  DIET:  Eat and drink normally unless instructed otherwise.     TREATMENT FOR COMMON SIDE EFFECTS:  - Mild abdominal pain, nausea, belching, bloating or excessive gas:  rest,   eat lightly and use a heating pad.  - Sore Throat: treat with throat lozenges and/or gargle with warm salt   water.  - Because air was used during the procedure, expelling large amounts of air   from your rectum or belching is normal.  - If a bowel prep was taken, you may not have a bowel movement for 1-3 days.    This is normal.  SYMPTOMS TO WATCH FOR AND REPORT TO YOUR PHYSICIAN:  1. Abdominal pain or bloating, other than gas cramps.  2. Chest pain.  3. Back pain.  4. Signs of infection such as: chills or fever occurring within 24 hours   after the procedure.  5. Rectal bleeding, which would show as bright red, maroon, or black stools.   (A tablespoon of blood from the rectum is not serious, especially  if   hemorrhoids are present.)  6. Vomiting.  7. Weakness or dizziness.  GO DIRECTLY TO THE NEAREST EMERGENCY ROOM IF YOU HAVE ANY OF THE FOLLOWING:      Difficulty breathing              Chills and/or fever over 101 F   Persistent vomiting and/or vomiting blood   Severe abdominal pain   Severe chest pain   Black, tarry stools   Bleeding- more than one tablespoon   Any other symptom or condition that you feel may need urgent attention  Your doctor recommends these additional instructions:  If any biopsies were taken, your doctors clinic will contact you in 1 to 2   weeks with any results.  - Return patient to ICU for ongoing care.   - NPO.   - Proceed with upper endoscopy.  - Communicated with ICU service.   - The findings and recommendations were discussed with the patient.  For questions, problems or results please call your physician Angela Crockett MD at Work:  [phone]  If you have any questions about the above instructions, call the GI   department at [phone] or call the endoscopy unit at [phone]   from 7am until 3 pm.  [facility] [place] [facility], EMERGENCY ROOM PHONE NUMBER:   [phone]  IF A COMPLICATION OR EMERGENCY SITUATION ARISES AND YOU ARE UNABLE TO REACH   YOUR PHYSICIAN - GO DIRECTLY TO THE EMERGENCY ROOM.  I have read or have had read to me these discharge instructions for my   procedure and have received a written copy.  I understand these   instructions and will follow-up with my physician if I have any questions.     __________________________________       _____________________________________  Nurse Signature                                          Patient/Designated   Responsible Party Signature  MD Angela Roberts MD  10/28/2023 11:00:06 AM  This report has been verified and signed electronically.  Dear patient,  As a result of recent federal legislation (The Federal Cures Act), you may   receive lab or pathology results from your procedure  in your [facility]   account before your physician is able to contact you. Your physician or   their representative will relay the results to you with their   recommendations at their soonest availability.  Thank you,  PROVATION

## 2023-10-28 NOTE — PROGRESS NOTES
[facility] - Intensive Care ([facility])  Critical Care Medicine  Progress Note    Patient Name: Navdeep Sesay Jr.  MRN: [id number]  Admission Date: 10/25/2023  Hospital Length of Stay: 3 days  Code Status: DNR  Attending Provider: Mt Davis MD  Primary Care Provider: Inessa Guzman   Principal Problem: Sepsis    Subjective:     HPI:  Mr Sesay is an 79 y/o WM with AS, chronic anemia (transfusion dependent), COPD on home O2, HTN, and RA who was initially admitted to the Hospitalist service on 10/25 for constipation.  Once he did have a BM, it was followed by quite a bit of blood.  This afternoon, he started passing more melena and BP started to get soft, and so he was transferred to the ICU.      I examined him shortly after arrival.  He says that he feels a bit dizzy at times and has some mild diffuse abdominal pain.  Deneis SOB above his baseline.  Denies CP, cough/sputum, change in urine, N/V, or any hematemesis.  SBP in the 90's and he is getting 1 of 2 planned units of blood.  CTA this afternoon is negative for active bleeding.      Hospital/ICU Course:  - transferred to ICU from the floor for ongoing melena and relative hypotension  - endoscopy today, received 2 units of blood after arrival yesterday.  BP and oxygenation stable.      Interval History:   No acute events overnight.  Had a couple BM, but reported as more brown.  Responded well to transfusion.  BP stable.    Planning endoscopy this morning.      Objective:     Vital Signs (Most Recent):  Temp: 97.8 °F (36.6 °C) (10/28/23 0701)  Pulse: 94 (10/28/23 0946)  Resp: (!) 25 (10/28/23 0946)  BP: (!) 111/57 (10/28/23 0946)  SpO2: 100 % (10/28/23 0946) Vital Signs (24h Range):  Temp:  [97.8 °F (36.6 °C)-99.5 °F (37.5 °C)] 97.8 °F (36.6 °C)  Pulse:  [] 94  Resp:  [16-28] 25  SpO2:  [98 %-100 %] 100 %  BP: ()/(52-69) 111/57     Weight: 63.1 kg (139 lb 1.8 oz)  Body mass index is 19.4 kg/m².      Intake/Output Summary (Last 24 hours)  at 10/28/2023 1029  Last data filed at 10/28/2023 0601  Gross per 24 hour   Intake 2423.91 ml   Output 250 ml   Net 2173.91 ml        Physical Exam  Vitals and nursing note reviewed.   Constitutional:       General: He is not in acute distress.  Cardiovascular:      Rate and Rhythm: Normal rate and regular rhythm.      Pulses: Normal pulses.      Heart sounds: No murmur heard.  Pulmonary:      Effort: Pulmonary effort is normal. No respiratory distress.      Breath sounds: No wheezing, rhonchi or rales.   Abdominal:      General: Abdomen is flat. There is no distension.      Palpations: Abdomen is soft.      Tenderness: There is abdominal tenderness (mild diffuse).   Musculoskeletal:      Right lower leg: No edema.      Left lower leg: No edema.   Skin:     Coloration: Skin is pale.   Neurological:      General: No focal deficit present.      Mental Status: He is alert. Mental status is at baseline.           Review of Systems    Vents:  Oxygen Concentration (%): 28 (10/28/23 0739)    Lines/Drains/Airways       Peripheral Intravenous Line  Duration                  Peripheral IV - Single Lumen 10/25/23 1659 20 G Left Antecubital 2 days         Midline Catheter Insertion/Assessment  - Single Lumen 10/27/23 1650 Left basilic vein (medial side of arm) <1 day                    Significant Labs:    CBC/Anemia Profile:  Recent Labs   Lab 10/27/23  0852 10/27/23  2220 10/28/23  0452   WBC  --   --  18.27*   HGB 6.9* 8.9* 8.6*   HCT 21.3* 26.7* 25.5*   PLT  --   --  150   MCV  --   --  87   RDW  --   --  15.9*        Chemistries:  Recent Labs   Lab 10/28/23  0452      K 3.4*   *   CO2 18*   BUN 37*   CREATININE 1.2   CALCIUM 7.4*   ALBUMIN 2.2*   PROT 4.5*   BILITOT 0.4   ALKPHOS 43*   *   AST 99*       All pertinent labs within the past 24 hours have been reviewed.    Significant Imaging:  I have reviewed all pertinent imaging results/findings within the past 24 hours.      ABG  Recent Labs   Lab  10/26/23  0802   PH 7.550*   PO2 120*   PCO2 20.4*   HCO3 17.8*   BE -5*     Assessment/Plan:     Pulmonary  COPD (chronic obstructive pulmonary disease)  Not in exacerbation  3L of Home O2 at baseline  Continue bronchodilators  Goal SpO2 > 88%    Cardiac/Vascular  Chronic heart failure with preserved ejection fraction  - closely monitor volume status with resuscitation    Other hyperlipidemia  Statin    Essential hypertension  Holding home meds acutely  Can restart, as necessary    ID  * Sepsis  - source unclear  - continue Abx for now  - cultures pending  - lactic improved this afternoon despite his bleeding    Immunology/Multi System  Rheumatoid arthritis  On chronic prednisone  - continue IV steroids  - continue home Plaquenil    Oncology  Chronic anemia  Gets periodic transfusions as an outpt  Closely monitoring H/H given bleeding    Endocrine  Type 2 diabetes mellitus  SSI/Accuchecks    GI  Gastroesophageal reflux disease  PPI    Constipation  Resolved    Melena  - recieved 2 units 10/27 with good response  ---- continue to montior  - GI performing endoscopy this morning  - closely monitor pace and character of stools  - received IV Vit K x 1 for INR 1.4  - continue IV PPI BID  - follow-up GI recommendations       Mt Davis MD  Critical Care Medicine  [facility] - Intensive Care ([facility])

## 2023-10-28 NOTE — ANESTHESIA POSTPROCEDURE EVALUATION
Anesthesia Post Evaluation    Patient: Navdeep Sesay JrApril    Procedure(s) Performed: Procedure(s) (LRB):  SIGMOIDOSCOPY, FLEXIBLE (N/A)  EGD (ESOPHAGOGASTRODUODENOSCOPY) (N/A)    Final Anesthesia Type: general      Patient location during evaluation: PACU  Patient participation: Yes- Able to Participate  Level of consciousness: awake and alert and oriented  Post-procedure vital signs: reviewed and stable  Pain management: adequate  Airway patency: patent    PONV status at discharge: No PONV  Anesthetic complications: no      Cardiovascular status: blood pressure returned to baseline, stable and hemodynamically stable  Respiratory status: unassisted  Hydration status: euvolemic  Follow-up not needed.          Vitals Value Taken Time   /55 10/28/23 1042   Temp 36.6 °C (97.8 °F) 10/28/23 0701   Pulse 94 10/28/23 1046   Resp 27 10/28/23 1046   SpO2 100 % 10/28/23 1046   Vitals shown include unvalidated device data.      No case tracking events are documented in the log.      Pain/Magdy Score: Pain Rating Prior to Med Admin: 5 (10/27/2023  9:07 PM)  Pain Rating Post Med Admin: 0 (10/27/2023  5:17 PM)

## 2023-10-28 NOTE — ASSESSMENT & PLAN NOTE
Unknown baseline without evidence of active bleeding noted.   Recent Labs   Lab 10/25/23  1509 10/26/23  0537 10/26/23  2027 10/27/23  0852 10/27/23  2220 10/28/23  0452   HGB 8.6* 5.0*   < > 6.9* 8.9* 8.6*   HCT 27.3* 16.0*   < > 21.3* 26.7* 25.5*   MCV 93 95  --   --   --  87   MCHC 31.5* 31.3*  --   --   --  33.7   RDW 17.1* 17.4*  --   --   --  15.9*    283  --   --   --  150    < > = values in this interval not displayed.     Plan:  -Monitor H/H and plts  -Type and screen, transfuse as needed   -Continue home medications   -Hold antiplatelets/anticoagulants in setting of active bleeding/pending surgical intervention       Hb/hct stable  Received 4 units prbcs  No source identified on imaging, cta GI bleed or nucmed scan  No source identified on flexsig  Gastroenterology recommending video capsule endoscopy while inpatient   May need interventional radiology

## 2023-10-28 NOTE — PLAN OF CARE
Discussed poc with pt and family, verbalized understanding     VS wnl  Fall precautions in place, remains injury free  Pt denies c/o n/v and pain     Bed locked at lowest position  Call light within reach     Chart check complete  Will cont with POC      Problem: Adult Inpatient Plan of Care  Goal: Plan of Care Review  Outcome: Ongoing, Progressing  Goal: Patient-Specific Goal (Individualized)  Outcome: Ongoing, Progressing  Goal: Absence of Hospital-Acquired Illness or Injury  Outcome: Ongoing, Progressing  Goal: Optimal Comfort and Wellbeing  Outcome: Ongoing, Progressing  Goal: Readiness for Transition of Care  Outcome: Ongoing, Progressing

## 2023-10-28 NOTE — SUBJECTIVE & OBJECTIVE
Interval History:   No acute events overnight.  Had a couple BM, but reported as more brown.  Responded well to transfusion.  BP stable.    Planning endoscopy this morning.      Objective:     Vital Signs (Most Recent):  Temp: 97.8 °F (36.6 °C) (10/28/23 0701)  Pulse: 94 (10/28/23 0946)  Resp: (!) 25 (10/28/23 0946)  BP: (!) 111/57 (10/28/23 0946)  SpO2: 100 % (10/28/23 0946) Vital Signs (24h Range):  Temp:  [97.8 °F (36.6 °C)-99.5 °F (37.5 °C)] 97.8 °F (36.6 °C)  Pulse:  [] 94  Resp:  [16-28] 25  SpO2:  [98 %-100 %] 100 %  BP: ()/(52-69) 111/57     Weight: 63.1 kg (139 lb 1.8 oz)  Body mass index is 19.4 kg/m².      Intake/Output Summary (Last 24 hours) at 10/28/2023 1029  Last data filed at 10/28/2023 0601  Gross per 24 hour   Intake 2423.91 ml   Output 250 ml   Net 2173.91 ml        Physical Exam  Vitals and nursing note reviewed.   Constitutional:       General: He is not in acute distress.  Cardiovascular:      Rate and Rhythm: Normal rate and regular rhythm.      Pulses: Normal pulses.      Heart sounds: No murmur heard.  Pulmonary:      Effort: Pulmonary effort is normal. No respiratory distress.      Breath sounds: No wheezing, rhonchi or rales.   Abdominal:      General: Abdomen is flat. There is no distension.      Palpations: Abdomen is soft.      Tenderness: There is abdominal tenderness (mild diffuse).   Musculoskeletal:      Right lower leg: No edema.      Left lower leg: No edema.   Skin:     Coloration: Skin is pale.   Neurological:      General: No focal deficit present.      Mental Status: He is alert. Mental status is at baseline.           Review of Systems    Vents:  Oxygen Concentration (%): 28 (10/28/23 0739)    Lines/Drains/Airways       Peripheral Intravenous Line  Duration                  Peripheral IV - Single Lumen 10/25/23 1659 20 G Left Antecubital 2 days         Midline Catheter Insertion/Assessment  - Single Lumen 10/27/23 1650 Left basilic vein (medial side of arm) <1  day                    Significant Labs:    CBC/Anemia Profile:  Recent Labs   Lab 10/27/23  0852 10/27/23  2220 10/28/23  0452   WBC  --   --  18.27*   HGB 6.9* 8.9* 8.6*   HCT 21.3* 26.7* 25.5*   PLT  --   --  150   MCV  --   --  87   RDW  --   --  15.9*        Chemistries:  Recent Labs   Lab 10/28/23  0452      K 3.4*   *   CO2 18*   BUN 37*   CREATININE 1.2   CALCIUM 7.4*   ALBUMIN 2.2*   PROT 4.5*   BILITOT 0.4   ALKPHOS 43*   *   AST 99*       All pertinent labs within the past 24 hours have been reviewed.    Significant Imaging:  I have reviewed all pertinent imaging results/findings within the past 24 hours.

## 2023-10-28 NOTE — NURSING
Patient transferred to telemetry room 205 via wheelchair. No distress noted. Safety and fall prevention measures are in place. Personal belongings transferred with the patient. Family present at bedside and aware of transfer. Care relinquished to TAMARA Mack at this time.

## 2023-10-28 NOTE — ANESTHESIA PREPROCEDURE EVALUATION
10/28/2023  Navdeep Sesay Jr. is a 78 y.o., male.    Past Medical History:   Diagnosis Date    Aortic stenosis     Chronic anemia     COPD (chronic obstructive pulmonary disease)     Hypertension     Rheumatoid arthritis      History reviewed. No pertinent surgical history.    Pre-op Assessment    I have reviewed the Patient Summary Reports.     I have reviewed the Nursing Notes. I have reviewed the NPO Status.   I have reviewed the Medications.     Review of Systems  Anesthesia Hx:  No problems with previous Anesthesia  History of prior surgery of interest to airway management or planning: Previous anesthesia: General Denies Family Hx of Anesthesia complications.   Denies Personal Hx of Anesthesia complications.   Social:  Non-Smoker    Hematology/Oncology:         -- Anemia:   Cardiovascular:   Hypertension Valvular problems/Murmurs, AS Mod to severe AS, .83  valve area with gradient 36.  Mod to severe MS.    Pulmonary:   COPD, moderate    Renal/:  Renal/ Normal     Hepatic/GI:   GERD GIB.   Musculoskeletal:   Arthritis     Neurological:  Neurology Normal    Endocrine:   Diabetes, type 2    Psych:  Psychiatric Normal           Physical Exam  General: Alert and Oriented    Airway:  Mallampati: II   Mouth Opening: Normal  TM Distance: Normal  Tongue: Normal  Neck ROM: Normal ROM    Dental:  Periodontal disease    Chest/Lungs:  Clear to auscultation, Normal Respiratory Rate    Heart:  Rate: Normal  Rhythm: Regular Rhythm        Anesthesia Plan  Type of Anesthesia, risks & benefits discussed:    Anesthesia Type: Gen Natural Airway  Intra-op Monitoring Plan: Standard ASA Monitors  Post Op Pain Control Plan: multimodal analgesia and IV/PO Opioids PRN  Induction:  IV  Informed Consent: Informed consent signed with the Patient and all parties understand the risks and agree with anesthesia plan.  All  questions answered.   ASA Score: 4 Emergent  Day of Surgery Review of History & Physical: H&P Update referred to the surgeon/provider.    Ready For Surgery From Anesthesia Perspective.     .

## 2023-10-28 NOTE — ASSESSMENT & PLAN NOTE
Patient is identified as having unspecified heart failure that is Chronic. CHF is currently controlled. Latest ECHO performed and demonstrates- Results for orders placed during the hospital encounter of 08/10/23    Echo    Interpretation Summary    Left Ventricle: There is low normal systolic function with a visually estimated ejection fraction of 50 - 55%. Grade I diastolic dysfunction.    Right Ventricle: Normal right ventricular cavity size. Wall thickness is normal. Right ventricle wall motion  is normal. Systolic function is normal.    Aortic Valve: The aortic valve is a trileaflet valve. There is moderate aortic valve sclerosis. There is moderate to severe stenosis. Aortic valve area by VTI is 0.83 cm². Aortic valve peak velocity is 3.62 m/s. Mean gradient is 36 mmHg. The dimensionless index is 0.27. There is mild aortic regurgitation with a centrally directed jet.    Mitral Valve: There is mild bileaflet sclerosis. There is moderate to severe stenosis. The mean pressure gradient across the mitral valve is 10 mmHg at a heart rate of  bpm. There is mild regurgitation with a centrally directed jet.    Tricuspid Valve: There is normal leaflet mobility. There is mild regurgitation with a centrally directed jet.  . Continue ACE/ARB Furosemide and monitor clinical status closely. Monitor on telemetry. Patient is off CHF pathway.  Monitor strict Is&Os and daily weights.  Place on fluid restriction of 2 L. Continue to stress to patient importance of self efficacy and  on diet for CHF. Last BNP reviewed- and noted below   Recent Labs   Lab 10/25/23  1509   *   .  Gentle intravenous fluids  Monitor for fluid overload symptoms with lower extremity edema and worsening dyspnea

## 2023-10-28 NOTE — BRIEF OP NOTE
Inpatient Endoscopy Brief Operative Note      Admit Date: 10/25/2023    Procedure Date and Time:  10/28/2023 10:44 AM    Attending Physician: Mt Davis MD     Principal Admitting Diagnoses: Sepsis         Discharge Diagnosis: The primary encounter diagnosis was Pneumonia of both lower lobes due to infectious organism. Diagnoses of Weakness, Constipation, Fecal impaction, Right lower lobe lung mass, Mass of lower lobe of left lung, Shortness of breath, Chest pain, Melena, and Sepsis were also pertinent to this visit.     Discharged Condition: Stable    Indication for Admission: Sepsis     Procedure Performed: EGD and Flexible sigmoidoscopy    SEE PROVATIONS REPORTS FOR DETAILS.    Pathology (if any):  Specimen (24h ago, onward)      None            Estimated Blood Loss: 0 ml.    Discussed with: patient and family.    Disposition: Return to ICU.    Recommendations:   Clear liquids, ADAT  H&H q 8 hours x 24 hours  Will consider inpatient video capsule endoscopy if continues to drop counts.      Communicated with ICU service regarding the above findings.       Angela Crockett MD  Inpatient Gastroenterology  [facility]

## 2023-10-29 VITALS
OXYGEN SATURATION: 98 % | DIASTOLIC BLOOD PRESSURE: 59 MMHG | BODY MASS INDEX: 19.48 KG/M2 | TEMPERATURE: 98 F | SYSTOLIC BLOOD PRESSURE: 108 MMHG | HEIGHT: 71 IN | WEIGHT: 139.13 LBS | RESPIRATION RATE: 20 BRPM | HEART RATE: 103 BPM

## 2023-10-29 PROBLEM — A41.9 SEPSIS: Status: RESOLVED | Noted: 2023-10-26 | Resolved: 2023-10-29

## 2023-10-29 PROBLEM — K92.1 MELENA: Status: RESOLVED | Noted: 2023-10-26 | Resolved: 2023-10-29

## 2023-10-29 PROBLEM — K59.00 CONSTIPATION: Status: RESOLVED | Noted: 2023-10-26 | Resolved: 2023-10-29

## 2023-10-29 LAB
ALBUMIN SERPL BCP-MCNC: 2.2 G/DL (ref 3.5–5.2)
ALP SERPL-CCNC: 47 U/L (ref 55–135)
ALT SERPL W/O P-5'-P-CCNC: 186 U/L (ref 10–44)
ANION GAP SERPL CALC-SCNC: 11 MMOL/L (ref 8–16)
ANISOCYTOSIS BLD QL SMEAR: SLIGHT
AST SERPL-CCNC: 86 U/L (ref 10–40)
BASOPHILS NFR BLD: 0 % (ref 0–1.9)
BILIRUB SERPL-MCNC: 0.3 MG/DL (ref 0.1–1)
BUN SERPL-MCNC: 25 MG/DL (ref 8–23)
CALCIUM SERPL-MCNC: 7.1 MG/DL (ref 8.7–10.5)
CHLORIDE SERPL-SCNC: 110 MMOL/L (ref 95–110)
CO2 SERPL-SCNC: 18 MMOL/L (ref 23–29)
CREAT SERPL-MCNC: 1 MG/DL (ref 0.5–1.4)
DIFFERENTIAL METHOD: ABNORMAL
EOSINOPHIL NFR BLD: 0 % (ref 0–8)
ERYTHROCYTE [DISTWIDTH] IN BLOOD BY AUTOMATED COUNT: 16.9 % (ref 11.5–14.5)
EST. GFR  (NO RACE VARIABLE): >60 ML/MIN/1.73 M^2
GLUCOSE SERPL-MCNC: 174 MG/DL (ref 70–110)
HCT VFR BLD AUTO: 27.1 % (ref 40–54)
HGB BLD-MCNC: 8.8 G/DL (ref 14–18)
IMM GRANULOCYTES # BLD AUTO: ABNORMAL K/UL (ref 0–0.04)
IMM GRANULOCYTES NFR BLD AUTO: ABNORMAL % (ref 0–0.5)
LYMPHOCYTES NFR BLD: 1 % (ref 18–48)
MCH RBC QN AUTO: 29.3 PG (ref 27–31)
MCHC RBC AUTO-ENTMCNC: 32.5 G/DL (ref 32–36)
MCV RBC AUTO: 90 FL (ref 82–98)
MONOCYTES NFR BLD: 5 % (ref 4–15)
NEUTROPHILS NFR BLD: 93 % (ref 38–73)
NRBC BLD-RTO: 2 /100 WBC
PLATELET # BLD AUTO: 118 K/UL (ref 150–450)
PLATELET BLD QL SMEAR: ABNORMAL
PMV BLD AUTO: 9.2 FL (ref 9.2–12.9)
POIKILOCYTOSIS BLD QL SMEAR: SLIGHT
POLYCHROMASIA BLD QL SMEAR: ABNORMAL
POTASSIUM SERPL-SCNC: 3.7 MMOL/L (ref 3.5–5.1)
PROT SERPL-MCNC: 4.4 G/DL (ref 6–8.4)
RBC # BLD AUTO: 3 M/UL (ref 4.6–6.2)
SODIUM SERPL-SCNC: 139 MMOL/L (ref 136–145)
WBC # BLD AUTO: 17.83 K/UL (ref 3.9–12.7)
WBC OTHER NFR BLD MANUAL: 1 %

## 2023-10-29 PROCEDURE — 85060 PATHOLOGIST REVIEW: ICD-10-PCS | Mod: ,,, | Performed by: STUDENT IN AN ORGANIZED HEALTH CARE EDUCATION/TRAINING PROGRAM

## 2023-10-29 PROCEDURE — 25000242 PHARM REV CODE 250 ALT 637 W/ HCPCS: Performed by: FAMILY MEDICINE

## 2023-10-29 PROCEDURE — 99900035 HC TECH TIME PER 15 MIN (STAT)

## 2023-10-29 PROCEDURE — 94640 AIRWAY INHALATION TREATMENT: CPT

## 2023-10-29 PROCEDURE — S0179 MEGESTROL 20 MG: HCPCS | Performed by: NURSE PRACTITIONER

## 2023-10-29 PROCEDURE — 80053 COMPREHEN METABOLIC PANEL: CPT | Performed by: INTERNAL MEDICINE

## 2023-10-29 PROCEDURE — 94761 N-INVAS EAR/PLS OXIMETRY MLT: CPT

## 2023-10-29 PROCEDURE — C9113 INJ PANTOPRAZOLE SODIUM, VIA: HCPCS | Performed by: FAMILY MEDICINE

## 2023-10-29 PROCEDURE — 25000242 PHARM REV CODE 250 ALT 637 W/ HCPCS: Performed by: NURSE PRACTITIONER

## 2023-10-29 PROCEDURE — 36415 COLL VENOUS BLD VENIPUNCTURE: CPT | Performed by: FAMILY MEDICINE

## 2023-10-29 PROCEDURE — 25000003 PHARM REV CODE 250: Performed by: NURSE PRACTITIONER

## 2023-10-29 PROCEDURE — 63600175 PHARM REV CODE 636 W HCPCS: Performed by: FAMILY MEDICINE

## 2023-10-29 PROCEDURE — 85060 BLOOD SMEAR INTERPRETATION: CPT | Mod: ,,, | Performed by: STUDENT IN AN ORGANIZED HEALTH CARE EDUCATION/TRAINING PROGRAM

## 2023-10-29 PROCEDURE — 85007 BL SMEAR W/DIFF WBC COUNT: CPT | Performed by: FAMILY MEDICINE

## 2023-10-29 PROCEDURE — 25000003 PHARM REV CODE 250: Performed by: FAMILY MEDICINE

## 2023-10-29 PROCEDURE — 85027 COMPLETE CBC AUTOMATED: CPT | Performed by: FAMILY MEDICINE

## 2023-10-29 PROCEDURE — 27000221 HC OXYGEN, UP TO 24 HOURS

## 2023-10-29 RX ORDER — PREDNISONE 20 MG/1
TABLET ORAL
Qty: 27 TABLET | Refills: 0 | Status: SHIPPED | OUTPATIENT
Start: 2023-10-29 | End: 2023-11-11

## 2023-10-29 RX ORDER — POLYETHYLENE GLYCOL 3350 17 G/17G
17 POWDER, FOR SOLUTION ORAL 3 TIMES DAILY
Status: DISCONTINUED | OUTPATIENT
Start: 2023-10-29 | End: 2023-10-29 | Stop reason: HOSPADM

## 2023-10-29 RX ORDER — PREDNISONE 20 MG/1
40 TABLET ORAL 2 TIMES DAILY
Status: DISCONTINUED | OUTPATIENT
Start: 2023-10-29 | End: 2023-10-29 | Stop reason: HOSPADM

## 2023-10-29 RX ORDER — HYDROCODONE BITARTRATE AND ACETAMINOPHEN 5; 325 MG/1; MG/1
1 TABLET ORAL EVERY 12 HOURS PRN
Qty: 14 TABLET | Refills: 0 | Status: SHIPPED | OUTPATIENT
Start: 2023-10-29

## 2023-10-29 RX ORDER — PANTOPRAZOLE SODIUM 40 MG/1
40 TABLET, DELAYED RELEASE ORAL DAILY
Status: DISCONTINUED | OUTPATIENT
Start: 2023-10-30 | End: 2023-10-29 | Stop reason: HOSPADM

## 2023-10-29 RX ORDER — POLYETHYLENE GLYCOL 3350 17 G/17G
17 POWDER, FOR SOLUTION ORAL 3 TIMES DAILY
Refills: 0
Start: 2023-10-29

## 2023-10-29 RX ADMIN — MEGESTROL ACETATE 400 MG: 40 SUSPENSION ORAL at 09:10

## 2023-10-29 RX ADMIN — METHYLPREDNISOLONE SODIUM SUCCINATE 40 MG: 40 INJECTION, POWDER, FOR SOLUTION INTRAMUSCULAR; INTRAVENOUS at 09:10

## 2023-10-29 RX ADMIN — IRON SUCROSE 100 MG: 20 INJECTION, SOLUTION INTRAVENOUS at 09:10

## 2023-10-29 RX ADMIN — CEFTRIAXONE 2 G: 2 INJECTION, POWDER, FOR SOLUTION INTRAMUSCULAR; INTRAVENOUS at 09:10

## 2023-10-29 RX ADMIN — PANTOPRAZOLE SODIUM 40 MG: 40 INJECTION, POWDER, FOR SOLUTION INTRAVENOUS at 09:10

## 2023-10-29 RX ADMIN — HYDROXYCHLOROQUINE SULFATE 200 MG: 200 TABLET, FILM COATED ORAL at 09:10

## 2023-10-29 RX ADMIN — IPRATROPIUM BROMIDE AND ALBUTEROL SULFATE 3 ML: 2.5; .5 SOLUTION RESPIRATORY (INHALATION) at 07:10

## 2023-10-29 RX ADMIN — SUCRALFATE ORAL 1 G: 1 SUSPENSION ORAL at 05:10

## 2023-10-29 RX ADMIN — IPRATROPIUM BROMIDE AND ALBUTEROL SULFATE 3 ML: 2.5; .5 SOLUTION RESPIRATORY (INHALATION) at 12:10

## 2023-10-29 RX ADMIN — HYDROCODONE BITARTRATE AND ACETAMINOPHEN 1 TABLET: 5; 325 TABLET ORAL at 05:10

## 2023-10-29 RX ADMIN — TAMSULOSIN HYDROCHLORIDE 0.4 MG: 0.4 CAPSULE ORAL at 09:10

## 2023-10-29 RX ADMIN — HYDROCODONE BITARTRATE AND ACETAMINOPHEN 1 TABLET: 5; 325 TABLET ORAL at 01:10

## 2023-10-29 RX ADMIN — MUPIROCIN: 20 OINTMENT TOPICAL at 09:10

## 2023-10-29 NOTE — NURSING
Telemetry monitor removed and returned to monitor tech. PIV removed. Discharge instructions reviewed with patient including discharge medications, follow-up appointments, diet, and activity restrictions. Patient verbalizes understanding and voices no concerns. All personal belongings left with patient. Discharged home via wheelchair to private vehicle in no acute distress

## 2023-10-29 NOTE — PLAN OF CARE
10/29/23 1252   Post-Acute Status   Post-Acute Authorization Hospice   Hospice Status Referrals Sent   Discharge Plan   Discharge Plan A Hospice/home     Referral sent to [facility] via [facility].

## 2023-10-29 NOTE — DISCHARGE INSTRUCTIONS
You have been started on new medication(s) from this hospitalization. Please take as directed and schedule hospital follow up appointments with your primary providers.    [employer]: katia Adult Incontinence Briefs?Incontinence Protector/Bladder Control Bag?Urinary Incontinence Pants Silicone Urine Catheter Bags with Urine Catheter?Night Collection Urine & Leg Bag (M, Men) : Health & Household

## 2023-10-29 NOTE — DISCHARGE SUMMARY
[facility] - Telemetry ([facility])  [facility] Medicine  Discharge Summary      Patient Name: Navdeep Sesay Jr.  MRN: [id number]  MARY JANE: [id number]  Patient Class: IP- Inpatient  Admission Date: 10/25/2023  Hospital Length of Stay: 4 days  Discharge Date and Time:  10/29/2023 12:00 PM  Attending Physician: Michele Vang MD   Discharging Provider: Michele Vang MD  Primary Care Provider: [facility], [facility]    Primary Care Team: [facility] MEDICINE D    HPI:   Navdeep Sesay Jr. is a 78 y.o. male with a PMH  has a past medical history of Aortic stenosis, Chronic anemia, COPD (chronic obstructive pulmonary disease), Hypertension, and Rheumatoid arthritis.    Presented for constipation. Improved after enema but with bright red blood. Receives chronic transfusions outpatient for anemia. Workup unrevealing per step daughter but now requiring more frequent blood transfusions outpatient. Family reports black stools but also takes fe supplemental.     Patient received 2 units while on floor but hb/hct continued to downtrend with active melena. Imaging without GI bleed identified, both nuclear medicine tagged red blood cell and Cta GI bleed. Flexsig with no bleeding source found. Gastroenterology recommending video capsule endoscopy while inpatient. Vital signs stable after receiving 2 additional units of blood.    Hospital medicine consulted for assumption of care.      Procedure(s) (LRB):  SIGMOIDOSCOPY, FLEXIBLE (N/A)  EGD (ESOPHAGOGASTRODUODENOSCOPY) (N/A)      Hospital Course:   10/26 admitted for constipation. Large bowel movement with bright red blood. Hb/hct downtrending. Lactic acidosis. Complains of dyspnea, on 3L chronically. Ill appearing. Low threshold for transferring to icu.   10/27 more alert. Multiple bowel movements. Continues to complain of weakness and dyspnea. Gastroenterology following. Repeat hb/hct pending.    10/29  Downgraded yesterday from icu as vital signs stable status post flexsig per  gastroenterology, as not stable for egd or cscope (tenuous respiratory status and congestive heart failure for sedation and debility to tolerate prep). No overt bleeding found. Hb/hct stable after receiving 4 units. Discussed gastroenterology recommendations with intravenous iron vs fe po supplemental, as latter can cause constipation. Constipation has resolved. Miralax TID recommended. After discussing with gastroenterology, ok to follow up outpatient for video capsule endoscopy if continues to experience GI bleed symptoms. Received empiric antibiotic(s). Patient follows hematology for chronic anemia.    Remains on baseline 2L supplemental oxygen. Endorses improvement but reports continued weakness. After discussing with patient and family, patient opting for trial of comfort focused care as extra layer of support given chronic medical conditions, same as patient's wife who passed away 3 weeks ago. Social consulted for referral.     CTA of lung findings discussed, severe emphysema and necrotic lung mass. Received empiric intravenous antibiotic(s). Will need to be stable before pursuing further workup and/or treatment.     On exam, no acute distress. Respiratory distress. Pale. AO3. Ill appearing. Family at bedside.    Patient seen and evaluated by me. Patient was determined to be suitable for discharge. Patient deemed stable for discharge to home with hospice.    Care update:  Patient unable to start hospice today due to [facility] benefits. Will start tomorrow. Patient requesting norco on discharge and to keep mcghee in place. Hospice aware.     Goals of Care Treatment Preferences:  Code Status: DNR      Consults:   Consults (From admission, onward)          Status Ordering Provider     Inpatient consult to Social Work/Case Management  Once        Provider:  (Not yet assigned)    Ordered ARVIND ABBOTT     Inpatient consult to Gastroenterology  Once        Provider:  Michelle Burch MD    Completed ARVIND ABBOTT     IP  "consult to case management  Once        Provider:  (Not yet assigned)    Completed MORALES DELAROSA            No new Assessment & Plan notes have been filed under this hospital service since the last note was generated.  Service: Hospital Medicine    Final Active Diagnoses:    Diagnosis Date Noted POA    Gastroesophageal reflux disease [K21.9] 10/26/2023 Yes    Chronic anemia [D64.9] 07/02/2021 Yes    Chronic heart failure with preserved ejection fraction [I50.32] 06/30/2021 Yes    Essential hypertension [I10] 07/20/2020 Yes    Other hyperlipidemia [E78.49] 07/20/2020 Yes    Rheumatoid arthritis [M06.9] 07/20/2020 Yes    Type 2 diabetes mellitus [E11.9] 07/20/2020 Yes    COPD (chronic obstructive pulmonary disease) [J44.9] 07/20/2020 Yes      Problems Resolved During this Admission:    Diagnosis Date Noted Date Resolved POA    PRINCIPAL PROBLEM:  Sepsis [A41.9] 10/26/2023 10/29/2023 Yes    Constipation [K59.00] 10/26/2023 10/29/2023 Yes    Melena [K92.1] 10/26/2023 10/29/2023 No       Discharged Condition: stable    Disposition:     Follow Up:   Follow-up Information       [facility] Follow up.    Contact information:  [address] [place] [address] 5, [facility] [address] [phone]                         Patient Instructions:   No discharge procedures on file.    Significant Diagnostic Studies: Labs:   CMP   Recent Labs   Lab 10/28/23  0452 10/29/23  1035    139   K 3.4* 3.7   * 110   CO2 18* 18*   * 174*   BUN 37* 25*   CREATININE 1.2 1.0   CALCIUM 7.4* 7.1*   PROT 4.5* 4.4*   ALBUMIN 2.2* 2.2*   BILITOT 0.4 0.3   ALKPHOS 43* 47*   AST 99* 86*   * 186*   ANIONGAP 11 11   , CBC   Recent Labs   Lab 10/28/23  0452 10/28/23  1443 10/29/23  1127   WBC 18.27*  --  17.83*   HGB 8.6* 8.3* 8.8*   HCT 25.5* 24.8* 27.1*     --  118*   , INR   Lab Results   Component Value Date    INR 1.4 (H) 10/26/2023   , Lipid Panel No results found for: "CHOL", "HDL", " ""LDLCALC", "TRIG", "CHOLHDL" and All labs within the past 24 hours have been reviewed  Microbiology:   Blood Culture   Lab Results   Component Value Date    LABBLOO No Growth to date 10/25/2023    LABBLOO No Growth to date 10/25/2023    LABBLOO No Growth to date 10/25/2023    LABBLOO No Growth to date 10/25/2023     Radiology: X-Ray: CXR: portable and KUB: X-Ray Abdomen flat and erect.  CT scan:  CTA acute gib; CTA chest noncoronary  Nuclear Medicine: nuclear medicine GI bleed     Pending Diagnostic Studies:       None           Medications:  Reconciled Home Medications:      Medication List        START taking these medications      iron sucrose 100 mg iron/5 mL injection  Commonly known as: VENOFER  Inject 5 mLs (100 mg total) into the vein every 7 days.  Start taking on: November 5, 2023     polyethylene glycol 17 gram Pwpk  Commonly known as: GLYCOLAX  Take 17 g by mouth 3 (three) times daily.            CHANGE how you take these medications      * predniSONE 5 MG tablet  Commonly known as: DELTASONE  Take 5 mg by mouth once daily.  What changed: Another medication with the same name was added. Make sure you understand how and when to take each.     * predniSONE 20 MG tablet  Commonly known as: DELTASONE  Take 2 tablets (40 mg total) by mouth 2 (two) times daily for 3 days, THEN 2 tablets (40 mg total) once daily for 5 days, THEN 1 tablet (20 mg total) once daily for 5 days.  Start taking on: October 29, 2023  What changed: You were already taking a medication with the same name, and this prescription was added. Make sure you understand how and when to take each.           * This list has 2 medication(s) that are the same as other medications prescribed for you. Read the directions carefully, and ask your doctor or other care provider to review them with you.                CONTINUE taking these medications      albuterol 1.25 mg/3 mL Nebu  Commonly known as: ACCUNEB  Take 1.25 mg by nebulization every 6 (six) " hours as needed. Rescue     alogliptin 25 mg Tab  Commonly known as: NESINA  Take 1 tablet by mouth once daily.     atorvastatin 80 MG tablet  Commonly known as: LIPITOR  Take 40 mg by mouth every evening.     calcium carbonate 500 mg calcium (1,250 mg) tablet  Commonly known as: OS-DANIA  Take 1,250 mg by mouth once daily.     cetirizine 10 MG tablet  Commonly known as: ZYRTEC  Take 10 mg by mouth once daily.     fluticasone-salmeterol 250-50 mcg/dose 250-50 mcg/dose diskus inhaler  Commonly known as: ADVAIR  Inhale 1 puff into the lungs 2 (two) times daily. Controller     folic acid 1 MG tablet  Commonly known as: FOLVITE  Take 1 mg by mouth once daily.     furosemide 40 MG tablet  Commonly known as: LASIX  Take 1 tablet (40 mg total) by mouth once daily.     guaiFENesin 600 mg 12 hr tablet  Commonly known as: MUCINEX  Take 1,200 mg by mouth every 12 (twelve) hours.     HUMIRA 10 mg/0.2 mL Sykt  Generic drug: adalimumab  Inject 10 mg into the skin every 14 (fourteen) days.     hydroxychloroquine 200 mg tablet  Commonly known as: PLAQUENIL  Take by mouth once daily.     megestroL 400 mg/10 mL (40 mg/mL) Susp  Commonly known as: MEGACE  Take 400 mg by mouth once daily.     methotrexate 2.5 MG Tab  Take 25 mg by mouth every 7 days. (Mondays)     montelukast 10 mg tablet  Commonly known as: SINGULAIR  Take 10 mg by mouth every evening.     omeprazole 20 MG capsule  Commonly known as: PRILOSEC  Take 20 mg by mouth once daily.     tamsulosin 0.4 mg Cap  Commonly known as: FLOMAX  Take 0.4 mg by mouth once daily.     traMADoL 50 mg tablet  Commonly known as: ULTRAM  Take 100 mg by mouth 2 (two) times daily as needed for Pain.            STOP taking these medications      amLODIPine 10 MG tablet  Commonly known as: NORVASC     losartan 100 MG tablet  Commonly known as: COZAAR              Indwelling Lines/Drains at time of discharge:   Lines/Drains/Airways       None                   Time spent on the discharge of patient:  59 minutes         Michele Vang MD  Department of Hospital Medicine  '[facility] - Telemetry ([facility])

## 2023-10-29 NOTE — PLAN OF CARE
[facility] - Telemetry (Hospital)  Discharge Final Note    Primary Care Provider: Inessa Guzman    Expected Discharge Date: 10/29/2023  Contact Info       [facility]    [address], [place] [address]   Phone: [phone]       Next Steps: Follow up              Monica Jessica LMSW 10/29/2023 9:57 AM

## 2023-10-30 LAB
BACTERIA BLD CULT: NORMAL
BACTERIA BLD CULT: NORMAL
PATH REV BLD -IMP: NORMAL